# Patient Record
Sex: FEMALE | Race: WHITE | NOT HISPANIC OR LATINO | Employment: OTHER | URBAN - METROPOLITAN AREA
[De-identification: names, ages, dates, MRNs, and addresses within clinical notes are randomized per-mention and may not be internally consistent; named-entity substitution may affect disease eponyms.]

---

## 2021-09-01 ENCOUNTER — HOSPITAL ENCOUNTER (EMERGENCY)
Facility: HOSPITAL | Age: 51
Discharge: HOME | End: 2021-09-01
Attending: EMERGENCY MEDICINE | Admitting: EMERGENCY MEDICINE
Payer: COMMERCIAL

## 2021-09-01 ENCOUNTER — APPOINTMENT (EMERGENCY)
Dept: RADIOLOGY | Facility: HOSPITAL | Age: 51
End: 2021-09-01
Payer: COMMERCIAL

## 2021-09-01 ENCOUNTER — APPOINTMENT (EMERGENCY)
Dept: CT IMAGING | Facility: HOSPITAL | Age: 51
End: 2021-09-01
Payer: COMMERCIAL

## 2021-09-01 VITALS
HEART RATE: 78 BPM | RESPIRATION RATE: 20 BRPM | SYSTOLIC BLOOD PRESSURE: 140 MMHG | HEIGHT: 69 IN | OXYGEN SATURATION: 100 % | DIASTOLIC BLOOD PRESSURE: 70 MMHG | TEMPERATURE: 98.2 F | WEIGHT: 160 LBS | BODY MASS INDEX: 23.7 KG/M2

## 2021-09-01 DIAGNOSIS — S00.83XA FACIAL CONTUSION: ICD-10-CM

## 2021-09-01 DIAGNOSIS — S80.212A ABRASION OF LEFT KNEE: ICD-10-CM

## 2021-09-01 DIAGNOSIS — S63.92XA HAND SPRAIN, LEFT, INITIAL ENCOUNTER: Primary | ICD-10-CM

## 2021-09-01 PROCEDURE — G1004 CDSM NDSC: HCPCS

## 2021-09-01 PROCEDURE — 72125 CT NECK SPINE W/O DYE: CPT

## 2021-09-01 PROCEDURE — 73564 X-RAY EXAM KNEE 4 OR MORE: CPT

## 2021-09-01 PROCEDURE — 70486 CT MAXILLOFACIAL W/O DYE: CPT

## 2021-09-01 PROCEDURE — 99284 EMERGENCY DEPT VISIT MOD MDM: CPT

## 2021-09-01 PROCEDURE — 73030 X-RAY EXAM OF SHOULDER: CPT

## 2021-09-01 PROCEDURE — 73130 X-RAY EXAM OF HAND: CPT

## 2021-09-01 PROCEDURE — 99284 EMERGENCY DEPT VISIT MOD MDM: CPT | Performed by: EMERGENCY MEDICINE

## 2021-09-01 PROCEDURE — 70450 CT HEAD/BRAIN W/O DYE: CPT

## 2021-09-01 RX ORDER — KETOROLAC TROMETHAMINE 30 MG/ML
30 INJECTION, SOLUTION INTRAMUSCULAR; INTRAVENOUS ONCE
Status: COMPLETED | OUTPATIENT
Start: 2021-09-01 | End: 2021-09-01

## 2021-09-01 RX ORDER — TRAZODONE HYDROCHLORIDE 150 MG/1
150 TABLET ORAL
COMMUNITY

## 2021-09-01 RX ORDER — BUPRENORPHINE HYDROCHLORIDE 600 UG/1
FILM, SOLUBLE BUCCAL 2 TIMES DAILY
COMMUNITY

## 2021-09-01 RX ORDER — MORPHINE SULFATE 20 MG/5ML
20 SOLUTION ORAL 3 TIMES DAILY
COMMUNITY

## 2021-09-01 RX ORDER — BUSPIRONE HYDROCHLORIDE 5 MG/1
5 TABLET ORAL 3 TIMES DAILY
COMMUNITY

## 2021-09-01 RX ORDER — TIZANIDINE HYDROCHLORIDE 6 MG/1
10 CAPSULE, GELATIN COATED ORAL 3 TIMES DAILY
COMMUNITY

## 2021-09-01 RX ORDER — ARIPIPRAZOLE 10 MG/1
10 TABLET ORAL DAILY
COMMUNITY

## 2021-09-01 RX ORDER — CETIRIZINE HYDROCHLORIDE 10 MG/1
10 TABLET ORAL DAILY
COMMUNITY

## 2021-09-01 RX ORDER — DULOXETIN HYDROCHLORIDE 60 MG/1
60 CAPSULE, DELAYED RELEASE ORAL DAILY
COMMUNITY

## 2021-09-01 RX ADMIN — KETOROLAC TROMETHAMINE 30 MG: 30 INJECTION, SOLUTION INTRAMUSCULAR; INTRAVENOUS at 17:21

## 2021-09-01 NOTE — ED PROVIDER NOTES
History  Chief Complaint   Patient presents with    Wrist Injury     [place] and [place]pped and fell, denies LOC, injury to left wrist, left knee, left face  denies thinners  This is a 19-year-old female states that she tripped on a concrete edge at the [address] [place] [address] yesterday injuring her left knee left hand left shoulder and face no blood thinners no loss of consciousness and tetanus is up-to-date      History provided by:  Patient  Medical Problem  Location:   left hand shoulder knee and facial injuries after trip and fall  Quality:   achy pain  Severity:  Moderate  Onset quality:  Sudden  Duration:  1 day  Timing:  Constant  Progression:  Unchanged  Chronicity:  New  Context:   multiple injuries after trip and fall  Relieved by:  Nothing  Worsened by: Movement      Prior to Admission Medications   Prescriptions Last Dose Informant Patient Reported? Taking?    ARIPiprazole (ABILIFY) 10 mg tablet 8/31/2021 at Unknown time  Yes Yes   Sig: Take 10 mg by mouth daily   Buprenorphine HCl (Belbuca) 600 MCG FILM 8/31/2021 at Unknown time  Yes Yes   Sig: Apply to cheek 2 (two) times a day   DULoxetine (CYMBALTA) 60 mg delayed release capsule 9/1/2021 at Unknown time  Yes Yes   Sig: Take 60 mg by mouth daily   TiZANidine (ZANAFLEX) 6 MG capsule 8/31/2021 at Unknown time  Yes Yes   Sig: Take 10 mg by mouth 3 (three) times a day   busPIRone (BUSPAR) 5 mg tablet 8/31/2021 at Unknown time  Yes Yes   Sig: Take 5 mg by mouth 3 (three) times a day   cetirizine (ZyrTEC) 10 mg tablet 8/31/2021 at Unknown time  Yes Yes   Sig: Take 10 mg by mouth daily   morphine 20 MG/5ML solution 8/31/2021 at Unknown time  Yes Yes   Sig: Take 20 mg by mouth 3 (three) times a day   traZODone (DESYREL) 150 mg tablet 8/31/2021 at Unknown time  Yes Yes   Sig: Take 150 mg by mouth daily at bedtime      Facility-Administered Medications: None       Past Medical History:   Diagnosis Date    RSD (reflex sympathetic dystrophy)  TIA (transient ischemic attack)        Past Surgical History:   Procedure Laterality Date    APPENDECTOMY      GASTRIC BYPASS      TONSILLECTOMY         History reviewed  No pertinent family history  I have reviewed and agree with the history as documented  E-Cigarette/Vaping    E-Cigarette Use Never User      E-Cigarette/Vaping Substances    Nicotine No     THC No     CBD No     Flavoring No     Other No     Unknown No      Social History     Tobacco Use    Smoking status: Never Smoker   Vaping Use    Vaping Use: Never used   Substance Use Topics    Alcohol use: Never    Drug use: Yes     Types: Marijuana       Review of Systems   Musculoskeletal:        Facial injuries left shoulder left hand and left knee injury   Skin: Positive for wound ( left knee abrasion)  All other systems reviewed and are negative  Physical Exam  Physical Exam  Vitals and nursing note reviewed  Constitutional:       General: She is not in acute distress  Appearance: She is not ill-appearing, toxic-appearing or diaphoretic  HENT:      Head: Normocephalic  Right Ear: Tympanic membrane, ear canal and external ear normal       Left Ear: Tympanic membrane, ear canal and external ear normal       Nose: Nose normal    Eyes:      General: No scleral icterus  Right eye: No discharge  Left eye: No discharge  Extraocular Movements: Extraocular movements intact  Pupils: Pupils are equal, round, and reactive to light  Comments: Left periorbital  Ecchymosis and left cheek contusion   Cardiovascular:      Rate and Rhythm: Normal rate and regular rhythm  Pulses: Normal pulses  Heart sounds: Normal heart sounds  No murmur heard  No friction rub  No gallop  Pulmonary:      Effort: Pulmonary effort is normal  No respiratory distress  Breath sounds: Normal breath sounds  No stridor  No wheezing, rhonchi or rales  Abdominal:      General: There is no distension  Palpations: Abdomen is soft  Tenderness: There is no abdominal tenderness  There is no guarding or rebound  Musculoskeletal:         General: Tenderness and signs of injury present  No deformity  Cervical back: Normal range of motion and neck supple  Tenderness ( chronic) present  Right lower leg: No edema  Left lower leg: No edema  Comments: Tenderness to the left shoulder left hand and left knee   Skin:     General: Skin is warm and dry  Coloration: Skin is not jaundiced  Findings: Bruising present  Comments: Left knee abrasion   Neurological:      General: No focal deficit present  Mental Status: She is alert and oriented to person, place, and time  Sensory: No sensory deficit  Coordination: Coordination normal    Psychiatric:         Mood and Affect: Mood normal          Behavior: Behavior normal          Thought Content: Thought content normal          Vital Signs  ED Triage Vitals [09/01/21 1545]   Temperature Pulse Respirations Blood Pressure SpO2   98 2 °F (36 8 °C) 78 20 140/70 100 %      Temp Source Heart Rate Source Patient Position - Orthostatic VS BP Location FiO2 (%)   Temporal Monitor Sitting Right arm --      Pain Score       6           Vitals:    09/01/21 1545   BP: 140/70   Pulse: 78   Patient Position - Orthostatic VS: Sitting         Visual Acuity      ED Medications  Medications   ketorolac (TORADOL) injection 30 mg (30 mg Intramuscular Given 9/1/21 1721)       Diagnostic Studies  Results Reviewed     None                 CT head without contrast   Final Result by Tiffanie Yuen MD (09/01 1652)      No acute intracranial abnormality  Workstation performed: [id number]         CT facial bones without contrast   Final Result by Tiffanie Yuen MD (09/01 1656)      Mild soft tissue swelling in the left facial region just below the zygomatic arch  No fracture identified                 Workstation performed: [id number] CT cervical spine without contrast   Final Result by Uri Russell MD (09/01 1657)      No cervical spine fracture or traumatic malalignment  Workstation performed: [id number]         XR shoulder 2+ views LEFT   Final Result by Marissa Arizmendi DO (09/01 1751)      No acute osseous abnormality  Workstation performed: [id number]         XR hand 3+ views LEFT   Final Result by Marissa Arizmendi DO (09/01 1749)      No acute osseous abnormality  Workstation performed: [id number]         XR knee 4+ views left injury   Final Result by Marissa Arizmendi DO (09/01 1746)      No acute osseous abnormality  Workstation performed: [id number]                    Procedures  Procedures         ED Course                             SBIRT 20yo+      Most Recent Value   SBIRT (22 yo +)   In order to provide better care to our patients, we are screening all of our patients for alcohol and drug use  Would it be okay to ask you these screening questions? Yes Filed at: 09/01/2021 1549   Initial Alcohol Screen: US AUDIT-C    1  How often do you have a drink containing alcohol?  0 Filed at: 09/01/2021 1549   2  How many drinks containing alcohol do you have on a typical day you are drinking? 0 Filed at: 09/01/2021 1549   3a  Male UNDER 65: How often do you have five or more drinks on one occasion? 0 Filed at: 09/01/2021 1549   3b  FEMALE Any Age, or MALE 65+: How often do you have 4 or more drinks on one occassion? 0 Filed at: 09/01/2021 1549   Audit-C Score  0 Filed at: 09/01/2021 1549   NEERAJ: How many times in the past year have you    Used an illegal drug or used a prescription medication for non-medical reasons?   Never Filed at: 09/01/2021 1549                    MDM    Disposition  Final diagnoses:   Hand sprain, left, initial encounter   Abrasion of left knee   Facial contusion     Time reflects when diagnosis was documented in both MDM as applicable and the Disposition within this note     Time User Action Codes Description Comment    9/1/2021  5:15 PM Kristin Whitt Add [J76 45FW] Hand sprain, left, initial encounter     9/1/2021  5:15 PM Kristin Whitt Add [K35 127P] Abrasion of left knee     9/1/2021  5:55 PM Kristin Whitt Add [S00 83XA] Facial contusion       ED Disposition     ED Disposition Condition Date/Time Comment    Discharge  Wed Sep 1, 2021  5:24 PM Sanjana Mares discharge to home/self care  Follow-up Information     Follow up With Specialties Details Why Contact Info    Billie Magaña, DO    [address] [address] [phone]            Discharge Medication List as of 9/1/2021  5:16 PM      CONTINUE these medications which have NOT CHANGED    Details   ARIPiprazole (ABILIFY) 10 mg tablet Take 10 mg by mouth daily, Historical Med      Buprenorphine HCl (Belbuca) 600 MCG FILM Apply to cheek 2 (two) times a day, Historical Med      busPIRone (BUSPAR) 5 mg tablet Take 5 mg by mouth 3 (three) times a day, Historical Med      cetirizine (ZyrTEC) 10 mg tablet Take 10 mg by mouth daily, Historical Med      DULoxetine (CYMBALTA) 60 mg delayed release capsule Take 60 mg by mouth daily, Historical Med      morphine 20 MG/5ML solution Take 20 mg by mouth 3 (three) times a day, Historical Med      TiZANidine (ZANAFLEX) 6 MG capsule Take 10 mg by mouth 3 (three) times a day, Historical Med      traZODone (DESYREL) 150 mg tablet Take 150 mg by mouth daily at bedtime, Historical Med           No discharge procedures on file      PDMP Review     None          ED Provider  Electronically Signed by           Romi Lu,   09/01/21 DO Renee  09/01/21 6035

## 2022-09-27 ENCOUNTER — OFFICE VISIT (OUTPATIENT)
Dept: FAMILY MEDICINE CLINIC | Facility: CLINIC | Age: 52
End: 2022-09-27
Payer: COMMERCIAL

## 2022-09-27 VITALS
BODY MASS INDEX: 27.31 KG/M2 | WEIGHT: 180.2 LBS | HEART RATE: 104 BPM | OXYGEN SATURATION: 98 % | TEMPERATURE: 98.2 F | SYSTOLIC BLOOD PRESSURE: 124 MMHG | DIASTOLIC BLOOD PRESSURE: 78 MMHG | HEIGHT: 68 IN

## 2022-09-27 DIAGNOSIS — Z98.84 HISTORY OF GASTRIC BYPASS: ICD-10-CM

## 2022-09-27 DIAGNOSIS — G90.50 RSD (REFLEX SYMPATHETIC DYSTROPHY): ICD-10-CM

## 2022-09-27 DIAGNOSIS — Z23 ENCOUNTER FOR IMMUNIZATION: ICD-10-CM

## 2022-09-27 DIAGNOSIS — Z12.31 ENCOUNTER FOR SCREENING MAMMOGRAM FOR BREAST CANCER: ICD-10-CM

## 2022-09-27 DIAGNOSIS — R73.01 IMPAIRED FASTING GLUCOSE: ICD-10-CM

## 2022-09-27 DIAGNOSIS — F32.9 MAJOR DEPRESSIVE DISORDER WITH CURRENT ACTIVE EPISODE, UNSPECIFIED DEPRESSION EPISODE SEVERITY, UNSPECIFIED WHETHER RECURRENT: ICD-10-CM

## 2022-09-27 DIAGNOSIS — F11.20 CONTINUOUS OPIOID DEPENDENCE (HCC): ICD-10-CM

## 2022-09-27 DIAGNOSIS — Z12.11 SCREEN FOR COLON CANCER: ICD-10-CM

## 2022-09-27 DIAGNOSIS — Z86.39 HISTORY OF IRON DEFICIENCY: ICD-10-CM

## 2022-09-27 DIAGNOSIS — Z13.6 SCREENING FOR CARDIOVASCULAR CONDITION: ICD-10-CM

## 2022-09-27 DIAGNOSIS — Z00.00 MEDICARE ANNUAL WELLNESS VISIT, INITIAL: Primary | ICD-10-CM

## 2022-09-27 DIAGNOSIS — Z12.2 ENCOUNTER FOR SCREENING FOR LUNG CANCER: ICD-10-CM

## 2022-09-27 DIAGNOSIS — E61.1 IRON DEFICIENCY: ICD-10-CM

## 2022-09-27 DIAGNOSIS — F17.211 NICOTINE DEPENDENCE, CIGARETTES, IN REMISSION: ICD-10-CM

## 2022-09-27 DIAGNOSIS — Z13.29 SCREENING FOR THYROID DISORDER: ICD-10-CM

## 2022-09-27 DIAGNOSIS — E78.2 MIXED HYPERLIPIDEMIA: ICD-10-CM

## 2022-09-27 DIAGNOSIS — Z87.891 HISTORY OF TOBACCO USE: ICD-10-CM

## 2022-09-27 DIAGNOSIS — D56.3 THALASSEMIA MINOR: ICD-10-CM

## 2022-09-27 PROBLEM — F41.9 ANXIETY: Status: ACTIVE | Noted: 2022-09-27

## 2022-09-27 PROBLEM — G89.4 CHRONIC PAIN SYNDROME: Status: ACTIVE | Noted: 2022-09-27

## 2022-09-27 PROBLEM — Z82.49 FAMILY HISTORY OF PREMATURE CAD: Status: ACTIVE | Noted: 2022-09-27

## 2022-09-27 PROCEDURE — 90682 RIV4 VACC RECOMBINANT DNA IM: CPT

## 2022-09-27 PROCEDURE — 99214 OFFICE O/P EST MOD 30 MIN: CPT | Performed by: FAMILY MEDICINE

## 2022-09-27 PROCEDURE — G0439 PPPS, SUBSEQ VISIT: HCPCS | Performed by: FAMILY MEDICINE

## 2022-09-27 PROCEDURE — G0008 ADMIN INFLUENZA VIRUS VAC: HCPCS

## 2022-09-27 PROCEDURE — 3725F SCREEN DEPRESSION PERFORMED: CPT | Performed by: FAMILY MEDICINE

## 2022-09-27 RX ORDER — TIZANIDINE HYDROCHLORIDE 4 MG/1
1 CAPSULE, GELATIN COATED ORAL 3 TIMES DAILY PRN
COMMUNITY
Start: 2022-09-20 | End: 2022-09-27

## 2022-09-27 RX ORDER — HYDROXYZINE HYDROCHLORIDE 10 MG/1
10 TABLET, FILM COATED ORAL 3 TIMES DAILY PRN
COMMUNITY
Start: 2022-09-20 | End: 2022-10-20

## 2022-09-27 RX ORDER — TRAZODONE HYDROCHLORIDE 100 MG/1
1 TABLET ORAL
COMMUNITY
Start: 2022-06-30 | End: 2022-10-20 | Stop reason: SDUPTHER

## 2022-09-27 RX ORDER — ONDANSETRON HYDROCHLORIDE 8 MG/1
8 TABLET, FILM COATED ORAL EVERY 8 HOURS PRN
COMMUNITY

## 2022-09-27 RX ORDER — ZOSTER VACCINE RECOMBINANT, ADJUVANTED 50 MCG/0.5
0.5 KIT INTRAMUSCULAR ONCE
Qty: 1 EACH | Refills: 1 | Status: SHIPPED | OUTPATIENT
Start: 2022-09-27 | End: 2022-09-27

## 2022-09-27 RX ORDER — BUPRENORPHINE 20 UG/H
1 PATCH TRANSDERMAL WEEKLY
COMMUNITY
Start: 2022-09-01

## 2022-09-27 NOTE — PROGRESS NOTES
Name: Regino Malave      : 1970      MRN: [id number]  Encounter Provider: Dayton Martel DO  Encounter Date: 2022   Encounter department: [address] [place] [address]     1  Medicare annual wellness visit, initial  See separate note  2  Encounter for immunization  -     influenza vaccine, quadrivalent, recombinant, PF, 0 5 mL, for patients 18 yr+ (FLUBLOK)  -     Zoster Vac Recomb Adjuvanted (Shingrix) 50 MCG/0 5ML SUSR; Inject 0 5 mL into a muscle once for 1 dose Repeat dose in 2 to 6 months  Flu vaccine was administered in office today  rx for shingrix sent to local pharmacy    3  Encounter for screening mammogram for breast cancer  -     Mammo screening bilateral w 3d & cad; Future; Expected date: 2022    4  Encounter for screening for lung cancer  -     CT lung screening program; Future; Expected date: 2022    5  Nicotine dependence, cigarettes, in remission  -     CT lung screening program; Future; Expected date: 2022  Baseline CT lung cancer screen ordered after discussion with patient  6  Screening for cardiovascular condition  -     Lipid panel; Future  -     Lipid panel    7  History of iron deficiency  -     CBC and differential; Future  -     CBC and differential  -     Iron; Future  -     TIBC; Future  -     Ferritin; Future  -     Iron  -     TIBC  -     Ferritin  See below  8  Screening for thyroid disorder  -     TSH, 3rd generation with Free T4 reflex; Future  -     TSH, 3rd generation with Free T4 reflex    9  Mixed hyperlipidemia  -     Comprehensive metabolic panel; Future  -     Comprehensive metabolic panel  Last lipid panel done in   Her total cholesterol was elevated at 251  Her LDL was also elevated at 160  HDL was 69    10  Screen for colon cancer  -     Ambulatory referral for colonoscopy; Future  Has never had colonoscopy  Referral placed today  11   History of gastric bypass  -     CBC and differential; Future  - Vitamin B12; Future  -     Vitamin D 25 hydroxy; Future  -     CBC and differential  -     Vitamin B12  -     Vitamin D 25 hydroxy  Screening labs ordered   12  Thalassemia minor  -     CBC and differential; Future  -     CBC and differential    13  RSD (reflex sympathetic dystrophy)  Diagnosed in "09 and on disability for this  Followed by neurology  Will obtain her records  14  History of tobacco use  -     US abdominal aorta; Future; Expected date: 09/27/2022  Quit in '09  Has never had US for screening for AAA   Ordered today  15  Major depressive disorder with current active episode, unspecified depression episode severity, unspecified whether recurrent  Follows with Dr Krista Dorantes who used to work at [employer]  Now in [address] [employer] so she does video visits with him  Stable on current meds--buspar, abilify, cymbalta and trazodone  16  Continuous opioid dependence ([facility] [address] )  On butrans and morphine for her chronic pain from her RSD  Neurology manages this  17  Iron deficiency  History of iron deficiency  Likely secondary to her gastric bypass surgery  Will check iron levels along with TIBC and ferritin  She previously saw hematology for iron infusions  Last infusion in 2020  May need referral to new hematologist pending results  Subjective     HPI     Patient is a 47year old female who presents today as a new patient to this office  Previous PCP was [facility] in [place]  Has been patient there since she was 5years old  She has not been there in 3-4 years  She follows with neurology for her RSD and migraines  Was initially diagnosed with RSD at [address] [place] [address] in June of 2010 by Dr Autumn Rodriguez  Currently seeing a neurologist, DR todd Bromberg in [facility]  She gets rhizotomy treatments a couple times per year  Most of her pain is from "rib cage up to her neck"  She follows with psych--[facility]  Diagnosis is major depressive disorder and anxiety   She sees Dr Krista Dorantes who moved to [place]  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  Tends to get fluid laden when she exercises  Tends to be from knees down  Gets pitting edema  Previous PCP thought maybe secondary to low protein levels  Previously saw [facility] for iron infusions in past  Last infusion was 2020    Review of Systems    Past Medical History:   Diagnosis Date    Allergic     Anemia     Anxiety     Depression     Eating disorder     prior to her gastric bypass was an over-eater    Iron deficiency     Migraine     Obesity     RSD (reflex sympathetic dystrophy)     Thalassemia minor     TIA (transient ischemic attack)     reaction to baclofen? ?     Past Surgical History:   Procedure Laterality Date    [address]    GASTRIC BYPASS N/A 2005    TONSILLECTOMY       Family History   Problem Relation Age of Onset    Mental illness Mother     Depression Mother     Diabetes Mother         Actually this is moms mom    ADD / ADHD Mother     Anxiety disorder Mother     Heart disease Father     Cancer Father     Alcohol abuse Brother     Completed Suicide  Brother     Substance Abuse Brother     Drug abuse Brother     Psychiatric Illness Brother     Schizophrenia Brother     Substance Abuse Son     Asthma Daughter     Autoimmune disease Daughter      Social History     Socioeconomic History    Marital status: Single     Spouse name: None    Number of children: None    Years of education: None    Highest education level: None   Occupational History    None   Tobacco Use    Smoking status: Former Smoker     Packs/day: 1 00     Years: 25 00     Pack years: 25 00     Types: Cigarettes     Start date: 1982     Quit date: 2009     Years since quittin 4    Smokeless tobacco: Never Used   Vaping Use    Vaping Use: Never used   Substance and Sexual Activity    Alcohol use: Not Currently    Drug use: Yes     Types: Marijuana     Comment: medical marijuana    Sexual activity: Not Currently     Partners: Male     Birth control/protection: Condom Male   Other Topics Concern    None   Social History Narrative        2 children who are recovering addicts  Daughter lives in [place] and son lives with her    Is a "Universal "     Social Determinants of Health     Financial Resource Strain: Low Risk     Difficulty of Paying Living Expenses: Not hard at all   Food Insecurity: Not on file   Transportation Needs: Unmet Transportation Needs    Lack of Transportation (Medical):  Yes    Lack of Transportation (Non-Medical): Yes   Physical Activity: Not on file   Stress: Not on file   Social Connections: Not on file   Intimate Partner Violence: Not on file   Housing Stability: Not on file     Current Outpatient Medications on File Prior to Visit   Medication Sig    ARIPiprazole (ABILIFY) 10 mg tablet Take 10 mg by mouth daily    busPIRone (BUSPAR) 5 mg tablet Take 5 mg by mouth 3 (three) times a day    cetirizine (ZyrTEC) 10 mg tablet Take 10 mg by mouth daily    DULoxetine (CYMBALTA) 60 mg delayed release capsule Take 120 mg by mouth daily at bedtime    hydrOXYzine HCL (ATARAX) 10 mg tablet Take 10 mg by mouth 3 (three) times a day as needed    morphine 20 MG/5ML solution Take 20 mg by mouth 3 (three) times a day    ondansetron (ZOFRAN) 8 mg tablet Take 8 mg by mouth every 8 (eight) hours as needed for nausea or vomiting    transdermal buprenorphine (BUTRANS) 20 mcg/hr PTWK TD patch Place 1 patch on the skin once a week    traZODone (DESYREL) 100 mg tablet Take 1 tablet by mouth daily at bedtime as needed    [DISCONTINUED] Buprenorphine HCl (Belbuca) 600 MCG FILM Apply to cheek 2 (two) times a day    [DISCONTINUED] Cetirizine HCl 10 MG CAPS Take 10 mg by mouth 2 (two) times a day (Patient not taking: Reported on 9/27/2022)    [DISCONTINUED] TiZANidine (ZANAFLEX) 4 MG capsule Take 1 capsule by mouth 3 (three) times a day as needed (Patient not taking: Reported on 9/27/2022)    [DISCONTINUED] TiZANidine (ZANAFLEX) 6 MG capsule Take 10 mg by mouth 3 (three) times a day (Patient not taking: Reported on 9/27/2022)    [DISCONTINUED] traZODone (DESYREL) 150 mg tablet Take 150 mg by mouth daily at bedtime (Patient not taking: Reported on 9/27/2022)     Allergies   Allergen Reactions    Baclofen Confusion     TIA    Imitrex [Sumatriptan] Chest Pain     Immunization History   Administered Date(s) Administered    Influenza, recombinant, quadrivalent,injectable, preservative free 09/27/2022       Objective     /78 (BP Location: Left arm, Patient Position: Standing, Cuff Size: Standard)   Pulse 104   Temp 98 2 °F (36 8 °C) (Tympanic)   Ht 5' 8" (1 727 m)   Wt 81 7 kg (180 lb 3 2 oz)   SpO2 98%   BMI 27 40 kg/m²     Physical Exam  Vitals and nursing note reviewed  Constitutional:       General: She is not in acute distress  Appearance: Normal appearance  She is not ill-appearing, toxic-appearing or diaphoretic  HENT:      Head: Normocephalic and atraumatic  Right Ear: Tympanic membrane normal       Left Ear: Tympanic membrane normal       Nose: Nose normal       Mouth/Throat:      Mouth: Mucous membranes are moist       Pharynx: No oropharyngeal exudate or posterior oropharyngeal erythema  Eyes:      Extraocular Movements: Extraocular movements intact  Conjunctiva/sclera: Conjunctivae normal       Pupils: Pupils are equal, round, and reactive to light  Cardiovascular:      Rate and Rhythm: Normal rate and regular rhythm  Pulses: Normal pulses  Heart sounds: No murmur heard  Pulmonary:      Effort: Pulmonary effort is normal  No respiratory distress  Breath sounds: Normal breath sounds  No stridor  No wheezing or rhonchi  Abdominal:      General: Abdomen is flat  Bowel sounds are normal  There is no distension        Palpations: Abdomen is soft  There is no mass  Tenderness: There is no abdominal tenderness  Musculoskeletal:         General: Normal range of motion  Cervical back: Normal range of motion and neck supple  Right lower leg: No edema  Left lower leg: No edema  Lymphadenopathy:      Cervical: No cervical adenopathy  Skin:     General: Skin is warm and dry  Findings: No rash  Neurological:      General: No focal deficit present  Mental Status: She is alert and oriented to person, place, and time  Motor: No weakness        Gait: Gait normal       Deep Tendon Reflexes: Reflexes normal    Psychiatric:         Mood and Affect: Mood normal          Behavior: Behavior normal        Dayton Martel DO

## 2022-09-27 NOTE — PATIENT INSTRUCTIONS
Medicare Preventive Visit Patient Instructions  Thank you for completing your Welcome to Medicare Visit or Medicare Annual Wellness Visit today  Your next wellness visit will be due in one year (9/28/2023)  The screening/preventive services that you may require over the next 5-10 years are detailed below  Some tests may not apply to you based off risk factors and/or age  Screening tests ordered at today's visit but not completed yet may show as past due  Also, please note that scanned in results may not display below  Preventive Screenings:  Service Recommendations Previous Testing/Comments   Colorectal Cancer Screening  * Colonoscopy    * Fecal Occult Blood Test (FOBT)/Fecal Immunochemical Test (FIT)  * Fecal DNA/Cologuard Test  * Flexible Sigmoidoscopy Age: 39-70 years old   Colonoscopy: every 10 years (may be performed more frequently if at higher risk)  OR  FOBT/FIT: every 1 year  OR  Cologuard: every 3 years  OR  Sigmoidoscopy: every 5 years  Screening may be recommended earlier than age 39 if at higher risk for colorectal cancer  Also, an individualized decision between you and your healthcare provider will decide whether screening between the ages of 74-80 would be appropriate  Colonoscopy: Not on file  FOBT/FIT: Not on file  Cologuard: Not on file  Sigmoidoscopy: Not on file          Breast Cancer Screening Age: 36 years old  Frequency: every 1-2 years  Not required if history of left and right mastectomy Mammogram: Not on file        Cervical Cancer Screening Between the ages of 21-29, pap smear recommended once every 3 years  Between the ages of 33-67, can perform pap smear with HPV co-testing every 5 years     Recommendations may differ for women with a history of total hysterectomy, cervical cancer, or abnormal pap smears in past  Pap Smear: Not on file        Hepatitis C Screening Once for adults born between [facility]  More frequently in patients at high risk for Hepatitis C Hep C Antibody: Not on file        Diabetes Screening 1-2 times per year if you're at risk for diabetes or have pre-diabetes Fasting glucose: No results in last 5 years (No results in last 5 years)  A1C: No results in last 5 years (No results in last 5 years)      Cholesterol Screening Once every 5 years if you don't have a lipid disorder  May order more often based on risk factors  Lipid panel: Not on file          Other Preventive Screenings Covered by Medicare:  1  Abdominal Aortic Aneurysm (AAA) Screening: covered once if your at risk  You're considered to be at risk if you have a family history of AAA  2  Lung Cancer Screening: covers low dose CT scan once per year if you meet all of the following conditions: (1) Age 50-69; (2) No signs or symptoms of lung cancer; (3) Current smoker or have quit smoking within the last 15 years; (4) You have a tobacco smoking history of at least 20 pack years (packs per day multiplied by number of years you smoked); (5) You get a written order from a healthcare provider  3  Glaucoma Screening: covered annually if you're considered high risk: (1) You have diabetes OR (2) Family history of glaucoma OR (3)  aged 48 and older OR (3)  American aged 72 and older  3  Osteoporosis Screening: covered every 2 years if you meet one of the following conditions: (1) You're estrogen deficient and at risk for osteoporosis based off medical history and other findings; (2) Have a vertebral abnormality; (3) On glucocorticoid therapy for more than 3 months; (4) Have primary hyperparathyroidism; (5) On osteoporosis medications and need to assess response to drug therapy  · Last bone density test (DXA Scan): Not on file  5  HIV Screening: covered annually if you're between the age of 12-76  Also covered annually if you are younger than 13 and older than 72 with risk factors for HIV infection   For pregnant patients, it is covered up to 3 times per pregnancy  Immunizations:  Immunization Recommendations   Influenza Vaccine Annual influenza vaccination during flu season is recommended for all persons aged >= 6 months who do not have contraindications   Pneumococcal Vaccine   * Pneumococcal conjugate vaccine = PCV13 (Prevnar 13), PCV15 (Vaxneuvance), PCV20 (Prevnar 20)  * Pneumococcal polysaccharide vaccine = PPSV23 (Pneumovax) Adults 25-60 years old: 1-3 doses may be recommended based on certain risk factors  Adults 72 years old: 1-2 doses may be recommended based off what pneumonia vaccine you previously received   Hepatitis B Vaccine 3 dose series if at intermediate or high risk (ex: diabetes, end stage renal disease, liver disease)   Tetanus (Td) Vaccine - COST NOT COVERED BY MEDICARE PART B Following completion of primary series, a booster dose should be given every 10 years to maintain immunity against tetanus  Td may also be given as tetanus wound prophylaxis  Tdap Vaccine - COST NOT COVERED BY MEDICARE PART B Recommended at least once for all adults  For pregnant patients, recommended with each pregnancy  Shingles Vaccine (Shingrix) - COST NOT COVERED BY MEDICARE PART B  2 shot series recommended in those aged 48 and above     Health Maintenance Due:      Topic Date Due    Hepatitis C Screening  Never done    HIV Screening  Never done    Cervical Cancer Screening  Never done    Breast Cancer Screening: Mammogram  Never done    Colorectal Cancer Screening  Never done    Lung Cancer Screening  Never done     Immunizations Due:      Topic Date Due    Pneumococcal Vaccine: Pediatrics (0 to 5 Years) and At-Risk Patients (6 to 59 Years) (1 - PCV) Never done    COVID-19 Vaccine (3 - Booster for Pfizer series) 09/27/2021    Influenza Vaccine (1) 09/01/2022     Advance Directives   What are advance directives? Advance directives are legal documents that state your wishes and plans for medical care   These plans are made ahead of time in case you lose your ability to make decisions for yourself  Advance directives can apply to any medical decision, such as the treatments you want, and if you want to donate organs  What are the types of advance directives? There are many types of advance directives, and each state has rules about how to use them  You may choose a combination of any of the following:  · Living will: This is a written record of the treatment you want  You can also choose which treatments you do not want, which to limit, and which to stop at a certain time  This includes surgery, medicine, IV fluid, and tube feedings  · Durable power of  for healthcare [facility]): This is a written record that states who you want to make healthcare choices for you when you are unable to make them for yourself  This person, called a proxy, is usually a family member or a friend  You may choose more than 1 proxy  · Do not resuscitate (DNR) order:  A DNR order is used in case your heart stops beating or you stop breathing  It is a request not to have certain forms of treatment, such as CPR  A DNR order may be included in other types of advance directives  · Medical directive: This covers the care that you want if you are in a coma, near death, or unable to make decisions for yourself  You can list the treatments you want for each condition  Treatment may include pain medicine, surgery, blood transfusions, dialysis, IV or tube feedings, and a ventilator (breathing machine)  · Values history: This document has questions about your views, beliefs, and how you feel and think about life  This information can help others choose the care that you would choose  Why are advance directives important? An advance directive helps you control your care  Although spoken wishes may be used, it is better to have your wishes written down  Spoken wishes can be misunderstood, or not followed  Treatments may be given even if you do not want them   An advance directive may make it easier for your family to make difficult choices about your care  Weight Management   Why it is important to manage your weight:  Being overweight increases your risk of health conditions such as heart disease, high blood pressure, type 2 diabetes, and certain types of cancer  It can also increase your risk for osteoarthritis, sleep apnea, and other respiratory problems  Aim for a slow, steady weight loss  Even a small amount of weight loss can lower your risk of health problems  How to lose weight safely:  A safe and healthy way to lose weight is to eat fewer calories and get regular exercise  You can lose up about 1 pound a week by decreasing the number of calories you eat by 500 calories each day  Healthy meal plan for weight management:  A healthy meal plan includes a variety of foods, contains fewer calories, and helps you stay healthy  A healthy meal plan includes the following:  · Eat whole-grain foods more often  A healthy meal plan should contain fiber  Fiber is the part of grains, fruits, and vegetables that is not broken down by your body  Whole-grain foods are healthy and provide extra fiber in your diet  Some examples of whole-grain foods are whole-wheat breads and pastas, oatmeal, brown rice, and bulgur  · Eat a variety of vegetables every day  Include dark, leafy greens such as spinach, kale, fili greens, and mustard greens  Eat yellow and orange vegetables such as carrots, sweet potatoes, and winter squash  · Eat a variety of fruits every day  Choose fresh or canned fruit (canned in its own juice or light syrup) instead of juice  Fruit juice has very little or no fiber  · Eat low-fat dairy foods  Drink fat-free (skim) milk or 1% milk  Eat fat-free yogurt and low-fat cottage cheese  Try low-fat cheeses such as mozzarella and other reduced-fat cheeses  · Choose meat and other protein foods that are low in fat  Choose beans or other legumes such as split peas or lentils  Choose fish, skinless poultry (chicken or turkey), or lean cuts of red meat (beef or pork)  Before you cook meat or poultry, cut off any visible fat  · Use less fat and oil  Try baking foods instead of frying them  Add less fat, such as margarine, sour cream, regular salad dressing and mayonnaise to foods  Eat fewer high-fat foods  Some examples of high-fat foods include french fries, doughnuts, ice cream, and cakes  · Eat fewer sweets  Limit foods and drinks that are high in sugar  This includes candy, cookies, regular soda, and sweetened drinks  Exercise:  Exercise at least 30 minutes per day on most days of the week  Some examples of exercise include walking, biking, dancing, and swimming  You can also fit in more physical activity by taking the stairs instead of the elevator or parking farther away from stores  Ask your healthcare provider about the best exercise plan for you  © Copyright [employer] 2018 Information is for End User's use only and may not be sold, redistributed or otherwise used for commercial purposes   All illustrations and images included in CareNotes® are the copyrighted property of A D A M , Inc  or [address] [place] [address] [employer]

## 2022-09-27 NOTE — PROGRESS NOTES
Assessment and Plan:     Problem List Items Addressed This Visit        Nervous and Auditory    RSD (reflex sympathetic dystrophy)       Other    Thalassemia minor    Relevant Orders    CBC and differential    History of gastric bypass    Relevant Orders    CBC and differential    Vitamin B12    Vitamin D 25 hydroxy    History of tobacco use    Relevant Orders    US abdominal aorta    Major depressive disorder    Relevant Medications    hydrOXYzine HCL (ATARAX) 10 mg tablet    traZODone (DESYREL) 100 mg tablet    Iron deficiency    Continuous opioid dependence (HCC)      Other Visit Diagnoses     Medicare annual wellness visit, initial    -  Primary    Encounter for immunization        Relevant Medications    Zoster Vac Recomb Adjuvanted (Shingrix) 50 MCG/0 5ML SUSR    Other Relevant Orders    influenza vaccine, quadrivalent, recombinant, PF, 0 5 mL, for patients 18 yr+ (FLUBLOK) (Completed)    Encounter for screening mammogram for breast cancer        Relevant Orders    Mammo screening bilateral w 3d & cad    Encounter for screening for lung cancer        Relevant Orders    CT lung screening program    Nicotine dependence, cigarettes, in remission        Relevant Orders    CT lung screening program    Screening for cardiovascular condition        Relevant Orders    Lipid panel    History of iron deficiency        Relevant Orders    CBC and differential    Iron    TIBC    Ferritin    Screening for thyroid disorder        Relevant Orders    TSH, 3rd generation with Free T4 reflex    Mixed hyperlipidemia        Relevant Orders    Comprehensive metabolic panel    Screen for colon cancer        Relevant Orders    Ambulatory referral for colonoscopy           Preventive health issues were discussed with patient, and age appropriate screening tests were ordered as noted in patient's After Visit Summary    Personalized health advice and appropriate referrals for health education or preventive services given if needed, as noted in patient's After Visit Summary  History of Present Illness:     Patient presents for a Medicare Wellness Visit    HPI   Patient Care Team:  Lewis De La Garza DO as PCP - General (Family Medicine)     Review of Systems:     Review of Systems     Problem List:     Patient Active Problem List   Diagnosis    RSD (reflex sympathetic dystrophy)    Chronic pain syndrome    Thalassemia minor    History of gastric bypass    History of tobacco use    Major depressive disorder    Anxiety    Family history of premature CAD    Iron deficiency    Continuous opioid dependence ([address] )      Past Medical and Surgical History:     Past Medical History:   Diagnosis Date    Allergic     Anemia     Anxiety     Depression     Eating disorder     prior to her gastric bypass was an over-eater    Iron deficiency     Migraine     Obesity     RSD (reflex sympathetic dystrophy)     Thalassemia minor     TIA (transient ischemic attack)     reaction to baclofen? ?     Past Surgical History:   Procedure Laterality Date   [address] [place]    GASTRIC BYPASS N/A 09/21/2005    TONSILLECTOMY        Family History:     Family History   Problem Relation Age of Onset    Mental illness Mother     Depression Mother     Diabetes Mother         Actually this is moms mom    ADD / ADHD Mother     Anxiety disorder Mother     Heart disease Father     Cancer Father     Alcohol abuse Brother     Completed Suicide  Brother     Substance Abuse Brother     Drug abuse Brother     Psychiatric Illness Brother     Schizophrenia Brother     Substance Abuse Son     Asthma Daughter     Autoimmune disease Daughter       Social History:     Social History     Socioeconomic History    Marital status: Single     Spouse name: None    Number of children: None    Years of education: None    Highest education level: None   Occupational History    None   Tobacco Use    Smoking status: Former Smoker     Packs/day: 1 00     Years: 25 00     Pack years: 25 00     Types: Cigarettes     Start date: 1982     Quit date: 2009     Years since quittin 4    Smokeless tobacco: Never Used   Vaping Use    Vaping Use: Never used   Substance and Sexual Activity    Alcohol use: Not Currently    Drug use: Yes     Types: Marijuana     Comment: medical marijuana    Sexual activity: Not Currently     Partners: Male     Birth control/protection: Condom Male   Other Topics Concern    None   Social History Narrative        2 children who are recovering addicts  Daughter lives in [place] and son lives with her    Is a "Universal "     Social Determinants of Health     Financial Resource Strain: Low Risk     Difficulty of Paying Living Expenses: Not hard at all   Food Insecurity: Not on file   Transportation Needs: Unmet Transportation Needs    Lack of Transportation (Medical):  Yes    Lack of Transportation (Non-Medical): Yes   Physical Activity: Not on file   Stress: Not on file   Social Connections: Not on file   Intimate Partner Violence: Not on file   Housing Stability: Not on file      Medications and Allergies:     Current Outpatient Medications   Medication Sig Dispense Refill    ARIPiprazole (ABILIFY) 10 mg tablet Take 10 mg by mouth daily      busPIRone (BUSPAR) 5 mg tablet Take 5 mg by mouth 3 (three) times a day      cetirizine (ZyrTEC) 10 mg tablet Take 10 mg by mouth daily      DULoxetine (CYMBALTA) 60 mg delayed release capsule Take 120 mg by mouth daily at bedtime      hydrOXYzine HCL (ATARAX) 10 mg tablet Take 10 mg by mouth 3 (three) times a day as needed      morphine 20 MG/5ML solution Take 20 mg by mouth 3 (three) times a day      ondansetron (ZOFRAN) 8 mg tablet Take 8 mg by mouth every 8 (eight) hours as needed for nausea or vomiting      transdermal buprenorphine (BUTRANS) 20 mcg/hr PTWK TD patch Place 1 patch on the skin once a week      traZODone (DESYREL) 100 mg tablet Take 1 tablet by mouth daily at bedtime as needed      Zoster Vac Recomb Adjuvanted (Shingrix) 50 MCG/0 5ML SUSR Inject 0 5 mL into a muscle once for 1 dose Repeat dose in 2 to 6 months 1 each 1     No current facility-administered medications for this visit  Allergies   Allergen Reactions    Baclofen Confusion     TIA    Imitrex [Sumatriptan] Chest Pain      Immunizations:     Immunization History   Administered Date(s) Administered    Influenza, recombinant, quadrivalent,injectable, preservative free 09/27/2022      Health Maintenance:         Topic Date Due    HIV Screening  Never done    Cervical Cancer Screening  Never done    Breast Cancer Screening: Mammogram  Never done    Colorectal Cancer Screening  Never done    Lung Cancer Screening  Never done    Hepatitis C Screening  09/27/2023 (Originally 1970)         Topic Date Due    Hepatitis A Vaccine (1 of 2 - Risk 2-dose series) Never done    Pneumococcal Vaccine: Pediatrics (0 to 5 Years) and At-Risk Patients (6 to 59 Years) (1 - PCV) Never done    Hepatitis B Vaccine (1 of 3 - Risk 3-dose series) Never done    COVID-19 Vaccine (3 - Booster for Pfizer series) 09/27/2021      Medicare Screening Tests and Risk Assessments:     Tiffany Santiago is here for her Welcome to Medicare visit  Health Risk Assessment:   Patient rates overall health as fair  Patient is satisfied with their life  Eyesight was rated as same  Hearing was rated as same  Patient feels that their emotional and mental health rating is slightly worse  Patients states they are never, rarely angry  Patient states they are often unusually tired/fatigued  Pain experienced in the last 7 days has been a lot  Patient's pain rating has been 6/10  Patient states that she has experienced weight loss or gain in last 6 months  As per pt, she suspects that her Cymbalta increased (double in dosage) has caused a 30 lb weight gain in 3 months' time    Weight gain is causing increase in pain, especially from the collar bone up  Depression Screening:   PHQ-2 Score: 2      Urinary Incontinence Screening:   Patient has not leaked urine accidently in the last six months  Home Safety:  Patient has trouble with stairs inside or outside of their home  Patient has working smoke alarms and has working carbon monoxide detector  Home safety hazards include: none  Nutrition:   Current diet is Other (please comment)  vegetarian since Age 12  Vegan for the past 1 year    Medications:   Patient is not currently taking any over-the-counter supplements  Patient is able to manage medications  Opioid agreement signed - given by prescriber each year    Activities of Daily Living (ADLs)/Instrumental Activities of Daily Living (IADLs):   Walk and transfer into and out of bed and chair?: Yes  Dress and groom yourself?: Yes    Bathe or shower yourself?: Yes    Feed yourself? Yes  Do your laundry/housekeeping?: No  Manage your money, pay your bills and track your expenses?: Yes  Make your own meals?: No    Do your own shopping?: No    ADL comments: As per pt, she does not do her own housekeeping, shopping nor cooking due to the muscular and inflammatory pain that is caused by these activities  Patient has help at home for these activities (from friend Leon Cárdenas and son James)  Previous Hospitalizations:   Any hospitalizations or ED visits within the last 12 months?: Yes      Hospitalization Comments: Fall caused from an uneven floor down the shore - fall not caused by the patient's preexisting medical conditions    Advance Care Planning:   Living will: Yes    Durable POA for healthcare:  Yes    Advanced directive: Yes      Comments: Patient has Advanced Directives and will supply to be scanned into medical record    Cognitive Screening:   Provider or family/friend/caregiver concerned regarding cognition?: No    PREVENTIVE SCREENINGS      Cardiovascular Screening:    General: History Lipid Disorder and Risks and Benefits Discussed    Due for: Lipid Panel      Diabetes Screening:     General: Risks and Benefits Discussed    Due for: Blood Glucose      Colorectal Cancer Screening:     General: Risks and Benefits Discussed    Due for: Colonoscopy - Low Risk      Breast Cancer Screening:     General: Risks and Benefits Discussed    Due for: Mammogram        Cervical Cancer Screening:    General: Risks and Benefits Discussed    Due for: Cervical Pap Smear      Abdominal Aortic Aneurysm (AAA) Screening:        General: Risks and Benefits Discussed    Due for: Screening AAA Ultrasound      Lung Cancer Screening:     General: Screening Not Indicated and Risks and Benefits Discussed    Due for: Low Dose CT (LDCT)      Hepatitis C Screening:    General: Patient Declines and Risks and Benefits Discussed    Hep C Screening Accepted: No       Preventive Screening Comments: Patient will schedule pap for cervical cancer screening at her convenience  Screening, Brief Intervention, and Referral to Treatment (SBIRT)    Screening  Typical number of drinks in a day: 0  Typical number of drinks in a week: 0  Interpretation: Low risk drinking behavior      Single Item Drug Screening:  How often have you used an illegal drug (including marijuana) or a prescription medication for non-medical reasons in the past year? never    Single Item Drug Screen Score: 0  Interpretation: Negative screen for possible drug use disorder    Review of Current Opioid Use    Opioid Risk Tool (ORT) Interpretation: Complete Opioid Risk Tool (ORT)    No exam data present     Physical Exam:     /78 (BP Location: Left arm, Patient Position: Standing, Cuff Size: Standard)   Pulse 104   Temp 98 2 °F (36 8 °C) (Tympanic)   Ht 5' 8" (1 727 m)   Wt 81 7 kg (180 lb 3 2 oz)   SpO2 98%   BMI 27 40 kg/m²     Physical Exam     Gregor Medeiros DO

## 2022-09-30 ENCOUNTER — TELEPHONE (OUTPATIENT)
Dept: FAMILY MEDICINE CLINIC | Facility: CLINIC | Age: 52
End: 2022-09-30

## 2022-09-30 PROBLEM — R73.01 IMPAIRED FASTING GLUCOSE: Status: ACTIVE | Noted: 2022-09-30

## 2022-09-30 PROBLEM — E78.2 MIXED HYPERLIPIDEMIA: Status: ACTIVE | Noted: 2022-09-30

## 2022-09-30 LAB
25(OH)D3+25(OH)D2 SERPL-MCNC: 30.2 NG/ML (ref 30–100)
ALBUMIN SERPL-MCNC: 4.4 G/DL (ref 3.8–4.9)
ALBUMIN/GLOB SERPL: 1.9 {RATIO} (ref 1.2–2.2)
ALP SERPL-CCNC: 95 IU/L (ref 44–121)
ALT SERPL-CCNC: 14 IU/L (ref 0–32)
AST SERPL-CCNC: 21 IU/L (ref 0–40)
BASOPHILS # BLD AUTO: 0.1 X10E3/UL (ref 0–0.2)
BASOPHILS NFR BLD AUTO: 1 %
BILIRUB SERPL-MCNC: <0.2 MG/DL (ref 0–1.2)
BUN SERPL-MCNC: 8 MG/DL (ref 6–24)
BUN/CREAT SERPL: 11 (ref 9–23)
CALCIUM SERPL-MCNC: 9.4 MG/DL (ref 8.7–10.2)
CHLORIDE SERPL-SCNC: 103 MMOL/L (ref 96–106)
CHOLEST SERPL-MCNC: 233 MG/DL (ref 100–199)
CHOLEST/HDLC SERPL: 4 RATIO (ref 0–4.4)
CO2 SERPL-SCNC: 22 MMOL/L (ref 20–29)
CREAT SERPL-MCNC: 0.71 MG/DL (ref 0.57–1)
EGFR: 102 ML/MIN/1.73
EOSINOPHIL # BLD AUTO: 0.8 X10E3/UL (ref 0–0.4)
EOSINOPHIL NFR BLD AUTO: 12 %
ERYTHROCYTE [DISTWIDTH] IN BLOOD BY AUTOMATED COUNT: 17 % (ref 11.7–15.4)
FERRITIN SERPL-MCNC: 10 NG/ML (ref 15–150)
GLOBULIN SER-MCNC: 2.3 G/DL (ref 1.5–4.5)
GLUCOSE SERPL-MCNC: 110 MG/DL (ref 70–99)
HCT VFR BLD AUTO: 32.3 % (ref 34–46.6)
HDLC SERPL-MCNC: 58 MG/DL
HGB BLD-MCNC: 9.8 G/DL (ref 11.1–15.9)
IMM GRANULOCYTES # BLD: 0 X10E3/UL (ref 0–0.1)
IMM GRANULOCYTES NFR BLD: 0 %
IRON SATN MFR SERPL: 9 % (ref 15–55)
IRON SERPL-MCNC: 41 UG/DL (ref 27–159)
LDLC SERPL CALC-MCNC: 136 MG/DL (ref 0–99)
LYMPHOCYTES # BLD AUTO: 2.4 X10E3/UL (ref 0.7–3.1)
LYMPHOCYTES NFR BLD AUTO: 37 %
MCH RBC QN AUTO: 18.7 PG (ref 26.6–33)
MCHC RBC AUTO-ENTMCNC: 30.3 G/DL (ref 31.5–35.7)
MCV RBC AUTO: 62 FL (ref 79–97)
MONOCYTES # BLD AUTO: 0.3 X10E3/UL (ref 0.1–0.9)
MONOCYTES NFR BLD AUTO: 5 %
NEUTROPHILS # BLD AUTO: 3 X10E3/UL (ref 1.4–7)
NEUTROPHILS NFR BLD AUTO: 45 %
PLATELET # BLD AUTO: 536 X10E3/UL (ref 150–450)
POTASSIUM SERPL-SCNC: 4.3 MMOL/L (ref 3.5–5.2)
PROT SERPL-MCNC: 6.7 G/DL (ref 6–8.5)
RBC # BLD AUTO: 5.25 X10E6/UL (ref 3.77–5.28)
SL AMB VLDL CHOLESTEROL CALC: 39 MG/DL (ref 5–40)
SODIUM SERPL-SCNC: 138 MMOL/L (ref 134–144)
TIBC SERPL-MCNC: 448 UG/DL (ref 250–450)
TRIGL SERPL-MCNC: 220 MG/DL (ref 0–149)
TSH SERPL DL<=0.005 MIU/L-ACNC: 0.85 UIU/ML (ref 0.45–4.5)
UIBC SERPL-MCNC: 407 UG/DL (ref 131–425)
VIT B12 SERPL-MCNC: 1383 PG/ML (ref 232–1245)
WBC # BLD AUTO: 6.6 X10E3/UL (ref 3.4–10.8)

## 2022-09-30 NOTE — TELEPHONE ENCOUNTER
Yes, the coffee creamer could have caused the glucose elevation  I have ordered A1c to be done in 6 months when I recheck her sugar  No need to check sooner    She is to see hematology (not rheumatology? )   Referral was placed for hematology when I reviewed labs

## 2022-09-30 NOTE — TELEPHONE ENCOUNTER
Pt is aware of her recent lab results and advice  GLENYN Ramirez is also now scheduled for PAP on 10/10/2022 and will sign records release for her pain management neuro at that time  Pt does not have a rheumatologist      Pt states that her glucose may have been elevated due to the fact that she had consumed coffee with creamer 2 hours before the test   Pt stated that she did not know the test was a fasting test     Pt also asked if her A1C was tested  It appears that it was not  Advice requested -  Would you like pt to re-test soon for the glucose and would you like the pt to test for A1C (lab collect or POCT? Please review and advise

## 2022-09-30 NOTE — TELEPHONE ENCOUNTER
I left a VM advising pt to call the office for her recent test results and advise  Upon call back, schedule patient for PAP exam     Also, see if patient is agreeable to signing records release for her rheumatologist      We can mail the form to her home

## 2022-09-30 NOTE — TELEPHONE ENCOUNTER
----- Message from Aby Segovia DO sent at 9/30/2022  8:08 AM EDT -----  Can let patient know that I reviewed her results  Her cbc showed anemia with hemoglobin of 9 8  her iron storage level (ferritin) was also low  B12 and vitamin d were okay  Her fasting glucose was slightly elevated at 110 consistent with diagnosis of impaired fasting glucose (pre-diabetes)  Should just try and watch diet, decreasing sweets, simple carbs  Cholesterol also a little elevated  Recommend diet/exercise for this  Will place referral to hematology to discuss whether she needs to resume her iron infusions as she has been doing in past    Should have glucose, A1c and lipid panel repeated in 6 months

## 2022-10-03 ENCOUNTER — TELEPHONE (OUTPATIENT)
Dept: HEMATOLOGY ONCOLOGY | Facility: CLINIC | Age: 52
End: 2022-10-03

## 2022-10-04 ENCOUNTER — TELEPHONE (OUTPATIENT)
Dept: HEMATOLOGY ONCOLOGY | Facility: CLINIC | Age: 52
End: 2022-10-04

## 2022-10-04 NOTE — TELEPHONE ENCOUNTER
Made attempt to schedule new patient consult  A voicemail was left with [facility] phone number instructing patient to call back and schedule

## 2022-10-05 ENCOUNTER — TELEPHONE (OUTPATIENT)
Dept: HEMATOLOGY ONCOLOGY | Facility: CLINIC | Age: 52
End: 2022-10-05

## 2022-10-05 NOTE — TELEPHONE ENCOUNTER
Made attempt to schedule new patient consult  A voicemail was left with [facility] phone number instructing patient to call back and schedule  Third attempt to contact patient unsuccessfully - referral closed, and department letter mailed to patient

## 2022-10-11 ENCOUNTER — PATIENT MESSAGE (OUTPATIENT)
Dept: FAMILY MEDICINE CLINIC | Facility: CLINIC | Age: 52
End: 2022-10-11

## 2022-10-15 PROBLEM — F32.9 MAJOR DEPRESSIVE DISORDER: Status: RESOLVED | Noted: 2022-09-27 | Resolved: 2022-10-15

## 2022-10-15 PROBLEM — F06.31 MOOD DISORDER DUE TO KNOWN PHYSIOLOGICAL CONDITION WITH DEPRESSIVE FEATURES: Status: ACTIVE | Noted: 2022-10-15

## 2022-10-20 ENCOUNTER — TELEMEDICINE (OUTPATIENT)
Dept: PSYCHIATRY | Facility: CLINIC | Age: 52
End: 2022-10-20
Payer: COMMERCIAL

## 2022-10-20 DIAGNOSIS — F06.31 MOOD DISORDER DUE TO KNOWN PHYSIOLOGICAL CONDITION WITH DEPRESSIVE FEATURES: Primary | ICD-10-CM

## 2022-10-20 PROCEDURE — 99213 OFFICE O/P EST LOW 20 MIN: CPT | Performed by: NURSE PRACTITIONER

## 2022-10-20 RX ORDER — TRAZODONE HYDROCHLORIDE 100 MG/1
TABLET ORAL
Qty: 135 TABLET | Refills: 1 | Status: SHIPPED | OUTPATIENT
Start: 2022-10-20

## 2022-10-20 RX ORDER — DULOXETIN HYDROCHLORIDE 60 MG/1
CAPSULE, DELAYED RELEASE ORAL
Qty: 90 CAPSULE | Refills: 1 | Status: SHIPPED | OUTPATIENT
Start: 2022-10-20

## 2022-10-20 RX ORDER — ARIPIPRAZOLE 10 MG/1
TABLET ORAL
Qty: 90 TABLET | Refills: 1 | Status: SHIPPED | OUTPATIENT
Start: 2022-10-20

## 2022-10-20 RX ORDER — BUSPIRONE HYDROCHLORIDE 10 MG/1
TABLET ORAL
Qty: 270 TABLET | Refills: 1 | Status: SHIPPED | OUTPATIENT
Start: 2022-10-20

## 2022-10-20 NOTE — PATIENT INSTRUCTIONS
Continue Current Tx  Report Problems  Decrease Cymbalta- she had done so  May want to try 90 mg in the future  Increase Kieran Howell 4 months

## 2022-10-20 NOTE — BH TREATMENT PLAN
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      Strengths/Personal Resources for Self-Care: supportive family, supportive friends, taking medications as prescribed, ability to adapt to life changes, ability to communicate needs, ability to communicate well, ability to listen, ability to reason  Area/Areas of need (in own words): depression  1  Long Term Goal: maintain depression  Target Date:6 months - 4/20/2023  Person/Persons responsible for completion of goal: Sanjana  2  Short Term Objective (s) - How will we reach this goal?:   A  Provider new recommended medication/dosage changes and/or continue medication(s): continue current medications as prescribed Cymbalta, Trazodone, Abilify, Buspar   B  N/A   C  N/A  Target Date:6 months - 4/20/2023  Person/Persons Responsible for Completion of Goal: Sanjana  Progress Towards Goals: stable  Treatment Modality: medication management every 4 months  Review due 180 days from date of this plan: 6 months - 4/20/2023  Expected length of service: maintenance  My Physician/PA/NP and I have developed this plan together and I agree to work on the goals and objectives  I understand the treatment goals that were developed for my treatment

## 2022-10-20 NOTE — PSYCH
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  -     busPIRone (BUSPAR) 10 mg tablet; Take 1 PO TID  -     ARIPiprazole (ABILIFY) 10 mg tablet; Take 1 PO Q HS  -     DULoxetine (CYMBALTA) 60 mg delayed release capsule; Take 1 PO QD  -     traZODone (DESYREL) 100 mg tablet; Take 1 to 1 1/2 PO HS PRN          Goals addressed in session:   [place]  Counseling provided:      Treatment Recommendations- Risks Benefits       Immediate Medical/Psychiatric/Psychotherapy Treatments and Any Precautions:     Risks, Benefits And Possible Side Effects Of Medications:  Risks, benefits, and possible side effects of medications explained to patient and patient verbalizes understanding    Controlled Medication Discussion: No records found for controlled prescriptions according to Sil[facility] [address]      Reason for visit is No chief complaint on file  Medication Management     Encounter provider MABLE Zambrano    Provider located at [address] [place] [address]  [phone]      Recent Visits  No visits were found meeting these conditions  Showing recent visits within past 7 days and meeting all other requirements  Today's Visits  Date Type Provider Dept   10/20/22 Telemedicine Bhavik Fagan [facility] today's visits and meeting all other requirements  Future Appointments  No visits were found meeting these conditions  Showing future appointments within next 150 days and meeting all other requirements       The patient was identified by name and date of birth   Jose A Trinidad was informed that this is a telemedicine visit and that the visit is being conducted throughthe [employer] platform  She agrees to proceed     My office door was closed  No one else was in the room  She acknowledged consent and understanding of privacy and security of the video platform  The patient has agreed to participate and understands they can discontinue the visit at any time  Patient is aware this is a billable service  Subjective    Asad Bernal is a 47 y o  female    Here today fore a med check  This was via [facility]    normal appetite    HPI  Mood better since she has been busier  Feels more even  Tried to increase dose of med but gained Wt  No problems with medication  Appetite Sleep good  Health OK   Denies SI/HI    Past Medical History:   Diagnosis Date   • Allergic    • Anemia    • Anxiety    • Depression    • Eating disorder     prior to her gastric bypass was an over-eater   • Iron deficiency    • Migraine    • Obesity    • RSD (reflex sympathetic dystrophy)    • Thalassemia minor    • TIA (transient ischemic attack)     reaction to baclofen? ?       Past Surgical History:   Procedure Laterality Date   •  SECTION      ,    • CHOLECYSTECTOMY     • GASTRIC BYPASS N/A 2005   • TONSILLECTOMY         Current Outpatient Medications   Medication Sig Dispense Refill   • ARIPiprazole (ABILIFY) 10 mg tablet Take 1 PO Q HS 90 tablet 1   • busPIRone (BUSPAR) 10 mg tablet Take 1 PO  tablet 1   • DULoxetine (CYMBALTA) 60 mg delayed release capsule Take 1 PO QD 90 capsule 1   • traZODone (DESYREL) 100 mg tablet Take 1 to 1 1/2 PO HS  tablet 1   • cetirizine (ZyrTEC) 10 mg tablet Take 10 mg by mouth daily     • morphine 20 MG/5ML solution Take 20 mg by mouth 3 (three) times a day     • ondansetron (ZOFRAN) 8 mg tablet Take 8 mg by mouth every 8 (eight) hours as needed for nausea or vomiting     • transdermal buprenorphine (BUTRANS) 20 mcg/hr PTWK TD patch Place 1 patch on the skin once a week       No current facility-administered medications for this visit  Allergies   Allergen Reactions   • Baclofen Confusion     TIA   • Imitrex [Sumatriptan] Chest Pain       Social History     Substance and Sexual Activity   Drug Use Yes   • Types: Marijuana    Comment: medical marijuana       Family History   Problem Relation Age of Onset   • Mental illness Mother    • Depression Mother    • Diabetes Mother         Actually this is moms mom   • ADD / ADHD Mother    • Anxiety disorder Mother    • Heart disease Father    • Cancer Father    • Alcohol abuse Brother    • Completed Suicide  Brother    • Substance Abuse Brother    • Drug abuse Brother    • Psychiatric Illness Brother    • Schizophrenia Brother    • Substance Abuse Son    • Asthma Daughter    • Autoimmune disease Daughter            Objective    Mental status:  Appearance calm and cooperative  and adequate hygiene and grooming   Mood mood appropriate   Affect affect appropriate    Speech a normal rate and fluent   Thought Processes normal thought processes   Hallucinations no hallucinations present    Thought Content no delusions   Abnormal Thoughts no suicidal thoughts  and no homicidal thoughts    Orientation  oriented to person and place and time   Remote Memory short term memory intact and long term memory intact   Attention Span concentration intact   Intellect Appears to be of Average Intelligence   Insight Insight intact   Judgement judgment was intact   Muscle Strength Muscle strength and tone were normal and Normal gait    Language no difficulty naming common objects   Fund of Knowledge displays adequate knowledge of current events   Pain none   Pain Scale        Video Exam    There were no vitals filed for this visit      I spent  minutes directly with the patient during this visit    Patient Instructions   Continue Current Tx  Report Problems  Decrease Cymbalta- she had done so  May want to try 90 mg in the future  Increase Buspar  Retun 4 months     Visit Time    Visit Start Time: 4:02 PM  Visit Stop Time: 4:20 PM  Total Visit Duration: 18 min

## 2022-10-27 ENCOUNTER — TELEPHONE (OUTPATIENT)
Dept: HEMATOLOGY ONCOLOGY | Facility: HOSPITAL | Age: 52
End: 2022-10-27

## 2022-10-27 NOTE — TELEPHONE ENCOUNTER
Called patient and LVM with rescheduled appt  Appt is still on 11/7/22 different time and different provider  Hope Line # provided in case the appointment needed to be rescheduled

## 2022-11-07 ENCOUNTER — TELEPHONE (OUTPATIENT)
Dept: HEMATOLOGY ONCOLOGY | Facility: CLINIC | Age: 52
End: 2022-11-07

## 2022-11-07 NOTE — TELEPHONE ENCOUNTER
Appointment Cancellation Or Reschedule     Person calling in Patient    If other than patient calling, are they listed on the communication consent form? Provider [facility]   Office Visit Date and Time 11/7/22   Office Visit Location [address] [place] [address]   Did patient want to reschedule their office appointment? If so, when was it scheduled to? 12/5/22   Did you have STAR scheduled for this appointment? no   Do you need STAR set up for your new appointment? If yes, please send to "PATIENT RIDESHARE" pool for STAR rescheduling no   If you are cancelling appointment, can we notify STAR to cancel ride? If yes, please send to "PATIENT RIDESHARE" pool for STAR to cancel service o   Is this patient calling to reschedule an infusion appointment? no   When is their next infusion appointment? no   Is this patient a Chemo patient? no   Reason for Cancellation or Reschedule Patient is not feeling well     If the patient is a treatment patient, please route this to the office nurse  If the patient is not on treatment, please route to the office MA  If the patient is a surgical oncology patient, please route to surg/onc clinical pool

## 2022-11-25 ENCOUNTER — TELEPHONE (OUTPATIENT)
Dept: HEMATOLOGY ONCOLOGY | Facility: HOSPITAL | Age: 52
End: 2022-11-25

## 2022-11-25 NOTE — TELEPHONE ENCOUNTER
11/25/22    LVM r/s pt to see Chritsina on 12/5 12PM d/t Dr Analisa Farrell conflict  Apologized for any incontinences it may cause  Hope line number provided

## 2022-12-01 NOTE — PROGRESS NOTES
Hematology/Oncology Outpatient Consult Note  Jose A Trinidad 47 y o  female MRN: @ Encounter: [id number]        Date:  12/5/2022        CC:  Iron deficiency anemia, thalassemia minor trait      HPI:  Jose A Trinidad is a 47year old female with a history of depression/anxiety, RSD on chronic opioids, migraines, gastric bypass in 2005 and iron deficiency anemia, thalassemia minor trait  Patient has received parental iron infusions in past, last given in 2020  She previously followed with [facility] associates  Patient live closer to [facility] and has transitioned her care to [address] [place] [address], she is referred to Hematology closer to home  She seen for initial consultation 12/5/2022 regarding iron deficiency anemia on recent blood work    Most recent blood work completed on 9/29/22 is consistent with WADE  She has microcytic, hypochromic anemia  Hgb 9 8, MCV 62, MCH 18 7, MCHC 30, RDW 17  Platelets elevated 278 indicating a likely reactive thrombocytosis given low MCV and anemia  Ferritin 10  Serum iron 41, iron sat 9%    Labs reviewed in care everywhere do show history of iron deficiency anemia back in 2020  Ferritin at that time was 2    Patient is symptomatic with fatigue  She has also had strong cravings for chewing ice  She has restless legs and occasional lightheadedness  She has chronic pain from her history of RSD and often has headaches that she associates with her known migraine  Denies chest pain, shortness of breath, heart palpitations  Patient reports she has never had any difficulty tolerating IV iron in the past   She has typically required IV iron every couple years  She denies any abnormal bleeding  No melena, hematochezia  She has never undergone a GI workup  She has been referred for screening colonoscopy      Previous Hematologic/ Oncologic History:    IV iron       Test Results:    Imaging: No results found      Labs:   Lab Results   Component Value Date    WBC 6 6 09/29/2022    HGB 9 8 (L) 09/29/2022    HCT 32 3 (L) 09/29/2022    MCV 62 (L) 09/29/2022     (H) 09/29/2022     Lab Results   Component Value Date    K 4 3 09/29/2022     09/29/2022    CO2 22 09/29/2022    BUN 8 09/29/2022    CREATININE 0 71 09/29/2022    AST 21 09/29/2022    ALT 14 09/29/2022    EGFR 102 09/29/2022             ROS:  Review of Systems   Constitutional: Positive for fatigue  Musculoskeletal: Positive for arthralgias and myalgias  Neurological: Positive for light-headedness  All other systems reviewed and are negative  Active Problems:   Patient Active Problem List   Diagnosis   • RSD (reflex sympathetic dystrophy)   • Chronic pain syndrome   • Thalassemia minor   • History of gastric bypass   • History of tobacco use   • Anxiety   • Family history of premature CAD   • Iron deficiency   • Continuous opioid dependence ([facility] [address] )   • Impaired fasting glucose   • Mixed hyperlipidemia   • 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 ?       Surgical History:   Past Surgical History:   Procedure Laterality Date   • [address], 1996   • CHOLECYSTECTOMY  1995   • GASTRIC BYPASS N/A 09/21/2005   • TONSILLECTOMY         Family History:    Family History   Problem Relation Age of Onset   • Mental illness Mother    • Depression Mother    • Diabetes Mother         Actually this is moms mom   • ADD / ADHD Mother    • Anxiety disorder Mother    • Heart disease Father    • Cancer Father    • Alcohol abuse Brother    • Completed Suicide  Brother    • Substance Abuse Brother    • Drug abuse Brother    • Psychiatric Illness Brother    • Schizophrenia Brother    • Substance Abuse Son    • Asthma Daughter    • Autoimmune disease Daughter        Cancer-related family history includes Cancer in her father  Social History:   Social History     Socioeconomic History   • Marital status: Single     Spouse name: Not on file   • Number of children: Not on file   • Years of education: Not on file   • Highest education level: Not on file   Occupational History   • Not on file   Tobacco Use   • Smoking status: Former     Packs/day: 1 00     Years: 25 00     Pack years: 25 00     Types: Cigarettes     Start date: 1982     Quit date: 2009     Years since quittin 6   • Smokeless tobacco: Never   Vaping Use   • Vaping Use: Never used   Substance and Sexual Activity   • Alcohol use: Not Currently   • Drug use: Yes     Types: Marijuana     Comment: medical marijuana   • Sexual activity: Not Currently     Partners: Male     Birth control/protection: Condom Male   Other Topics Concern   • Not on file   Social History Narrative        2 children who are recovering addicts  Daughter lives in [place] and son lives with her    Is a "Universal "     Social Determinants of Health     Financial Resource Strain: Low Risk    • Difficulty of Paying Living Expenses: Not hard at all   Food Insecurity: Not on file   Transportation Needs: Unmet Transportation Needs   • Lack of Transportation (Medical):  Yes   • Lack of Transportation (Non-Medical): Yes   Physical Activity: Not on file   Stress: Not on file   Social Connections: Not on file   Intimate Partner Violence: Not on file   Housing Stability: Not on file       Current Medications:   Current Outpatient Medications   Medication Sig Dispense Refill   • ARIPiprazole (ABILIFY) 10 mg tablet Take 1 PO Q HS 90 tablet 1   • busPIRone (BUSPAR) 10 mg tablet Take 1 PO  tablet 1   • cetirizine (ZyrTEC) 10 mg tablet Take 10 mg by mouth daily     • DULoxetine (CYMBALTA) 60 mg delayed release capsule Take 1 PO QD 90 capsule 1   • morphine 20 MG/5ML solution Take 20 mg by mouth 3 (three) times a day     • ondansetron (ZOFRAN) 8 mg tablet Take 8 mg by mouth every 8 (eight) hours as needed for nausea or vomiting     • transdermal buprenorphine (BUTRANS) 20 mcg/hr PTWK TD patch Place 1 patch on the skin once a week     • traZODone (DESYREL) 100 mg tablet Take 1 to 1 1/2 PO HS  tablet 1     No current facility-administered medications for this visit  Allergies: Allergies   Allergen Reactions   • Baclofen Confusion     TIA   • Imitrex [Sumatriptan] Chest Pain         Physical Exam:    Body surface area is 1 99 meters squared  Wt Readings from Last 3 Encounters:   12/05/22 84 8 kg (187 lb)   09/27/22 81 7 kg (180 lb 3 2 oz)   09/01/21 72 6 kg (160 lb)        Temp Readings from Last 3 Encounters:   12/05/22 (!) 96 9 °F (36 1 °C) (Tympanic)   09/27/22 98 2 °F (36 8 °C) (Tympanic)   09/01/21 98 2 °F (36 8 °C) (Temporal)        BP Readings from Last 3 Encounters:   12/05/22 124/86   09/27/22 124/78   09/01/21 140/70         Pulse Readings from Last 3 Encounters:   12/05/22 78   09/27/22 104   09/01/21 78          Physical Exam  Constitutional:       General: She is not in acute distress  Appearance: Normal appearance  HENT:      Head: Normocephalic and atraumatic  Eyes:      General: No scleral icterus  Right eye: No discharge  Left eye: No discharge  Conjunctiva/sclera: Conjunctivae normal    Cardiovascular:      Rate and Rhythm: Normal rate and regular rhythm  Pulmonary:      Effort: Pulmonary effort is normal  No respiratory distress  Breath sounds: Normal breath sounds  Abdominal:      General: Bowel sounds are normal  There is no distension  Palpations: Abdomen is soft  Musculoskeletal:         General: Normal range of motion  Cervical back: Normal range of motion     Lymphadenopathy:      Cervical: No cervical adenopathy  Skin:     General: Skin is warm and dry  Neurological:      General: No focal deficit present  Mental Status: She is alert and oriented to person, place, and time  Psychiatric:         Mood and Affect: Mood normal          Behavior: Behavior normal               Assessment/ Plan:    1  History of gastric bypass    2  Iron deficiency      The patient is a very pleasant 23-year-old female with a history of gastric bypass in 2005 and iron deficiency anemia likely secondary to postsurgical malabsorption  She reports she has needed parental iron replacement multiple times over the years  Her most recent blood work is consistent with recurrent iron deficiency anemia  She has microcytic, hypochromic anemia  Hgb 9 8, MCV 62, MCH 18 7, MCHC 30, RDW 17  Platelets elevated 998 indicating a likely reactive thrombocytosis given low MCV and anemia  Ferritin 10  She is symptomatic and will benefit from iron replacement    She has never had a GI workup  Her PCP has referred her to GI for screening colonoscopy  I agree  We did discuss IV iron replacement  She has never had any side effects from IV Venofer in the past   However, I did review  Potential side effects of IV iron could include but may not be limited to: change in taste, diarrhea, muscle cramps, nausea or vomiting, pain in the arms or legs, pain or burning sensation in the injection site, allergic reaction  The patient verbalized understanding and wishes to proceed  I will set her up for IV Venofer 200 mg twice a week x8 doses  I will also give her B12 1000 mcg IM weekly x4 doses  Patient was in agreement with this plan of care  I will see her back in 4 months with repeat blood work to the out she is feeling and how her numbers have responded to infusion therapy  Given her history, she may benefit from maintenance iron infusions to prevent her from dropping so low  Will re-evaluate at her follow-up visit    She is instructed to call at any time with questions or concerns    Barriers: None  Patient  able to self care  Portions of the record may have been created with voice recognition software  Occasional wrong word or "sound a like" substitutions may have occurred due to the inherent limitations of voice recognition software  Read the chart carefully and recognize, using context, where substitutions have occurred

## 2022-12-02 ENCOUNTER — TELEPHONE (OUTPATIENT)
Dept: HEMATOLOGY ONCOLOGY | Facility: CLINIC | Age: 52
End: 2022-12-02

## 2022-12-02 NOTE — TELEPHONE ENCOUNTER
Appointment Confirmation (to confirm pre existing appointments - ONLY)  No need to route   Appointment with  Tri Ludiwg   Appointment date & time  12/05/22 12PM   Location [facility]   Patient verbilized Understanding Yes

## 2022-12-05 ENCOUNTER — TELEPHONE (OUTPATIENT)
Dept: HEMATOLOGY ONCOLOGY | Facility: HOSPITAL | Age: 52
End: 2022-12-05

## 2022-12-05 ENCOUNTER — CONSULT (OUTPATIENT)
Dept: HEMATOLOGY ONCOLOGY | Facility: HOSPITAL | Age: 52
End: 2022-12-05

## 2022-12-05 VITALS
HEART RATE: 78 BPM | SYSTOLIC BLOOD PRESSURE: 124 MMHG | WEIGHT: 187 LBS | OXYGEN SATURATION: 94 % | BODY MASS INDEX: 28.34 KG/M2 | RESPIRATION RATE: 16 BRPM | TEMPERATURE: 96.9 F | DIASTOLIC BLOOD PRESSURE: 86 MMHG | HEIGHT: 68 IN

## 2022-12-05 DIAGNOSIS — E61.1 IRON DEFICIENCY: ICD-10-CM

## 2022-12-05 DIAGNOSIS — Z98.84 HISTORY OF GASTRIC BYPASS: Primary | ICD-10-CM

## 2022-12-05 RX ORDER — CYANOCOBALAMIN 1000 UG/ML
1000 INJECTION, SOLUTION INTRAMUSCULAR; SUBCUTANEOUS ONCE
Status: CANCELLED | OUTPATIENT
Start: 2022-12-09

## 2022-12-05 RX ORDER — SODIUM CHLORIDE 9 MG/ML
20 INJECTION, SOLUTION INTRAVENOUS ONCE
Status: CANCELLED | OUTPATIENT
Start: 2022-12-05

## 2022-12-05 RX ORDER — SODIUM CHLORIDE 9 MG/ML
20 INJECTION, SOLUTION INTRAVENOUS ONCE
Status: CANCELLED | OUTPATIENT
Start: 2022-12-09

## 2022-12-05 NOTE — TELEPHONE ENCOUNTER
While we try to accommodate patient requests, our priority is to schedule treatment according to Doctor's orders and site availability  Does the Provider use the intake sheet or checkout note? Check out   What would be a preferred day of the week that would work best for your infusion appointment? Tuesday/Friday  Do you prefer mornings or afternoons for your appointments? Afternoon  Are there any days or dates that do not work for your schedule, including any upcoming vacations? N/A  We are going to try our best to schedule you at the infusion center closest to your home  In the event that we are unable to what would be your next preferred infusion site or sites? [facility]  Do you have transportation to take you to all of your appointments?  Yes  Would you like the infusion center to draw labs from your port? (disregard if patient doesn't have a port or need labs for infusion appointment)

## 2022-12-09 ENCOUNTER — HOSPITAL ENCOUNTER (OUTPATIENT)
Dept: INFUSION CENTER | Facility: HOSPITAL | Age: 52
End: 2022-12-09
Attending: INTERNAL MEDICINE

## 2022-12-09 VITALS
RESPIRATION RATE: 20 BRPM | SYSTOLIC BLOOD PRESSURE: 119 MMHG | OXYGEN SATURATION: 100 % | DIASTOLIC BLOOD PRESSURE: 56 MMHG | HEART RATE: 89 BPM | TEMPERATURE: 96.2 F

## 2022-12-09 DIAGNOSIS — E61.1 IRON DEFICIENCY: ICD-10-CM

## 2022-12-09 DIAGNOSIS — Z98.84 HISTORY OF GASTRIC BYPASS: Primary | ICD-10-CM

## 2022-12-09 DIAGNOSIS — E61.1 IRON DEFICIENCY: Primary | ICD-10-CM

## 2022-12-09 DIAGNOSIS — Z98.84 HISTORY OF GASTRIC BYPASS: ICD-10-CM

## 2022-12-09 RX ORDER — CYANOCOBALAMIN 1000 UG/ML
1000 INJECTION, SOLUTION INTRAMUSCULAR; SUBCUTANEOUS ONCE
Status: CANCELLED | OUTPATIENT
Start: 2022-12-13

## 2022-12-09 RX ORDER — SODIUM CHLORIDE 9 MG/ML
20 INJECTION, SOLUTION INTRAVENOUS ONCE
Status: CANCELLED | OUTPATIENT
Start: 2022-12-11

## 2022-12-09 RX ORDER — SODIUM CHLORIDE 9 MG/ML
20 INJECTION, SOLUTION INTRAVENOUS ONCE
Status: COMPLETED | OUTPATIENT
Start: 2022-12-09 | End: 2022-12-09

## 2022-12-09 RX ORDER — SODIUM CHLORIDE 9 MG/ML
20 INJECTION, SOLUTION INTRAVENOUS ONCE
Status: CANCELLED | OUTPATIENT
Start: 2022-12-09

## 2022-12-09 RX ORDER — SODIUM CHLORIDE 9 MG/ML
20 INJECTION, SOLUTION INTRAVENOUS ONCE
Status: CANCELLED | OUTPATIENT
Start: 2022-12-13

## 2022-12-09 RX ORDER — CYANOCOBALAMIN 1000 UG/ML
1000 INJECTION, SOLUTION INTRAMUSCULAR; SUBCUTANEOUS ONCE
Status: COMPLETED | OUTPATIENT
Start: 2022-12-09 | End: 2022-12-09

## 2022-12-09 RX ADMIN — CYANOCOBALAMIN 1000 MCG: 1000 INJECTION, SOLUTION INTRAMUSCULAR at 14:14

## 2022-12-09 RX ADMIN — IRON SUCROSE 200 MG: 20 INJECTION, SOLUTION INTRAVENOUS at 14:13

## 2022-12-09 RX ADMIN — SODIUM CHLORIDE 20 ML/HR: 9 INJECTION, SOLUTION INTRAVENOUS at 14:13

## 2022-12-13 ENCOUNTER — HOSPITAL ENCOUNTER (OUTPATIENT)
Dept: INFUSION CENTER | Facility: HOSPITAL | Age: 52
Discharge: HOME | End: 2022-12-13
Attending: INTERNAL MEDICINE

## 2022-12-13 VITALS
DIASTOLIC BLOOD PRESSURE: 69 MMHG | HEART RATE: 89 BPM | TEMPERATURE: 97.8 F | SYSTOLIC BLOOD PRESSURE: 143 MMHG | RESPIRATION RATE: 18 BRPM

## 2022-12-13 DIAGNOSIS — Z98.84 HISTORY OF GASTRIC BYPASS: ICD-10-CM

## 2022-12-13 DIAGNOSIS — E61.1 IRON DEFICIENCY: Primary | ICD-10-CM

## 2022-12-13 RX ORDER — SODIUM CHLORIDE 9 MG/ML
20 INJECTION, SOLUTION INTRAVENOUS ONCE
Status: COMPLETED | OUTPATIENT
Start: 2022-12-13 | End: 2022-12-13

## 2022-12-13 RX ORDER — SODIUM CHLORIDE 9 MG/ML
20 INJECTION, SOLUTION INTRAVENOUS ONCE
Status: CANCELLED | OUTPATIENT
Start: 2022-12-16

## 2022-12-13 RX ADMIN — SODIUM CHLORIDE 20 ML/HR: 9 INJECTION, SOLUTION INTRAVENOUS at 14:33

## 2022-12-13 RX ADMIN — IRON SUCROSE 200 MG: 20 INJECTION, SOLUTION INTRAVENOUS at 14:33

## 2022-12-16 ENCOUNTER — HOSPITAL ENCOUNTER (OUTPATIENT)
Dept: INFUSION CENTER | Facility: HOSPITAL | Age: 52
End: 2022-12-16
Attending: INTERNAL MEDICINE

## 2022-12-16 VITALS
RESPIRATION RATE: 18 BRPM | DIASTOLIC BLOOD PRESSURE: 79 MMHG | OXYGEN SATURATION: 100 % | HEART RATE: 76 BPM | SYSTOLIC BLOOD PRESSURE: 169 MMHG | TEMPERATURE: 98.6 F

## 2022-12-16 DIAGNOSIS — E61.1 IRON DEFICIENCY: Primary | ICD-10-CM

## 2022-12-16 DIAGNOSIS — Z98.84 HISTORY OF GASTRIC BYPASS: ICD-10-CM

## 2022-12-16 RX ORDER — SODIUM CHLORIDE 9 MG/ML
20 INJECTION, SOLUTION INTRAVENOUS ONCE
Status: COMPLETED | OUTPATIENT
Start: 2022-12-16 | End: 2022-12-16

## 2022-12-16 RX ORDER — CYANOCOBALAMIN 1000 UG/ML
1000 INJECTION, SOLUTION INTRAMUSCULAR; SUBCUTANEOUS ONCE
Status: COMPLETED | OUTPATIENT
Start: 2022-12-16 | End: 2022-12-16

## 2022-12-16 RX ORDER — SODIUM CHLORIDE 9 MG/ML
20 INJECTION, SOLUTION INTRAVENOUS ONCE
Status: CANCELLED | OUTPATIENT
Start: 2022-12-23

## 2022-12-16 RX ORDER — CYANOCOBALAMIN 1000 UG/ML
1000 INJECTION, SOLUTION INTRAMUSCULAR; SUBCUTANEOUS ONCE
OUTPATIENT
Start: 2022-12-23

## 2022-12-16 RX ADMIN — CYANOCOBALAMIN 1000 MCG: 1000 INJECTION, SOLUTION INTRAMUSCULAR at 16:08

## 2022-12-16 RX ADMIN — SODIUM CHLORIDE 20 ML/HR: 9 INJECTION, SOLUTION INTRAVENOUS at 14:35

## 2022-12-16 RX ADMIN — IRON SUCROSE 200 MG: 20 INJECTION, SOLUTION INTRAVENOUS at 14:43

## 2022-12-16 NOTE — PROGRESS NOTES
Pt  Denied new symptoms or concerns today  Venofer tolerated well without adverse event  Vitamin B12 given IM in left deltoid  Pt  Tolerated well  Future appointments reviewed  AVS  Declined

## 2022-12-23 ENCOUNTER — HOSPITAL ENCOUNTER (OUTPATIENT)
Dept: INFUSION CENTER | Facility: HOSPITAL | Age: 52
Discharge: HOME | End: 2022-12-23
Attending: INTERNAL MEDICINE

## 2022-12-27 ENCOUNTER — HOSPITAL ENCOUNTER (OUTPATIENT)
Dept: INFUSION CENTER | Facility: HOSPITAL | Age: 52
End: 2022-12-27
Attending: INTERNAL MEDICINE

## 2022-12-29 DIAGNOSIS — Z98.84 HISTORY OF GASTRIC BYPASS: Primary | ICD-10-CM

## 2022-12-29 DIAGNOSIS — E61.1 IRON DEFICIENCY: ICD-10-CM

## 2022-12-29 RX ORDER — SODIUM CHLORIDE 9 MG/ML
20 INJECTION, SOLUTION INTRAVENOUS ONCE
Status: CANCELLED | OUTPATIENT
Start: 2022-12-30

## 2022-12-30 ENCOUNTER — HOSPITAL ENCOUNTER (OUTPATIENT)
Dept: INFUSION CENTER | Facility: HOSPITAL | Age: 52
Discharge: HOME | End: 2022-12-30
Attending: INTERNAL MEDICINE

## 2023-01-03 ENCOUNTER — HOSPITAL ENCOUNTER (OUTPATIENT)
Dept: INFUSION CENTER | Facility: HOSPITAL | Age: 53
Discharge: HOME | End: 2023-01-03
Attending: INTERNAL MEDICINE

## 2023-01-03 VITALS
SYSTOLIC BLOOD PRESSURE: 161 MMHG | HEART RATE: 80 BPM | DIASTOLIC BLOOD PRESSURE: 79 MMHG | TEMPERATURE: 96.5 F | RESPIRATION RATE: 16 BRPM

## 2023-01-03 DIAGNOSIS — Z98.84 HISTORY OF GASTRIC BYPASS: Primary | ICD-10-CM

## 2023-01-03 DIAGNOSIS — E61.1 IRON DEFICIENCY: ICD-10-CM

## 2023-01-03 DIAGNOSIS — Z98.84 HISTORY OF GASTRIC BYPASS: ICD-10-CM

## 2023-01-03 DIAGNOSIS — E61.1 IRON DEFICIENCY: Primary | ICD-10-CM

## 2023-01-03 RX ORDER — SODIUM CHLORIDE 9 MG/ML
20 INJECTION, SOLUTION INTRAVENOUS ONCE
Status: CANCELLED | OUTPATIENT
Start: 2023-01-06

## 2023-01-03 RX ORDER — SODIUM CHLORIDE 9 MG/ML
20 INJECTION, SOLUTION INTRAVENOUS ONCE
Status: CANCELLED | OUTPATIENT
Start: 2023-01-03

## 2023-01-03 RX ORDER — CYANOCOBALAMIN 1000 UG/ML
1000 INJECTION, SOLUTION INTRAMUSCULAR; SUBCUTANEOUS ONCE
Status: COMPLETED | OUTPATIENT
Start: 2023-01-03 | End: 2023-01-03

## 2023-01-03 RX ORDER — SODIUM CHLORIDE 9 MG/ML
20 INJECTION, SOLUTION INTRAVENOUS ONCE
Status: COMPLETED | OUTPATIENT
Start: 2023-01-03 | End: 2023-01-03

## 2023-01-03 RX ORDER — CYANOCOBALAMIN 1000 UG/ML
1000 INJECTION, SOLUTION INTRAMUSCULAR; SUBCUTANEOUS ONCE
Status: CANCELLED | OUTPATIENT
Start: 2023-01-10

## 2023-01-03 RX ADMIN — SODIUM CHLORIDE 20 ML/HR: 9 INJECTION, SOLUTION INTRAVENOUS at 14:48

## 2023-01-03 RX ADMIN — IRON SUCROSE 200 MG: 20 INJECTION, SOLUTION INTRAVENOUS at 14:48

## 2023-01-03 RX ADMIN — CYANOCOBALAMIN 1000 MCG: 1000 INJECTION, SOLUTION INTRAMUSCULAR at 14:54

## 2023-01-03 NOTE — PROGRESS NOTES
Pt tolerated venofer without incident  Next appt confirmed, AVS provided   Left unit ambulatory with steady gait

## 2023-01-06 ENCOUNTER — HOSPITAL ENCOUNTER (OUTPATIENT)
Dept: INFUSION CENTER | Facility: HOSPITAL | Age: 53
End: 2023-01-06
Attending: INTERNAL MEDICINE

## 2023-01-06 VITALS
DIASTOLIC BLOOD PRESSURE: 87 MMHG | SYSTOLIC BLOOD PRESSURE: 142 MMHG | TEMPERATURE: 97.5 F | HEART RATE: 69 BPM | RESPIRATION RATE: 18 BRPM

## 2023-01-06 DIAGNOSIS — E61.1 IRON DEFICIENCY: Primary | ICD-10-CM

## 2023-01-06 DIAGNOSIS — Z98.84 HISTORY OF GASTRIC BYPASS: ICD-10-CM

## 2023-01-06 RX ORDER — SODIUM CHLORIDE 9 MG/ML
20 INJECTION, SOLUTION INTRAVENOUS ONCE
Status: COMPLETED | OUTPATIENT
Start: 2023-01-06 | End: 2023-01-06

## 2023-01-06 RX ORDER — SODIUM CHLORIDE 9 MG/ML
20 INJECTION, SOLUTION INTRAVENOUS ONCE
Status: CANCELLED | OUTPATIENT
Start: 2023-01-10

## 2023-01-06 RX ADMIN — SODIUM CHLORIDE 20 ML/HR: 9 INJECTION, SOLUTION INTRAVENOUS at 14:16

## 2023-01-06 RX ADMIN — IRON SUCROSE 200 MG: 20 INJECTION, SOLUTION INTRAVENOUS at 14:17

## 2023-01-06 NOTE — PROGRESS NOTES
Pt here for Venofer infusing  States she has been feeling much better since she started Venofer  PIV inserted, Venofer infused, PIV removed intact, pressure dssg applied  Disch amb to home, steady gait

## 2023-01-10 ENCOUNTER — HOSPITAL ENCOUNTER (OUTPATIENT)
Dept: INFUSION CENTER | Facility: HOSPITAL | Age: 53
Discharge: HOME | End: 2023-01-10
Attending: INTERNAL MEDICINE

## 2023-01-10 VITALS
DIASTOLIC BLOOD PRESSURE: 62 MMHG | HEART RATE: 85 BPM | SYSTOLIC BLOOD PRESSURE: 120 MMHG | OXYGEN SATURATION: 98 % | RESPIRATION RATE: 16 BRPM | TEMPERATURE: 97.1 F

## 2023-01-10 DIAGNOSIS — Z98.84 HISTORY OF GASTRIC BYPASS: ICD-10-CM

## 2023-01-10 DIAGNOSIS — E61.1 IRON DEFICIENCY: Primary | ICD-10-CM

## 2023-01-10 RX ORDER — CYANOCOBALAMIN 1000 UG/ML
1000 INJECTION, SOLUTION INTRAMUSCULAR; SUBCUTANEOUS ONCE
Status: CANCELLED | OUTPATIENT
Start: 2023-01-10

## 2023-01-10 RX ORDER — SODIUM CHLORIDE 9 MG/ML
20 INJECTION, SOLUTION INTRAVENOUS ONCE
Status: CANCELLED | OUTPATIENT
Start: 2023-01-13

## 2023-01-10 RX ORDER — CYANOCOBALAMIN 1000 UG/ML
1000 INJECTION, SOLUTION INTRAMUSCULAR; SUBCUTANEOUS ONCE
Status: COMPLETED | OUTPATIENT
Start: 2023-01-10 | End: 2023-01-10

## 2023-01-10 RX ORDER — SODIUM CHLORIDE 9 MG/ML
20 INJECTION, SOLUTION INTRAVENOUS ONCE
Status: COMPLETED | OUTPATIENT
Start: 2023-01-10 | End: 2023-01-10

## 2023-01-10 RX ADMIN — SODIUM CHLORIDE 20 ML/HR: 9 INJECTION, SOLUTION INTRAVENOUS at 14:46

## 2023-01-10 RX ADMIN — CYANOCOBALAMIN 1000 MCG: 1000 INJECTION, SOLUTION INTRAMUSCULAR at 14:50

## 2023-01-10 RX ADMIN — IRON SUCROSE 200 MG: 20 INJECTION, SOLUTION INTRAVENOUS at 14:47

## 2023-01-10 NOTE — PROGRESS NOTES
Pt here for Venofer infusion and B12  Pt zurdo well w/o AR seen  PIV removed intact  Dsd applied  Disch amb to home, steady gait

## 2023-01-13 ENCOUNTER — HOSPITAL ENCOUNTER (OUTPATIENT)
Dept: INFUSION CENTER | Facility: HOSPITAL | Age: 53
End: 2023-01-13
Attending: INTERNAL MEDICINE

## 2023-01-13 VITALS
TEMPERATURE: 97.3 F | HEART RATE: 85 BPM | SYSTOLIC BLOOD PRESSURE: 138 MMHG | DIASTOLIC BLOOD PRESSURE: 83 MMHG | RESPIRATION RATE: 16 BRPM | OXYGEN SATURATION: 100 %

## 2023-01-13 DIAGNOSIS — E61.1 IRON DEFICIENCY: Primary | ICD-10-CM

## 2023-01-13 DIAGNOSIS — Z98.84 HISTORY OF GASTRIC BYPASS: ICD-10-CM

## 2023-01-13 RX ORDER — SODIUM CHLORIDE 9 MG/ML
20 INJECTION, SOLUTION INTRAVENOUS ONCE
Status: COMPLETED | OUTPATIENT
Start: 2023-01-13 | End: 2023-01-13

## 2023-01-13 RX ORDER — SODIUM CHLORIDE 9 MG/ML
20 INJECTION, SOLUTION INTRAVENOUS ONCE
Status: CANCELLED | OUTPATIENT
Start: 2023-01-17

## 2023-01-13 RX ADMIN — SODIUM CHLORIDE 20 ML/HR: 9 INJECTION, SOLUTION INTRAVENOUS at 14:47

## 2023-01-13 RX ADMIN — IRON SUCROSE 200 MG: 20 INJECTION, SOLUTION INTRAVENOUS at 14:46

## 2023-01-17 ENCOUNTER — HOSPITAL ENCOUNTER (OUTPATIENT)
Dept: INFUSION CENTER | Facility: HOSPITAL | Age: 53
Discharge: HOME | End: 2023-01-17
Attending: INTERNAL MEDICINE

## 2023-01-17 VITALS
DIASTOLIC BLOOD PRESSURE: 69 MMHG | TEMPERATURE: 97.3 F | RESPIRATION RATE: 16 BRPM | HEART RATE: 82 BPM | SYSTOLIC BLOOD PRESSURE: 127 MMHG | OXYGEN SATURATION: 99 %

## 2023-01-17 DIAGNOSIS — Z98.84 HISTORY OF GASTRIC BYPASS: ICD-10-CM

## 2023-01-17 DIAGNOSIS — E61.1 IRON DEFICIENCY: Primary | ICD-10-CM

## 2023-01-17 RX ORDER — SODIUM CHLORIDE 9 MG/ML
20 INJECTION, SOLUTION INTRAVENOUS ONCE
Status: CANCELLED | OUTPATIENT
Start: 2023-01-20

## 2023-01-17 RX ORDER — SODIUM CHLORIDE 9 MG/ML
20 INJECTION, SOLUTION INTRAVENOUS ONCE
Status: COMPLETED | OUTPATIENT
Start: 2023-01-17 | End: 2023-01-17

## 2023-01-17 RX ADMIN — SODIUM CHLORIDE 20 ML/HR: 9 INJECTION, SOLUTION INTRAVENOUS at 14:45

## 2023-01-17 RX ADMIN — IRON SUCROSE 200 MG: 20 INJECTION, SOLUTION INTRAVENOUS at 14:45

## 2023-01-17 NOTE — PROGRESS NOTES
Pt tolerated treatment with no adv reactions; treatment plan completed; pt left unit amb with steady gait

## 2023-03-01 ENCOUNTER — TELEMEDICINE (OUTPATIENT)
Dept: PSYCHIATRY | Facility: CLINIC | Age: 53
End: 2023-03-01

## 2023-03-01 DIAGNOSIS — F06.31 MOOD DISORDER DUE TO KNOWN PHYSIOLOGICAL CONDITION WITH DEPRESSIVE FEATURES: ICD-10-CM

## 2023-03-01 RX ORDER — ARIPIPRAZOLE 10 MG/1
TABLET ORAL
Qty: 90 TABLET | Refills: 1 | Status: SHIPPED | OUTPATIENT
Start: 2023-03-01

## 2023-03-01 RX ORDER — TRAZODONE HYDROCHLORIDE 100 MG/1
TABLET ORAL
Qty: 135 TABLET | Refills: 1 | Status: SHIPPED | OUTPATIENT
Start: 2023-03-01

## 2023-03-01 RX ORDER — BUSPIRONE HYDROCHLORIDE 10 MG/1
TABLET ORAL
Qty: 270 TABLET | Refills: 1 | Status: SHIPPED | OUTPATIENT
Start: 2023-03-01

## 2023-03-01 RX ORDER — DULOXETIN HYDROCHLORIDE 60 MG/1
CAPSULE, DELAYED RELEASE ORAL
Qty: 90 CAPSULE | Refills: 1 | Status: SHIPPED | OUTPATIENT
Start: 2023-03-01

## 2023-03-01 NOTE — BH TREATMENT PLAN
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      Strengths/Personal Resources for Self-Care: supportive family, supportive friends, taking medications as prescribed, ability to adapt to life changes, ability to communicate needs, ability to communicate well, ability to listen, ability to reason, ability to understand psychiatric illness  Area/Areas of need (in own words): depression  1  Long Term Goal: maintain depression  Target Date:6 months - 9/1/2023  Person/Persons responsible for completion of goal: Sanjana  2  Short Term Objective (s) - How will we reach this goal?:   A  Provider new recommended medication/dosage changes and/or continue medication(s): continue current medications as prescribed Cymbalta, Trazodone, Abilify, Buspar   B  N/A   C  N/A  Target Date:6 months - 9/1/2023  Person/Persons Responsible for Completion of Goal: Sanjana  Progress Towards Goals: stable  Treatment Modality: medication management every 4 months  Review due 180 days from date of this plan: 6 months - 9/1/2023  Expected length of service: maintenance  My Physician/PA/NP and I have developed this plan together and I agree to work on the goals and objectives  I understand the treatment goals that were developed for my treatment
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
## 2023-03-24 ENCOUNTER — TELEPHONE (OUTPATIENT)
Dept: OBGYN CLINIC | Facility: OTHER | Age: 53
End: 2023-03-24

## 2023-03-24 NOTE — TELEPHONE ENCOUNTER
Labs  Route to [facility] calling in Patient   If someone other than patient is calling, are they listed on the communication consent? N/A   Name of ordering Doctor Kike , Sergio Gunn   Call back number [phone]   Date of appointment 4/5/23   Patient requesting Clarification needed  Are labs needed for upcoming appointment   Lab draw location [facility]   Pt lost paper scripts   Advised pt the [facility]

## 2023-03-29 ENCOUNTER — TELEPHONE (OUTPATIENT)
Age: 53
End: 2023-03-29

## 2023-03-29 NOTE — TELEPHONE ENCOUNTER
03/29/23    LVM reminding pt for updated labs prior to the next appt  Advising pt to get blood work done in any [facility] labs  [facility] number also provided

## 2023-04-03 ENCOUNTER — TELEPHONE (OUTPATIENT)
Dept: HEMATOLOGY ONCOLOGY | Facility: CLINIC | Age: 53
End: 2023-04-03

## 2023-04-03 NOTE — TELEPHONE ENCOUNTER
04/03/23    LVM reminding labs again  Pt was also advised to r/s her appt if she is unable to complete labs before her appt

## 2023-04-04 LAB
ALBUMIN SERPL-MCNC: 5.1 G/DL (ref 3.8–4.9)
ALBUMIN/GLOB SERPL: 2.2 {RATIO} (ref 1.2–2.2)
ALP SERPL-CCNC: 90 IU/L (ref 44–121)
ALT SERPL-CCNC: 21 IU/L (ref 0–32)
AST SERPL-CCNC: 21 IU/L (ref 0–40)
BASOPHILS # BLD AUTO: 0.1 X10E3/UL (ref 0–0.2)
BASOPHILS NFR BLD AUTO: 1 %
BILIRUB SERPL-MCNC: 0.4 MG/DL (ref 0–1.2)
BUN SERPL-MCNC: 13 MG/DL (ref 6–24)
BUN/CREAT SERPL: 17 (ref 9–23)
CALCIUM SERPL-MCNC: 9.9 MG/DL (ref 8.7–10.2)
CHLORIDE SERPL-SCNC: 106 MMOL/L (ref 96–106)
CO2 SERPL-SCNC: 24 MMOL/L (ref 20–29)
CREAT SERPL-MCNC: 0.78 MG/DL (ref 0.57–1)
EGFR: 91 ML/MIN/1.73
EOSINOPHIL # BLD AUTO: 1.2 X10E3/UL (ref 0–0.4)
EOSINOPHIL NFR BLD AUTO: 16 %
ERYTHROCYTE [DISTWIDTH] IN BLOOD BY AUTOMATED COUNT: 15.7 % (ref 11.7–15.4)
GLOBULIN SER-MCNC: 2.3 G/DL (ref 1.5–4.5)
GLUCOSE SERPL-MCNC: 97 MG/DL (ref 70–99)
HCT VFR BLD AUTO: 38.2 % (ref 34–46.6)
HGB BLD-MCNC: 12.1 G/DL (ref 11.1–15.9)
IMM GRANULOCYTES # BLD: 0 X10E3/UL (ref 0–0.1)
IMM GRANULOCYTES NFR BLD: 0 %
LYMPHOCYTES # BLD AUTO: 3.3 X10E3/UL (ref 0.7–3.1)
LYMPHOCYTES NFR BLD AUTO: 45 %
MCH RBC QN AUTO: 20.6 PG (ref 26.6–33)
MCHC RBC AUTO-ENTMCNC: 31.7 G/DL (ref 31.5–35.7)
MCV RBC AUTO: 65 FL (ref 79–97)
METHYLMALONATE SERPL-SCNC: 208 NMOL/L (ref 0–378)
MONOCYTES # BLD AUTO: 0.4 X10E3/UL (ref 0.1–0.9)
MONOCYTES NFR BLD AUTO: 5 %
NEUTROPHILS # BLD AUTO: 2.5 X10E3/UL (ref 1.4–7)
NEUTROPHILS NFR BLD AUTO: 33 %
PLATELET # BLD AUTO: 422 X10E3/UL (ref 150–450)
POTASSIUM SERPL-SCNC: 4.3 MMOL/L (ref 3.5–5.2)
PROT SERPL-MCNC: 7.4 G/DL (ref 6–8.5)
RBC # BLD AUTO: 5.87 X10E6/UL (ref 3.77–5.28)
SODIUM SERPL-SCNC: 145 MMOL/L (ref 134–144)
WBC # BLD AUTO: 7.4 X10E3/UL (ref 3.4–10.8)

## 2023-04-05 ENCOUNTER — TELEPHONE (OUTPATIENT)
Dept: HEMATOLOGY ONCOLOGY | Facility: CLINIC | Age: 53
End: 2023-04-05

## 2023-04-05 NOTE — TELEPHONE ENCOUNTER
Appointment Cancellation or Reschedule     Person calling In self   If someone other than patient calling, are they listed on the communication consent form? self   Provider Rosana Mehta   Office Visit Date and Time 4/5 10:30am   Office Visit Location [facility]   Did patient want to reschedule their visit? If so, when was it scheduled to? Yes, 4/7 8:30am   Did the patient have STAR scheduled for this appointment? no   Does the patient need STAR scheduled for their new appointment? no   Is this patient calling to reschedule an infusion appointment? no   When is their next infusion appointment? no   Is this patient a Chemo patient? no   Reason for Cancellation or Reschedule  disappeared   Was the No show policy reviewed with patient if patient is canceling within 24 hours? yes     If the patient is cancelling an appointment and needs their STAR Transport cancelled, please route to [facility]  If the patient is a treatment patient, please route this to the office nurse  If the patient is not on treatment, please route to the Clerical pool based on location  If the patient is a surgical oncology patient, please route to surg/onc clinical pool  Route message as high priority if same day cancellation

## 2023-04-05 NOTE — PROGRESS NOTES
HEMATOLOGY / [address] FOLLOW UP NOTE    Primary Care Provider: Howie Galindo DO  Referring Provider:    MRN: [id number]  : 1970    Reason for Encounter: Follow-up visit for iron deficiency anemia, thalassemia minor trait  Interval History: Patient returns for a follow-up visit  She was last seen in 2022 and set up to receive IV Venofer and B12 replacement due to symptomatic iron deficiency anemia  She was also recommended to undergo routine screening colonoscopy  She completed iron infusions on 2023  Most recent blood work completed on 3/30/23 reviewed  She is no longer anemic  Her hemoglobin improved from 9 8-12  1  MCV remains low at 65 consistent with her history of thalassemia trait  Platelet count has normalized  CMP unremarkable  MMA normal   Iron panel was not drawn but given the fact she is no longer anemic I suspect her serum iron has also improved  Patient feels markedly improved  Her symptoms have resolved  Denies any concerns today    REVIEW OF SYSTEMS:  Please note that a 14-point review of systems was performed to include Constitutional, HEENT, Respiratory, CVS, GI, , Musculoskeletal, Integumentary, Neurologic, Rheumatologic, Endocrinologic, Psychiatric, Lymphatic, and Hematologic/Oncologic systems were reviewed and are negative unless otherwise stated in HPI  Positive and negative findings pertinent to this evaluation are incorporated into the history of present illness  PROBLEM LIST:  Patient Active Problem List   Diagnosis   • RSD (reflex sympathetic dystrophy)   • Chronic pain syndrome   • Thalassemia minor   • History of gastric bypass   • History of tobacco use   • Anxiety   • Family history of premature CAD   • Iron deficiency   • Continuous opioid dependence ([address] )   • Impaired fasting glucose   • Mixed hyperlipidemia   • Mood 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       Assessment / Plan:  1   Iron deficiency    2  History of gastric bypass    3  Thalassemia minor      Patient has a history of gastric bypass and iron deficiency anemia likely secondary to postsurgical malabsorption  She has been treated with IV iron multiple times over the years most recently earlier this year  Her most recent blood work has normalized  She feels well  She has low MCV secondary to history of thalassemia  She will stay on observation  I will see her back in 6 months with repeat blood work  She knows to call at anytime with questions or recurrent symptoms and we can check labs sooner  Patient is in agreement with this plan of care  I spent 20 minutes on chart review, face to face counseling time, coordination of care and documentation  Past Medical History:   has a past medical history of Allergic, Anemia, Anxiety, Depression, Eating disorder, Iron deficiency, Migraine, Obesity, RSD (reflex sympathetic dystrophy), Thalassemia minor, and TIA (transient ischemic attack)  PAST SURGICAL HISTORY:   has a past surgical history that includes Gastric bypass (N/A, 2005); Tonsillectomy;  section; and Cholecystectomy ()  CURRENT MEDICATIONS  Current Outpatient Medications   Medication Sig Dispense Refill   • ARIPiprazole (ABILIFY) 10 mg tablet Take 1 PO Q HS 90 tablet 1   • busPIRone (BUSPAR) 10 mg tablet Take 1 PO  tablet 1   • cetirizine (ZyrTEC) 10 mg tablet Take 10 mg by mouth daily     • DULoxetine (CYMBALTA) 60 mg delayed release capsule Take 1 PO QD 90 capsule 1   • morphine 20 MG/5ML solution Take 20 mg by mouth 3 (three) times a day     • ondansetron (ZOFRAN) 8 mg tablet Take 8 mg by mouth every 8 (eight) hours as needed for nausea or vomiting     • transdermal buprenorphine (BUTRANS) 20 mcg/hr PTWK TD patch Place 1 patch on the skin once a week     • traZODone (DESYREL) 100 mg tablet Take 1 to 1 1/2 PO HS  tablet 1     No current facility-administered medications for this visit  "    [unfilled]    SOCIAL HISTORY:   reports that she quit smoking about 13 years ago  Her smoking use included cigarettes  She started smoking about 41 years ago  She has a 25 00 pack-year smoking history  She has never used smokeless tobacco  She reports that she does not currently use alcohol  She reports current drug use  Drug: Marijuana  FAMILY HISTORY:  family history includes ADD / ADHD in her mother; Alcohol abuse in her brother; Anxiety disorder in her mother; Asthma in her daughter; Autoimmune disease in her daughter; Cancer in her father; Completed Suicide  in her brother; Depression in her mother; Diabetes in her mother; Drug abuse in her brother; Heart disease in her father; Mental illness in her mother; Psychiatric Illness in her brother; Schizophrenia in her brother; Substance Abuse in her brother and son  ALLERGIES:  is allergic to baclofen and imitrex [sumatriptan]  Physical Exam:  Vital Signs:   Visit Vitals  /70 (BP Location: Left arm, Patient Position: Sitting, Cuff Size: Standard)   Pulse 73   Temp 98 4 °F (36 9 °C) (Temporal)   Resp 16   Ht 5' 8\" (1 727 m)   Wt 81 6 kg (180 lb)   SpO2 99%   BMI 27 37 kg/m²   OB Status Postmenopausal   Smoking Status Former   BSA 1 95 m²     Body mass index is 27 37 kg/m²  Body surface area is 1 95 meters squared  Physical Exam  Constitutional:       General: She is not in acute distress  Appearance: Normal appearance  HENT:      Head: Normocephalic and atraumatic  Eyes:      General: No scleral icterus  Right eye: No discharge  Left eye: No discharge  Conjunctiva/sclera: Conjunctivae normal    Cardiovascular:      Rate and Rhythm: Normal rate and regular rhythm  Pulmonary:      Effort: Pulmonary effort is normal  No respiratory distress  Breath sounds: Normal breath sounds  Abdominal:      General: Bowel sounds are normal  There is no distension  Palpations: Abdomen is soft  There is no mass        " Tenderness: There is no abdominal tenderness  Musculoskeletal:         General: Normal range of motion  Lymphadenopathy:      Cervical: No cervical adenopathy  Upper Body:      Right upper body: No supraclavicular, axillary or pectoral adenopathy  Left upper body: No supraclavicular, axillary or pectoral adenopathy  Skin:     General: Skin is warm and dry  Neurological:      General: No focal deficit present  Mental Status: She is alert and oriented to person, place, and time     Psychiatric:         Mood and Affect: Mood normal          Behavior: Behavior normal          Labs:  Lab Results   Component Value Date    WBC 7 4 03/30/2023    HGB 12 1 03/30/2023    HCT 38 2 03/30/2023    MCV 65 (L) 03/30/2023     03/30/2023     Lab Results   Component Value Date    SODIUM 145 (H) 03/30/2023    K 4 3 03/30/2023     03/30/2023    CO2 24 03/30/2023    BUN 13 03/30/2023    CREATININE 0 78 03/30/2023    GLUC 97 03/30/2023    AST 21 03/30/2023    ALT 21 03/30/2023    TP 7 4 03/30/2023    TBILI 0 4 03/30/2023    EGFR 91 03/30/2023

## 2023-04-07 ENCOUNTER — OFFICE VISIT (OUTPATIENT)
Age: 53
End: 2023-04-07

## 2023-04-07 VITALS
BODY MASS INDEX: 27.28 KG/M2 | RESPIRATION RATE: 16 BRPM | TEMPERATURE: 98.4 F | SYSTOLIC BLOOD PRESSURE: 122 MMHG | DIASTOLIC BLOOD PRESSURE: 70 MMHG | HEIGHT: 68 IN | OXYGEN SATURATION: 99 % | WEIGHT: 180 LBS | HEART RATE: 73 BPM

## 2023-04-07 DIAGNOSIS — D56.3 THALASSEMIA MINOR: ICD-10-CM

## 2023-04-07 DIAGNOSIS — E61.1 IRON DEFICIENCY: Primary | ICD-10-CM

## 2023-04-07 DIAGNOSIS — Z98.84 HISTORY OF GASTRIC BYPASS: ICD-10-CM

## 2023-06-01 ENCOUNTER — HOSPITAL ENCOUNTER (OUTPATIENT)
Dept: MAMMOGRAPHY | Facility: IMAGING CENTER | Age: 53
Discharge: HOME | End: 2023-06-01

## 2023-06-01 VITALS — WEIGHT: 178 LBS | BODY MASS INDEX: 26.98 KG/M2 | HEIGHT: 68 IN

## 2023-06-01 DIAGNOSIS — Z12.31 ENCOUNTER FOR SCREENING MAMMOGRAM FOR BREAST CANCER: ICD-10-CM

## 2023-06-06 ENCOUNTER — TELEPHONE (OUTPATIENT)
Dept: FAMILY MEDICINE CLINIC | Facility: CLINIC | Age: 53
End: 2023-06-06

## 2023-06-06 DIAGNOSIS — Z12.11 COLON CANCER SCREENING: Primary | ICD-10-CM

## 2023-06-06 NOTE — TELEPHONE ENCOUNTER
Advice Only -    I spoke with the patient  Patient requests an order for Cologuard rather than Colonoscopy, if Dr Siobhan Goldman is agreeable  Pt stated that she has no hx of colon cancer in the family  Updates - pt completed Mammo on 6/1/2023 and is scheduled for PAP on 6/16/2023  Please review and advise regarding Cologuard

## 2023-06-07 NOTE — TELEPHONE ENCOUNTER
Of course  Since no personal history of polyp and  No family history of colon cancer, cologuard is certainly a good alternative option for colon cancer screen  Order placed

## 2023-06-09 ENCOUNTER — TELEPHONE (OUTPATIENT)
Dept: PSYCHIATRY | Facility: CLINIC | Age: 53
End: 2023-06-09

## 2023-06-09 NOTE — TELEPHONE ENCOUNTER
Contacted patient about cancelling 6/28/2023 appt with Amanda Galdamez due to him retiring  Asked client to call [phone] to get refill if needed  We are in the process of bringing new providers into the practice  Once we have schedules loaded for the new providers, we will reach out to get you scheduled

## 2023-06-13 ENCOUNTER — DOCUMENTATION (OUTPATIENT)
Dept: PSYCHIATRY | Facility: CLINIC | Age: 53
End: 2023-06-13

## 2023-06-13 NOTE — PSYCH
[facility]    Patient Name Adelina Meeks     Date of Birth: 47 y o  1970      MRN: [id number]    Admission Date: several years ago    Date of Transfer: June 13, 2023    Admission Diagnosis:     Major Depressive Disorder    Current Diagnosis:     No diagnosis found  Reason for Admission: Nubia Brown presented for treatment due to depression  Primary complaints included DEPRESSIVE SYMPTOMS: unremarkable - euthymic mood  Progress in Treatment: Nubia Brown was seen for Medication Management  During the course of treatment she      Episodes of Higher Level of Care: No    Transfer request Initiated by: Psychiatrist: None Therapist: None    Reason for Transfer Request: clinician leaving practice    Does this individual need a clinician with specialized training/expertise?: No    Is this client working with any other [facility] Providers/Therapists?  Psychiatrist: Nurse Practitioner Ashley Moser Therapist: None    Other pertinent issues: None    Are there any specific individuals who would be a “best fit” or who have already agreed to accept this transfer request?      Psychiatrist: None   Therapist: None  Rationale: Not Applicable    Attempts to maintain the current therapeutic relationship: Not Applicable    Transfer request routed to Clinical Coordinator for input and/or approval      Comments from other involved providers and/or clinical coordinator: None    VENKAT ChanNP06/13/23

## 2023-06-16 ENCOUNTER — ANNUAL EXAM (OUTPATIENT)
Dept: FAMILY MEDICINE CLINIC | Facility: CLINIC | Age: 53
End: 2023-06-16

## 2023-06-16 VITALS
WEIGHT: 182 LBS | RESPIRATION RATE: 18 BRPM | SYSTOLIC BLOOD PRESSURE: 133 MMHG | HEIGHT: 68 IN | HEART RATE: 90 BPM | TEMPERATURE: 97 F | OXYGEN SATURATION: 98 % | BODY MASS INDEX: 27.58 KG/M2 | DIASTOLIC BLOOD PRESSURE: 60 MMHG

## 2023-06-16 DIAGNOSIS — Z01.419 ENCOUNTER FOR WELL WOMAN EXAM WITH ROUTINE GYNECOLOGICAL EXAM: Primary | ICD-10-CM

## 2023-06-16 DIAGNOSIS — E61.1 IRON DEFICIENCY: ICD-10-CM

## 2023-06-16 DIAGNOSIS — Z12.4 CERVICAL CANCER SCREENING: ICD-10-CM

## 2023-06-16 DIAGNOSIS — D56.3 THALASSEMIA MINOR: ICD-10-CM

## 2023-06-16 DIAGNOSIS — N94.819 VULVODYNIA: ICD-10-CM

## 2023-06-16 RX ORDER — TIZANIDINE HYDROCHLORIDE 4 MG/1
4 CAPSULE, GELATIN COATED ORAL EVERY 8 HOURS
COMMUNITY
Start: 2023-06-09

## 2023-06-16 NOTE — PROGRESS NOTES
Name: Zack Pace      : 1970      MRN: [id number]  Encounter Provider: Paulina Tucker DO  Encounter Date: 2023   Encounter department: [address] [place] [address]     1  Encounter for well woman exam with routine gynecological exam  Patient overdue for lung cancer screen  Order placed previously and she was encouraged to schedule  She is overdue for colon cancer screen  Prefers cologuard and has order but has not received kit in mail yet  She has orders for screening labs that were placed in September of last year  She agrees to complete  Order reprinted today  2  Cervical cancer screening  -     IGP, Aptima HPV, Rfx 16/18,45  Thin prep pap sent  Mammogram up to date  Discussed diet/exercise, calcium and vitamin D      3  BMI 27 0-27 9,adult    4  Vulvodynia  -     Ambulatory Referral to Physical Therapy; Future  Refer pelvic floor therapy  5  Iron deficiency  Patient saw hematology and completed iron infusions  Hemoglobin came up significantly  Hematology had placed order for labs to f/u on this  6  Thalassemia minor      BMI Counseling: Body mass index is 27 67 kg/m²  The BMI is above normal  Nutrition recommendations include encouraging healthy choices of fruits and vegetables and increasing intake of lean protein  Exercise recommendations include exercising 3-5 times per week  No pharmacotherapy was ordered  Rationale for BMI follow-up plan is due to patient being overweight or obese  Depression Screening and Follow-up Plan: Patient was screened for depression during today's encounter  They screened negative with a PHQ-2 score of 0  Subjective     HPI   Patient is a 47year old female with impaired fasting glucose, hyperlipidemia, iron deficiency, thalassemia minor, depression, anxiety and RSD here today for pap and pelvic exam      She saw hematology in December regarding her iron deficiency and thalassemia minor   Was set up for iron infusions which she completed 1/17/23  She had f/u with hematology in April and was doing well  Hemoglobin came up from 9 8 to 12 1 with her infusions  She continues to follow with neurology for her RSD and is on butrans and morphine  She follows with psychiatry (Dr Ami Mathur) for her depression and anxiety  On cymbalta, abilify, buspar and trazodone  She is up to date on mammogram (6/1/23)  She is overdue for colon cancer screening (was referred by me at visit on 9/27/22)  Nothing scheduled  Called here on 6/7 requesting cologuard instead  Order placed  Overdue for lung cancer screening (was given rx 9/27/22 but never had completed)  ? veena  Has outstanding lab orders that were given in September for f/u on her sugar and cholesterol  Her last pap was done approximately 9 years ago  No history of abnormal paps  Review of Systems    Past Medical History:   Diagnosis Date   • Allergic    • Anemia    • Anxiety    • Depression    • Eating disorder     prior to her gastric bypass was an over-eater   • Iron deficiency    • Migraine    • Obesity    • RSD (reflex sympathetic dystrophy)    • Thalassemia minor    • TIA (transient ischemic attack)     reaction to baclofen? ?     Past Surgical History:   Procedure Laterality Date   • [address], 1996   • CHOLECYSTECTOMY  1995   • GASTRIC BYPASS N/A 09/21/2005   • TONSILLECTOMY       Family History   Problem Relation Age of Onset   • Mental illness Mother    • Depression Mother    • Diabetes Mother         Actually this is moms mom   • ADD / ADHD Mother    • Anxiety disorder Mother    • Heart disease Father    • Cancer Father 36        Oral cancer   • Asthma Daughter    • Autoimmune disease Daughter    • No Known Problems Maternal Grandmother    • No Known Problems Maternal Grandfather    • No Known Problems Paternal Grandmother    • No Known Problems Paternal Grandfather    • Alcohol abuse Brother    • Completed Suicide  Brother    • "Substance Abuse Brother    • Drug abuse Brother    • Psychiatric Illness Brother    • Schizophrenia Brother    • No Known Problems Brother    • Substance Abuse Son      Social History     Socioeconomic History   • Marital status: Single     Spouse name: None   • Number of children: None   • Years of education: None   • Highest education level: None   Occupational History   • None   Tobacco Use   • Smoking status: Former     Packs/day: 1 00     Years: 25 00     Total pack years: 25 00     Types: Cigarettes     Start date: 1982     Quit date: 2009     Years since quittin 1   • Smokeless tobacco: Never   Vaping Use   • Vaping Use: Never used   Substance and Sexual Activity   • Alcohol use: Not Currently   • Drug use: Yes     Types: Marijuana     Comment: medical marijuana   • Sexual activity: Not Currently     Partners: Male     Birth control/protection: Condom Male   Other Topics Concern   • None   Social History Narrative        2 children who are recovering addicts  Daughter lives in [place] and son lives with her    Is a \"Universal \"     Social Determinants of Health     Financial Resource Strain: Low Risk  (2022)    Overall Financial Resource Strain (CARDIA)    • Difficulty of Paying Living Expenses: Not hard at all   Food Insecurity: Not on file   Transportation Needs: Unmet Transportation Needs (2022)    PRAPARE - Transportation    • Lack of Transportation (Medical):  Yes    • Lack of Transportation (Non-Medical): Yes   Physical Activity: Not on file   Stress: Not on file   Social Connections: Not on file   Intimate Partner Violence: Not on file   Housing Stability: Not on file     Current Outpatient Medications on File Prior to Visit   Medication Sig   • ARIPiprazole (ABILIFY) 10 mg tablet Take 1 PO Q HS   • busPIRone (BUSPAR) 10 mg tablet Take 1 PO TID   • cetirizine (ZyrTEC) 10 mg tablet Take 10 mg by mouth daily   • DULoxetine (CYMBALTA) 60 mg delayed release capsule " "Take 1 PO QD   • morphine 20 MG/5ML solution Take 20 mg by mouth 3 (three) times a day   • ondansetron (ZOFRAN) 8 mg tablet Take 8 mg by mouth every 8 (eight) hours as needed for nausea or vomiting   • TiZANidine (ZANAFLEX) 4 MG capsule Take 4 mg by mouth every 8 (eight) hours   • transdermal buprenorphine (BUTRANS) 20 mcg/hr PTWK TD patch Place 1 patch on the skin once a week   • traZODone (DESYREL) 100 mg tablet Take 1 to 1 1/2 PO HS PRN     Allergies   Allergen Reactions   • Baclofen Confusion     TIA   • Imitrex [Sumatriptan] Chest Pain     Immunization History   Administered Date(s) Administered   • Influenza, recombinant, quadrivalent,injectable, preservative free 09/27/2022       Objective     /60 (BP Location: Left arm, Patient Position: Sitting, Cuff Size: Adult)   Pulse 90   Temp (!) 97 °F (36 1 °C) (Tympanic)   Resp 18   Ht 5' 8\" (1 727 m)   Wt 82 6 kg (182 lb)   SpO2 98%   BMI 27 67 kg/m²     Physical Exam  Vitals and nursing note reviewed  Exam conducted with a chaperone present  Constitutional:       General: She is not in acute distress  Appearance: Normal appearance  She is not ill-appearing, toxic-appearing or diaphoretic  HENT:      Head: Normocephalic  Mouth/Throat:      Mouth: Mucous membranes are moist    Eyes:      Extraocular Movements: Extraocular movements intact  Conjunctiva/sclera: Conjunctivae normal       Pupils: Pupils are equal, round, and reactive to light  Cardiovascular:      Rate and Rhythm: Normal rate and regular rhythm  Heart sounds: No murmur heard  Pulmonary:      Effort: Pulmonary effort is normal       Breath sounds: Normal breath sounds  Chest:   Breasts:     Right: Normal  No swelling, bleeding, inverted nipple, mass, nipple discharge, skin change or tenderness  Left: Normal  No swelling, bleeding, inverted nipple, mass, nipple discharge, skin change or tenderness  Genitourinary:     Labia:         Right: No rash or lesion     " Left: No rash or lesion  Vagina: Normal  No vaginal discharge or bleeding  Cervix: No friability or erythema  Uterus: Normal        Adnexa: Right adnexa normal and left adnexa normal       Rectum: Normal  Guaiac result negative  Comments: Cervical os stenotic  Musculoskeletal:      Cervical back: Normal range of motion and neck supple  Right lower leg: No edema  Left lower leg: No edema  Lymphadenopathy:      Cervical: No cervical adenopathy  Upper Body:      Right upper body: No supraclavicular, axillary or pectoral adenopathy  Left upper body: No supraclavicular, axillary or pectoral adenopathy  Neurological:      General: No focal deficit present  Mental Status: She is alert and oriented to person, place, and time     Psychiatric:         Mood and Affect: Mood normal        Elsa Odom DO

## 2023-06-19 LAB
CYTOLOGIST CVX/VAG CYTO: NORMAL
DX ICD CODE: NORMAL
HPV GENOTYPE REFLEX: NORMAL
HPV I/H RISK 4 DNA CVX QL PROBE+SIG AMP: NEGATIVE
OTHER STN SPEC: NORMAL
PATH REPORT.FINAL DX SPEC: NORMAL
SL AMB NOTE:: NORMAL
SL AMB SPECIMEN ADEQUACY: NORMAL
SL AMB TEST METHODOLOGY: NORMAL

## 2023-08-31 ENCOUNTER — TELEPHONE (OUTPATIENT)
Dept: PSYCHIATRY | Facility: CLINIC | Age: 53
End: 2023-08-31

## 2023-09-07 DIAGNOSIS — F06.31 MOOD DISORDER DUE TO KNOWN PHYSIOLOGICAL CONDITION WITH DEPRESSIVE FEATURES: ICD-10-CM

## 2023-09-07 RX ORDER — DULOXETIN HYDROCHLORIDE 60 MG/1
CAPSULE, DELAYED RELEASE ORAL
Qty: 30 CAPSULE | Refills: 1 | Status: SHIPPED | OUTPATIENT
Start: 2023-09-07

## 2023-09-07 NOTE — TELEPHONE ENCOUNTER
Pt [address] [facility], 1970 , [facility] chart was reviewed.  Duloxetine 60mg was sent to [facility]    This note was not shared with the patient due to reasonable likelihood of causing patient harm

## 2023-09-07 NOTE — TELEPHONE ENCOUNTER
Patient calling for RF of duloxetine. Scheduled for appt with Dr. Herminio Camp 10/12. Needs bridge script.

## 2023-09-08 ENCOUNTER — OFFICE VISIT (OUTPATIENT)
Dept: FAMILY MEDICINE CLINIC | Facility: CLINIC | Age: 53
End: 2023-09-08
Payer: COMMERCIAL

## 2023-09-08 VITALS
HEIGHT: 68 IN | SYSTOLIC BLOOD PRESSURE: 128 MMHG | WEIGHT: 190 LBS | BODY MASS INDEX: 28.79 KG/M2 | DIASTOLIC BLOOD PRESSURE: 61 MMHG | HEART RATE: 61 BPM | OXYGEN SATURATION: 99 % | TEMPERATURE: 97.3 F

## 2023-09-08 DIAGNOSIS — R60.0 LOWER EXTREMITY EDEMA: ICD-10-CM

## 2023-09-08 DIAGNOSIS — F11.20 CONTINUOUS OPIOID DEPENDENCE (HCC): ICD-10-CM

## 2023-09-08 DIAGNOSIS — E78.2 MIXED HYPERLIPIDEMIA: Primary | ICD-10-CM

## 2023-09-08 DIAGNOSIS — R73.01 IMPAIRED FASTING GLUCOSE: ICD-10-CM

## 2023-09-08 PROCEDURE — 99213 OFFICE O/P EST LOW 20 MIN: CPT | Performed by: FAMILY MEDICINE

## 2023-09-08 RX ORDER — METHYLPREDNISOLONE 4 MG/1
4 TABLET ORAL AS NEEDED
COMMUNITY
Start: 2023-08-04 | End: 2023-09-08

## 2023-09-08 RX ORDER — BUTALBITAL, ACETAMINOPHEN AND CAFFEINE 50; 325; 40 MG/1; MG/1; MG/1
50 TABLET ORAL
COMMUNITY
Start: 2023-08-12

## 2023-09-08 RX ORDER — CEFDINIR 300 MG/1
300 CAPSULE ORAL EVERY 12 HOURS
COMMUNITY
Start: 2023-08-04 | End: 2023-09-08

## 2023-09-08 NOTE — PROGRESS NOTES
Name: Jana Reid      : 1970      MRN: [id number]  Encounter Provider: Da Dietrich DO  Encounter Date: 2023   Encounter department: [address] [place] [address]     1. Mixed hyperlipidemia  -     Lipid panel; Future  -     Lipid panel  Lab Results   Component Value Date    CHOLESTEROL 233 (H) 2022     Lab Results   Component Value Date    HDL 58 2022     Lab Results   Component Value Date    TRIG 220 (H) 2022     No results found for: "[address]"  Due for repeat lipid panel  2. Impaired fasting glucose  -     Comprehensive metabolic panel; Future  -     HEMOGLOBIN A1C W/ EAG ESTIMATION; Future  -     Comprehensive metabolic panel  -     HEMOGLOBIN A1C W/ EAG ESTIMATION  Check cmp and A1c  3. Lower extremity edema  -     TSH, 3rd generation with Free T4 reflex; Future  -     Echo complete w/ contrast if indicated; Future; Expected date: 2023  -     TSH, 3rd generation with Free T4 reflex  Bilateral   Etiology unclear  ? Venous stasis vs secondary to low albumin vs secondary to hypothyroidism vs cardiac  Will check labs and echo  Compression stockings may be helpful to reduce lower extremity swelling  She is interested in trying a diuretic. If labs normal, would consider giving a short course of diuretic with close f/u     4. Continuous opioid dependence ([address])  On morphine as prescribed by her neurologist.          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 States that weight is more than it has ever been since her gastric bypass. Noticed over the summer months that legs seemed more swollen than usual. At times feels like she has swelling in hands but nothing today. She took photo of the swelling to show me.  She states that swelling does not go down when she sleeps. No shortness of breath, chest pain or palpitations. No pain in feet or ankles. No redness or warmth in legs. Post menopausal. Is active and gets 15 K steps per day most days. She has not completed colon cancer screen and lung cancer screen as ordered at previous visit. Reminded patient. Was sick during august with what she thinks was sinus infection. Treated with cefdinir and steroids at an urgent care in [place]. Review of Systems    Past Medical History:   Diagnosis Date   • Allergic    • Anemia    • Anxiety    • Depression    • Eating disorder     prior to her gastric bypass was an over-eater   • Iron deficiency    • Migraine    • Obesity    • RSD (reflex sympathetic dystrophy)    • Thalassemia minor    • TIA (transient ischemic attack)     reaction to baclofen? ?     Past Surgical History:   Procedure Laterality Date   • [place], 1996   • CHOLECYSTECTOMY  1995   • GASTRIC BYPASS N/A 09/21/2005   • TONSILLECTOMY       Family History   Problem Relation Age of Onset   • Mental illness Mother    • Depression Mother    • Diabetes Mother         Actually this is moms mom   • ADD / ADHD Mother    • Anxiety disorder Mother    • Heart disease Father    • Cancer Father 36        Oral cancer   • Asthma Daughter    • Autoimmune disease Daughter    • No Known Problems Maternal Grandmother    • No Known Problems Maternal Grandfather    • No Known Problems Paternal Grandmother    • No Known Problems Paternal Grandfather    • Alcohol abuse Brother    • Completed Suicide  Brother    • Substance Abuse Brother    • Drug abuse Brother    • Psychiatric Illness Brother    • Schizophrenia Brother    • No Known Problems Brother    • Substance Abuse Son      Social History     Socioeconomic History   • Marital status: Single     Spouse name: None   • Number of children: None   • Years of education: None   • Highest education level: None Occupational History   • None   Tobacco Use   • Smoking status: Former     Packs/day: 1.00     Years: 25.00     Total pack years: 25.00     Types: Cigarettes     Start date: 1982     Quit date: 2009     Years since quittin.3   • Smokeless tobacco: Never   Vaping Use   • Vaping Use: Never used   Substance and Sexual Activity   • Alcohol use: Not Currently   • Drug use: Yes     Types: Marijuana     Comment: medical marijuana   • Sexual activity: Not Currently     Partners: Male     Birth control/protection: Condom Male   Other Topics Concern   • None   Social History Narrative        2 children who are recovering addicts. Daughter lives in [facility] and son lives with her    Is a "Universal "     Social Determinants of Health     Financial Resource Strain: Low Risk  (2022)    Overall Financial Resource Strain (CARDIA)    • Difficulty of Paying Living Expenses: Not hard at all   Food Insecurity: Not on file   Transportation Needs: Unmet Transportation Needs (2022)    PRAPARE - Transportation    • Lack of Transportation (Medical):  Yes    • Lack of Transportation (Non-Medical): Yes   Physical Activity: Not on file   Stress: Not on file   Social Connections: Not on file   Intimate Partner Violence: Not on file   Housing Stability: Not on file     Current Outpatient Medications on File Prior to Visit   Medication Sig   • ARIPiprazole (ABILIFY) 10 mg tablet Take 1 PO Q HS   • busPIRone (BUSPAR) 10 mg tablet Take 1 PO TID   • butalbital-acetaminophen-caffeine (FIORICET,ESGIC) -40 mg per tablet Take 50 tablets by mouth every 28 days   • cetirizine (ZyrTEC) 10 mg tablet Take 10 mg by mouth daily   • DULoxetine (CYMBALTA) 60 mg delayed release capsule Duloxetine 60m capsule in the morning   • morphine 20 MG/5ML solution Take 20 mg by mouth 3 (three) times a day   • ondansetron (ZOFRAN) 8 mg tablet Take 8 mg by mouth every 8 (eight) hours as needed for nausea or vomiting   • TiZANidine (ZANAFLEX) 4 MG capsule Take 4 mg by mouth every 8 (eight) hours   • transdermal buprenorphine (BUTRANS) 20 mcg/hr PTWK TD patch Place 1 patch on the skin once a week   • traZODone (DESYREL) 100 mg tablet Take 1 to 1 1/2 PO HS PRN   • [DISCONTINUED] cefdinir (OMNICEF) 300 mg capsule Take 300 capsules by mouth every 12 (twelve) hours   • [DISCONTINUED] methylPREDNISolone 4 MG tablet therapy pack Take 4 mg by mouth if needed     Allergies   Allergen Reactions   • Baclofen Confusion     TIA   • Imitrex [Sumatriptan] Chest Pain     Immunization History   Administered Date(s) Administered   • Influenza, recombinant, quadrivalent,injectable, preservative free 09/27/2022       Objective     /61 (BP Location: Left arm, Patient Position: Sitting, Cuff Size: Standard)   Pulse 61   Temp (!) 97.3 °F (36.3 °C) (Tympanic)   Ht 5' 8" (1.727 m)   Wt 86.2 kg (190 lb)   SpO2 99%   BMI 28.89 kg/m²     Physical Exam  Vitals and nursing note reviewed. Constitutional:       General: She is not in acute distress. Appearance: Normal appearance. She is not ill-appearing, toxic-appearing or diaphoretic. HENT:      Head: Normocephalic and atraumatic. Eyes:      Conjunctiva/sclera: Conjunctivae normal.   Cardiovascular:      Rate and Rhythm: Normal rate and regular rhythm. Heart sounds: No murmur heard. Pulmonary:      Effort: Pulmonary effort is normal.      Breath sounds: Normal breath sounds. Abdominal:      General: Abdomen is flat. Bowel sounds are normal. There is no distension. Palpations: Abdomen is soft. There is no mass. Tenderness: There is no abdominal tenderness. Hernia: No hernia is present. Comments: No inguinal adenopathy   Musculoskeletal:      Cervical back: Normal range of motion and neck supple. Comments: Has 1 + edema in bilateral medial ankles today. No swelling or tenderness in calves    Lymphadenopathy:      Cervical: No cervical adenopathy. Neurological:      General: No focal deficit present. Mental Status: She is alert and oriented to person, place, and time.       Deep Tendon Reflexes: Reflexes normal.   Psychiatric:         Mood and Affect: Mood normal.       Murtaza Call, DO

## 2023-09-15 LAB
ALBUMIN SERPL-MCNC: 4.6 G/DL (ref 3.8–4.9)
ALBUMIN/GLOB SERPL: 2 {RATIO} (ref 1.2–2.2)
ALP SERPL-CCNC: 97 IU/L (ref 44–121)
ALT SERPL-CCNC: 29 IU/L (ref 0–32)
AST SERPL-CCNC: 30 IU/L (ref 0–40)
BILIRUB SERPL-MCNC: 0.3 MG/DL (ref 0–1.2)
BUN SERPL-MCNC: 11 MG/DL (ref 6–24)
BUN/CREAT SERPL: 15 (ref 9–23)
CALCIUM SERPL-MCNC: 9.4 MG/DL (ref 8.7–10.2)
CHLORIDE SERPL-SCNC: 104 MMOL/L (ref 96–106)
CHOLEST SERPL-MCNC: 225 MG/DL (ref 100–199)
CHOLEST/HDLC SERPL: 3.2 RATIO (ref 0–4.4)
CO2 SERPL-SCNC: 21 MMOL/L (ref 20–29)
CREAT SERPL-MCNC: 0.73 MG/DL (ref 0.57–1)
EGFR: 98 ML/MIN/1.73
EST. AVERAGE GLUCOSE BLD GHB EST-MCNC: 105 MG/DL
GLOBULIN SER-MCNC: 2.3 G/DL (ref 1.5–4.5)
GLUCOSE SERPL-MCNC: 96 MG/DL (ref 70–99)
HBA1C MFR BLD: 5.3 % (ref 4.8–5.6)
HDLC SERPL-MCNC: 71 MG/DL
LDLC SERPL CALC-MCNC: 132 MG/DL (ref 0–99)
POTASSIUM SERPL-SCNC: 3.8 MMOL/L (ref 3.5–5.2)
PROT SERPL-MCNC: 6.9 G/DL (ref 6–8.5)
SL AMB VLDL CHOLESTEROL CALC: 22 MG/DL (ref 5–40)
SODIUM SERPL-SCNC: 139 MMOL/L (ref 134–144)
TRIGL SERPL-MCNC: 124 MG/DL (ref 0–149)
TSH SERPL DL<=0.005 MIU/L-ACNC: 1.66 UIU/ML (ref 0.45–4.5)

## 2023-09-29 ENCOUNTER — HOSPITAL ENCOUNTER (OUTPATIENT)
Dept: NON INVASIVE DIAGNOSTICS | Age: 53
Discharge: HOME | End: 2023-09-29
Payer: COMMERCIAL

## 2023-09-29 VITALS
HEART RATE: 80 BPM | DIASTOLIC BLOOD PRESSURE: 61 MMHG | WEIGHT: 190 LBS | SYSTOLIC BLOOD PRESSURE: 128 MMHG | BODY MASS INDEX: 28.79 KG/M2 | HEIGHT: 68 IN

## 2023-09-29 DIAGNOSIS — R60.0 LOWER EXTREMITY EDEMA: ICD-10-CM

## 2023-09-29 LAB
AORTIC ROOT: 3 CM
AORTIC VALVE MEAN VELOCITY: 8.3 M/S
APICAL FOUR CHAMBER EJECTION FRACTION: 59 %
AV LVOT MEAN GRADIENT: 2 MMHG
AV LVOT PEAK GRADIENT: 3 MMHG
AV MEAN GRADIENT: 3 MMHG
AV PEAK GRADIENT: 5 MMHG
AV VELOCITY RATIO: 0.8
DOP CALC AO PEAK VEL: 1.09 M/S
DOP CALC AO VTI: 25.05 CM
DOP CALC LVOT PEAK VEL VTI: 19.67 CM
DOP CALC LVOT PEAK VEL: 0.87 M/S
DOP CALC MV VTI: 17.92 CM
E WAVE DECELERATION TIME: 199 MS
FRACTIONAL SHORTENING: 33 (ref 28–44)
GLOBAL LONGITUIDAL STRAIN: -18 %
INTERVENTRICULAR SEPTUM IN DIASTOLE (PARASTERNAL SHORT AXIS VIEW): 1 CM
INTERVENTRICULAR SEPTUM: 1 CM (ref 0.6–1.1)
LAAS-AP2: 14.7 CM2
LAAS-AP4: 12.4 CM2
LEFT ATRIUM SIZE: 3.6 CM
LEFT ATRIUM VOLUME (MOD BIPLANE): 38 ML
LEFT INTERNAL DIMENSION IN SYSTOLE: 2.2 CM (ref 2.1–4)
LEFT VENTRICLE DIASTOLIC VOLUME (MOD BIPLANE): 106 ML
LEFT VENTRICLE SYSTOLIC VOLUME (MOD BIPLANE): 44 ML
LEFT VENTRICULAR INTERNAL DIMENSION IN DIASTOLE: 3.3 CM (ref 3.5–6)
LEFT VENTRICULAR POSTERIOR WALL IN END DIASTOLE: 1 CM
LEFT VENTRICULAR STROKE VOLUME: 27 ML
LV EF: 59 %
LVSV (TEICH): 27 ML
MV E'TISSUE VEL-LAT: 10 CM/S
MV E'TISSUE VEL-SEP: 9 CM/S
MV MEAN GRADIENT: 1 MMHG
MV PEAK A VEL: 0.73 M/S
MV PEAK E VEL: 64 CM/S
MV PEAK GRADIENT: 2 MMHG
MV STENOSIS PRESSURE HALF TIME: 58 MS
MV VALVE AREA P 1/2 METHOD: 3.79
RIGHT ATRIAL 2D VOLUME: 31 ML
RIGHT ATRIUM AREA SYSTOLE A4C: 12.6 CM2
RIGHT VENTRICLE ID DIMENSION: 4 CM
SL CV LEFT ATRIUM LENGTH A2C: 4.2 CM
SL CV LV EF: 65
SL CV PED ECHO LEFT VENTRICLE DIASTOLIC VOLUME (MOD BIPLANE) 2D: 44 ML
SL CV PED ECHO LEFT VENTRICLE SYSTOLIC VOLUME (MOD BIPLANE) 2D: 17 ML
TRICUSPID ANNULAR PLANE SYSTOLIC EXCURSION: 2.1 CM

## 2023-09-29 PROCEDURE — 93306 TTE W/DOPPLER COMPLETE: CPT | Performed by: INTERNAL MEDICINE

## 2023-09-29 PROCEDURE — 93306 TTE W/DOPPLER COMPLETE: CPT

## 2023-10-04 ENCOUNTER — TELEPHONE (OUTPATIENT)
Age: 53
End: 2023-10-04

## 2023-10-05 ENCOUNTER — TELEPHONE (OUTPATIENT)
Dept: HEMATOLOGY ONCOLOGY | Facility: CLINIC | Age: 53
End: 2023-10-05

## 2023-10-05 LAB
FERRITIN SERPL-MCNC: 101 NG/ML (ref 15–150)
IRON SATN MFR SERPL: 42 % (ref 15–55)
IRON SERPL-MCNC: 160 UG/DL (ref 27–159)
TIBC SERPL-MCNC: 385 UG/DL (ref 250–450)
UIBC SERPL-MCNC: 225 UG/DL (ref 131–425)

## 2023-10-05 NOTE — TELEPHONE ENCOUNTER
Appointment Change  Cancel, Reschedule, Change to Virtual      Who are you speaking with? Patient   If it is not the patient, is the caller listed on the communication consent form? N/A   Which provider is the appointment scheduled with? MABLE Lester   When was the original appointment scheduled? Please list date and time 10/6/23 1130   At which location is the appointment scheduled to take place? [place]   Was the appointment rescheduled? Was the appointment changed from an in person visit to a virtual visit? If so, please list the details of the change. n/a   What is the reason for the appointment change? Tested pos for covid       Was [facility] transport scheduled? N/A   Does [facility] transport need to be scheduled for the new visit (if applicable) No   Does the patient need an infusion appointment rescheduled? N/A   Does the patient have an upcoming infusion appointment scheduled? If so, when? No   Is the patient undergoing chemotherapy? No   For appointments cancelled with less than 24 hours:  Was the no-show policy reviewed?  N/A

## 2023-10-12 ENCOUNTER — TELEMEDICINE (OUTPATIENT)
Dept: PSYCHIATRY | Facility: CLINIC | Age: 53
End: 2023-10-12
Payer: COMMERCIAL

## 2023-10-12 DIAGNOSIS — F06.31 MOOD DISORDER DUE TO KNOWN PHYSIOLOGICAL CONDITION WITH DEPRESSIVE FEATURES: ICD-10-CM

## 2023-10-12 PROCEDURE — 90792 PSYCH DIAG EVAL W/MED SRVCS: CPT | Performed by: STUDENT IN AN ORGANIZED HEALTH CARE EDUCATION/TRAINING PROGRAM
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
## 2023-10-14 RX ORDER — BUSPIRONE HYDROCHLORIDE 10 MG/1
TABLET ORAL
Qty: 90 TABLET | Refills: 3 | Status: SHIPPED | OUTPATIENT
Start: 2023-10-14 | End: 2023-10-17 | Stop reason: SDUPTHER

## 2023-10-14 RX ORDER — TRAZODONE HYDROCHLORIDE 100 MG/1
TABLET ORAL
Qty: 30 TABLET | Refills: 2 | Status: SHIPPED | OUTPATIENT
Start: 2023-10-14 | End: 2023-10-17 | Stop reason: SDUPTHER

## 2023-10-14 RX ORDER — DULOXETIN HYDROCHLORIDE 60 MG/1
CAPSULE, DELAYED RELEASE ORAL
Qty: 30 CAPSULE | Refills: 1 | Status: SHIPPED | OUTPATIENT
Start: 2023-10-14 | End: 2023-10-17 | Stop reason: SDUPTHER

## 2023-10-14 RX ORDER — ARIPIPRAZOLE 10 MG/1
TABLET ORAL
Qty: 30 TABLET | Refills: 3 | Status: SHIPPED | OUTPATIENT
Start: 2023-10-14 | End: 2023-10-17 | Stop reason: SDUPTHER

## 2023-10-17 RX ORDER — DULOXETIN HYDROCHLORIDE 60 MG/1
CAPSULE, DELAYED RELEASE ORAL
Qty: 30 CAPSULE | Refills: 3 | Status: SHIPPED | OUTPATIENT
Start: 2023-10-17

## 2023-10-17 RX ORDER — ARIPIPRAZOLE 10 MG/1
TABLET ORAL
Qty: 30 TABLET | Refills: 3 | Status: SHIPPED | OUTPATIENT
Start: 2023-10-17

## 2023-10-17 RX ORDER — TRAZODONE HYDROCHLORIDE 100 MG/1
TABLET ORAL
Qty: 30 TABLET | Refills: 2 | Status: SHIPPED | OUTPATIENT
Start: 2023-10-17

## 2023-10-17 RX ORDER — BUSPIRONE HYDROCHLORIDE 10 MG/1
TABLET ORAL
Qty: 90 TABLET | Refills: 3 | Status: SHIPPED | OUTPATIENT
Start: 2023-10-17

## 2023-11-01 ENCOUNTER — TELEPHONE (OUTPATIENT)
Dept: FAMILY MEDICINE CLINIC | Facility: CLINIC | Age: 53
End: 2023-11-01

## 2023-11-19 LAB — COLOGUARD RESULT REPORTABLE: POSITIVE

## 2023-11-20 ENCOUNTER — TELEPHONE (OUTPATIENT)
Dept: FAMILY MEDICINE CLINIC | Facility: CLINIC | Age: 53
End: 2023-11-20

## 2023-11-20 DIAGNOSIS — R19.5 POSITIVE COLORECTAL CANCER SCREENING USING COLOGUARD TEST: Primary | ICD-10-CM

## 2023-11-20 NOTE — TELEPHONE ENCOUNTER
----- Message from Da Dietrich DO sent at 11/20/2023 11:56 AM EST -----  Can let patient know that her cologuard was positive. Will need to see GI for colonoscopy. Referral placed. Jose Harper [facility]  [facility] Pharmacists [facility]; [facility] Nurse Msg Pool  Hello,     We received the authorization information for this patient for Harvoni but have not received a prescription with correct directions yet. If the patient is starting this medication, please send a new prescription to [facility].     Thank you!   Jose Harper [facility]   Specialty Pharmacy Coordinator   [facility]   [phone]

## 2023-11-27 ENCOUNTER — OFFICE VISIT (OUTPATIENT)
Dept: FAMILY MEDICINE CLINIC | Facility: CLINIC | Age: 53
End: 2023-11-27
Payer: COMMERCIAL

## 2023-11-27 VITALS
HEIGHT: 66 IN | DIASTOLIC BLOOD PRESSURE: 80 MMHG | RESPIRATION RATE: 18 BRPM | OXYGEN SATURATION: 100 % | WEIGHT: 189 LBS | BODY MASS INDEX: 30.37 KG/M2 | TEMPERATURE: 97.1 F | SYSTOLIC BLOOD PRESSURE: 120 MMHG | HEART RATE: 96 BPM

## 2023-11-27 DIAGNOSIS — F33.9 DEPRESSION, RECURRENT (HCC): ICD-10-CM

## 2023-11-27 DIAGNOSIS — R19.5 POSITIVE COLORECTAL CANCER SCREENING USING COLOGUARD TEST: ICD-10-CM

## 2023-11-27 DIAGNOSIS — Z87.891 HISTORY OF TOBACCO USE: ICD-10-CM

## 2023-11-27 DIAGNOSIS — Z23 ENCOUNTER FOR IMMUNIZATION: ICD-10-CM

## 2023-11-27 DIAGNOSIS — F17.211 CIGARETTE NICOTINE DEPENDENCE IN REMISSION: ICD-10-CM

## 2023-11-27 DIAGNOSIS — F11.20 CONTINUOUS OPIOID DEPENDENCE (HCC): ICD-10-CM

## 2023-11-27 DIAGNOSIS — G89.4 CHRONIC PAIN SYNDROME: ICD-10-CM

## 2023-11-27 DIAGNOSIS — Z11.4 SCREENING FOR HIV (HUMAN IMMUNODEFICIENCY VIRUS): ICD-10-CM

## 2023-11-27 DIAGNOSIS — G43.009 MIGRAINE WITHOUT AURA AND WITHOUT STATUS MIGRAINOSUS, NOT INTRACTABLE: ICD-10-CM

## 2023-11-27 DIAGNOSIS — Z00.00 MEDICARE ANNUAL WELLNESS VISIT, SUBSEQUENT: Primary | ICD-10-CM

## 2023-11-27 DIAGNOSIS — Z11.59 NEED FOR HEPATITIS C SCREENING TEST: ICD-10-CM

## 2023-11-27 DIAGNOSIS — R73.01 IMPAIRED FASTING GLUCOSE: ICD-10-CM

## 2023-11-27 DIAGNOSIS — E61.1 IRON DEFICIENCY: ICD-10-CM

## 2023-11-27 DIAGNOSIS — G90.50 RSD (REFLEX SYMPATHETIC DYSTROPHY): ICD-10-CM

## 2023-11-27 DIAGNOSIS — E78.2 MIXED HYPERLIPIDEMIA: ICD-10-CM

## 2023-11-27 DIAGNOSIS — Z98.84 HISTORY OF GASTRIC BYPASS: ICD-10-CM

## 2023-11-27 PROCEDURE — 90686 IIV4 VACC NO PRSV 0.5 ML IM: CPT

## 2023-11-27 PROCEDURE — G0008 ADMIN INFLUENZA VIRUS VAC: HCPCS

## 2023-11-27 PROCEDURE — G0439 PPPS, SUBSEQ VISIT: HCPCS | Performed by: FAMILY MEDICINE

## 2023-11-27 PROCEDURE — 99214 OFFICE O/P EST MOD 30 MIN: CPT | Performed by: FAMILY MEDICINE

## 2023-11-27 RX ORDER — TOPIRAMATE 25 MG/1
25 TABLET ORAL 2 TIMES DAILY
COMMUNITY
Start: 2023-11-14

## 2023-11-27 NOTE — PROGRESS NOTES
Assessment and Plan:     Problem List Items Addressed This Visit        Endocrine    Impaired fasting glucose    Relevant Orders    Comprehensive metabolic panel    HEMOGLOBIN A1C W/ EAG ESTIMATION       Cardiovascular and Mediastinum    Migraine without aura    Relevant Medications    topiramate (TOPAMAX) 25 mg tablet       Nervous and Auditory    RSD (reflex sympathetic dystrophy)       Other    Chronic pain syndrome    History of gastric bypass    History of tobacco use    Iron deficiency    Continuous opioid dependence (HCC)    Mixed hyperlipidemia    Relevant Orders    Lipid panel    Positive colorectal cancer screening using Cologuard test    Depression, recurrent ([address])   Other Visit Diagnoses     Medicare annual wellness visit, subsequent    -  Primary    Cigarette nicotine dependence in remission        Relevant Orders    CT lung screening program    Need for hepatitis C screening test        Relevant Orders    Hepatitis C Antibody    Screening for HIV (human immunodeficiency virus)        Relevant Orders    HIV 1/2 Antigen/Antibody (Fourth Generation) with Reflex Testing ([facility], [facility], or EXTERNAL LAB)    BMI 30.0-30.9,adult        Encounter for immunization        Relevant Orders    influenza vaccine, quadrivalent, 0.5 mL, preservative-free, for adult and pediatric patients 6 mos+ (AFLURIA, FLUARIX, FLULAVAL, FLUZONE) (Completed)            Depression Screening and Follow-up Plan: Patient was screened for depression during today's encounter. They screened negative with a PHQ-2 score of 0. Preventive health issues were discussed with patient, and age appropriate screening tests were ordered as noted in patient's After Visit Summary. Personalized health advice and appropriate referrals for health education or preventive services given if needed, as noted in patient's After Visit Summary.      History of Present Illness:     Patient presents for a Medicare Wellness Visit    HPI   Patient Care Team:  Les Kovacs DO as PCP - General (Family Medicine)     Review of Systems:     Review of Systems     Problem List:     Patient Active Problem List   Diagnosis   • RSD (reflex sympathetic dystrophy)   • Chronic pain syndrome   • Thalassemia minor   • History of gastric bypass   • History of tobacco use   • Anxiety   • Family history of premature CAD   • Iron deficiency   • Continuous opioid dependence ([address])   • Impaired fasting glucose   • Mixed hyperlipidemia   • Mood 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   • Positive colorectal cancer screening using Cologuard test   • Migraine without aura   • Depression, recurrent ([address])      Past Medical and Surgical History:     Past Medical History:   Diagnosis Date   • Allergic    • Anemia    • Anxiety    • Depression    • Eating disorder     prior to her gastric bypass was an over-eater   • Iron deficiency    • Migraine    • Obesity    • RSD (reflex sympathetic dystrophy)    • Thalassemia minor    • TIA (transient ischemic attack)     reaction to baclofen? ?     Past Surgical History:   Procedure Laterality Date   • [place], 1996   • CHOLECYSTECTOMY  1995   • GASTRIC BYPASS N/A 09/21/2005   • TONSILLECTOMY        Family History:     Family History   Problem Relation Age of Onset   • Mental illness Mother    • Depression Mother    • Diabetes Mother         Actually this is moms mom   • ADD / ADHD Mother    • Anxiety disorder Mother    • Heart disease Father    • Cancer Father 36        Oral cancer   • Asthma Daughter    • Autoimmune disease Daughter    • No Known Problems Maternal Grandmother    • No Known Problems Maternal Grandfather    • No Known Problems Paternal Grandmother    • No Known Problems Paternal Grandfather    • Alcohol abuse Brother    • Completed Suicide  Brother    • Substance Abuse Brother    • Drug abuse Brother    • Psychiatric Illness Brother    • Schizophrenia Brother    • No Known Problems Brother    • Substance Abuse Son       Social History:     Social History     Socioeconomic History   • Marital status: Single     Spouse name: None   • Number of children: None   • Years of education: None   • Highest education level: None   Occupational History   • None   Tobacco Use   • Smoking status: Former     Packs/day: 1.00     Years: 25.00     Total pack years: 25.00     Types: Cigarettes     Start date: 1982     Quit date: 2009     Years since quittin.5   • Smokeless tobacco: Never   Vaping Use   • Vaping Use: Never used   Substance and Sexual Activity   • Alcohol use: Not Currently   • Drug use: Yes     Types: Marijuana     Comment: medical marijuana   • Sexual activity: Not Currently     Partners: Male     Birth control/protection: Condom Male   Other Topics Concern   • None   Social History Narrative        2 children who are recovering addicts. Daughter lives in [facility] and son lives with her    Is a "Universal "     Social Determinants of Health     Financial Resource Strain: Low Risk  (2022)    Overall Financial Resource Strain (CARDIA)    • Difficulty of Paying Living Expenses: Not hard at all   Food Insecurity: Not on file   Transportation Needs: Unmet Transportation Needs (2022)    PRAPARE - Transportation    • Lack of Transportation (Medical):  Yes    • Lack of Transportation (Non-Medical): Yes   Physical Activity: Not on file   Stress: Not on file   Social Connections: Not on file   Intimate Partner Violence: Not on file   Housing Stability: Not on file      Medications and Allergies:     Current Outpatient Medications   Medication Sig Dispense Refill   • ARIPiprazole (ABILIFY) 10 mg tablet Take 1 PO Q HS 30 tablet 3   • busPIRone (BUSPAR) 10 mg tablet Take 1 PO TID 90 tablet 3   • butalbital-acetaminophen-caffeine (FIORICET,ESGIC) -40 mg per tablet Take 30 tablets by mouth every 28 days     • cetirizine (ZyrTEC) 10 mg tablet Take 10 mg by mouth daily     • DULoxetine (CYMBALTA) 60 mg delayed release capsule Duloxetine 60m capsule in the morning 30 capsule 3   • morphine 20 MG/5ML solution Take 20 mg by mouth 3 (three) times a day     • ondansetron (ZOFRAN) 8 mg tablet Take 8 mg by mouth every 8 (eight) hours as needed for nausea or vomiting     • TiZANidine (ZANAFLEX) 4 MG capsule Take 4 mg by mouth every 8 (eight) hours     • topiramate (TOPAMAX) 25 mg tablet Take 25 mg by mouth 2 (two) times a day     • transdermal buprenorphine (BUTRANS) 20 mcg/hr PTWK TD patch Place 1 patch on the skin once a week     • traZODone (DESYREL) 100 mg tablet Take 1 to 1 1/2 PO HS PRN 30 tablet 2     No current facility-administered medications for this visit. Allergies   Allergen Reactions   • Baclofen Confusion     TIA   • Coconut Oil - Food Allergy Swelling   • Imitrex [Sumatriptan] Chest Pain      Immunizations:     Immunization History   Administered Date(s) Administered   • Influenza, injectable, quadrivalent, preservative free 0.5 mL 2023   • Influenza, recombinant, quadrivalent,injectable, preservative free 2022      Health Maintenance:         Topic Date Due   • Hepatitis C Screening  Never done   • HIV Screening  Never done   • Lung Cancer Screening  Never done   • Breast Cancer Screening: Mammogram  2024   • Colorectal Cancer Screening  11/10/2026   • Cervical Cancer Screening  2028         Topic Date Due   • COVID-19 Vaccine (1) Never done   • Pneumococcal Vaccine: Pediatrics (0 to 5 Years) and At-Risk Patients (6 to 59 Years) (1 - PCV) Never done   • Hepatitis A Vaccine (1 of 2 - Risk 2-dose series) Never done      Medicare Screening Tests and Risk Assessments:     Nancy Mccormick is here for her Subsequent Wellness visit. Health Risk Assessment:   Patient rates overall health as very good. Patient feels that their physical health rating is same. Patient is satisfied with their life. Eyesight was rated as same.  Hearing was rated as same. Patient feels that their emotional and mental health rating is same. Patients states they are never, rarely angry. Patient states they are always unusually tired/fatigued. Pain experienced in the last 7 days has been a lot. Patient's pain rating has been 6/10. Patient states that she has experienced weight loss or gain in last 6 months. Depression Screening:   PHQ-2 Score: 0  PHQ-9 Score: 0      Fall Risk Screening: In the past year, patient has experienced: no history of falling in past year      Urinary Incontinence Screening:   Patient has not leaked urine accidently in the last six months. Home Safety:  Patient does not have trouble with stairs inside or outside of their home. Patient has working smoke alarms and has working carbon monoxide detector. Home safety hazards include: none. Nutrition:   Current diet is Regular and Low Cholesterol. Medications:   Patient is not currently taking any over-the-counter supplements. Patient is able to manage medications. Activities of Daily Living (ADLs)/Instrumental Activities of Daily Living (IADLs):   Walk and transfer into and out of bed and chair?: No  Dress and groom yourself?: No    Bathe or shower yourself?: No    Feed yourself?  No  Do your laundry/housekeeping?: No  Manage your money, pay your bills and track your expenses?: No  Make your own meals?: No    Do your own shopping?: No    Previous Hospitalizations:   Any hospitalizations or ED visits within the last 12 months?: No      Advance Care Planning:   Living will: No    Durable POA for healthcare: No    Advanced directive: No    Advanced directive counseling given: Yes    ACP document given: Yes      Cognitive Screening:   Provider or family/friend/caregiver concerned regarding cognition?: No    PREVENTIVE SCREENINGS      Cardiovascular Screening:    General: History Lipid Disorder and Screening Current      Diabetes Screening:     General: Screening Current      Colorectal Cancer Screening:     General: Risks and Benefits Discussed    Due for: Colonoscopy - High Risk      Breast Cancer Screening:     General: Screening Current      Cervical Cancer Screening:    General: Screening Current      Osteoporosis Screening:    General: Screening Not Indicated      Lung Cancer Screening:     General: Risks and Benefits Discussed    Due for: Low Dose CT (LDCT)      Hepatitis C Screening:    General: Risks and Benefits Discussed    Hep C Screening Accepted: Yes      Screening, Brief Intervention, and Referral to Treatment (SBIRT)    Screening      Single Item Drug Screening:  How often have you used an illegal drug (including marijuana) or a prescription medication for non-medical reasons in the past year? never    Single Item Drug Screen Score: 0  Interpretation: Negative screen for possible drug use disorder    Brief Intervention  Alcohol & drug use screenings were reviewed. No concerns regarding substance use disorder identified. Review of Current Opioid Use    Opioid Risk Tool (ORT) Interpretation: Complete Opioid Risk Tool (ORT)    Other Counseling Topics:   Calcium and vitamin D intake and regular weightbearing exercise. No results found.      Physical Exam:     /80 (BP Location: Left arm, Patient Position: Sitting, Cuff Size: Standard)   Pulse 96   Temp (!) 97.1 °F (36.2 °C) (Tympanic)   Resp 18   Ht 5' 6" (1.676 m)   Wt 85.7 kg (189 lb)   SpO2 100%   BMI 30.51 kg/m²     Physical Exam     Sandi Dean DO

## 2023-11-27 NOTE — PATIENT INSTRUCTIONS
Medicare Preventive Visit Patient Instructions  Thank you for completing your Welcome to Medicare Visit or Medicare Annual Wellness Visit today. Your next wellness visit will be due in one year (11/27/2024). The screening/preventive services that you may require over the next 5-10 years are detailed below. Some tests may not apply to you based off risk factors and/or age. Screening tests ordered at today's visit but not completed yet may show as past due. Also, please note that scanned in results may not display below. Preventive Screenings:  Service Recommendations Previous Testing/Comments   Colorectal Cancer Screening  * Colonoscopy    * Fecal Occult Blood Test (FOBT)/Fecal Immunochemical Test (FIT)  * Fecal DNA/Cologuard Test  * Flexible Sigmoidoscopy Age: 43-73 years old   Colonoscopy: every 10 years (may be performed more frequently if at higher risk)  OR  FOBT/FIT: every 1 year  OR  Cologuard: every 3 years  OR  Sigmoidoscopy: every 5 years  Screening may be recommended earlier than age 39 if at higher risk for colorectal cancer. Also, an individualized decision between you and your healthcare provider will decide whether screening between the ages of 77-80 would be appropriate. Colonoscopy: Not on file  FOBT/FIT: Not on file  Cologuard: 11/10/2023  Sigmoidoscopy: Not on file          Breast Cancer Screening Age: 36 years old  Frequency: every 1-2 years  Not required if history of left and right mastectomy Mammogram: 06/01/2023        Cervical Cancer Screening Between the ages of 21-29, pap smear recommended once every 3 years. Between the ages of 32-69, can perform pap smear with HPV co-testing every 5 years.    Recommendations may differ for women with a history of total hysterectomy, cervical cancer, or abnormal pap smears in past. Pap Smear: 06/16/2023        Hepatitis C Screening Once for adults born between 1945 and 1965  More frequently in patients at high risk for Hepatitis C Hep C Antibody: Not on file        Diabetes Screening 1-2 times per year if you're at risk for diabetes or have pre-diabetes Fasting glucose: No results in last 5 years (No results in last 5 years)  A1C: 5.3 % (9/14/2023)      Cholesterol Screening Once every 5 years if you don't have a lipid disorder. May order more often based on risk factors. Lipid panel: 09/14/2023          Other Preventive Screenings Covered by Medicare:  Abdominal Aortic Aneurysm (AAA) Screening: covered once if your at risk. You're considered to be at risk if you have a family history of AAA. Lung Cancer Screening: covers low dose CT scan once per year if you meet all of the following conditions: (1) Age 48-67; (2) No signs or symptoms of lung cancer; (3) Current smoker or have quit smoking within the last 15 years; (4) You have a tobacco smoking history of at least 20 pack years (packs per day multiplied by number of years you smoked); (5) You get a written order from a healthcare provider. Glaucoma Screening: covered annually if you're considered high risk: (1) You have diabetes OR (2) Family history of glaucoma OR (3)  aged 48 and older OR (3)  American aged 72 and older  Osteoporosis Screening: covered every 2 years if you meet one of the following conditions: (1) You're estrogen deficient and at risk for osteoporosis based off medical history and other findings; (2) Have a vertebral abnormality; (3) On glucocorticoid therapy for more than 3 months; (4) Have primary hyperparathyroidism; (5) On osteoporosis medications and need to assess response to drug therapy. Last bone density test (DXA Scan): Not on file. HIV Screening: covered annually if you're between the age of 14-79. Also covered annually if you are younger than 13 and older than 72 with risk factors for HIV infection. For pregnant patients, it is covered up to 3 times per pregnancy.     Immunizations:  Immunization Recommendations   Influenza Vaccine Annual influenza vaccination during flu season is recommended for all persons aged >= 6 months who do not have contraindications   Pneumococcal Vaccine   * Pneumococcal conjugate vaccine = PCV13 (Prevnar 13), PCV15 (Vaxneuvance), PCV20 (Prevnar 20)  * Pneumococcal polysaccharide vaccine = PPSV23 (Pneumovax) Adults 44-51 yo with certain risk factors or if 69+ yo  If never received any pneumonia vaccine: recommend Prevnar 20 (PCV20)  Give PCV20 if previously received 1 dose of PCV13 or PPSV23   Hepatitis B Vaccine 3 dose series if at intermediate or high risk (ex: diabetes, end stage renal disease, liver disease)   Respiratory syncytial virus (RSV) Vaccine - COVERED BY MEDICARE PART D  * RSVPreF3 (Arexvy) CDC recommends that adults 61years of age and older may receive a single dose of RSV vaccine using shared clinical decision-making (SCDM)   Tetanus (Td) Vaccine - COST NOT COVERED BY MEDICARE PART B Following completion of primary series, a booster dose should be given every 10 years to maintain immunity against tetanus. Td may also be given as tetanus wound prophylaxis. Tdap Vaccine - COST NOT COVERED BY MEDICARE PART B Recommended at least once for all adults. For pregnant patients, recommended with each pregnancy.    Shingles Vaccine (Shingrix) - COST NOT COVERED BY MEDICARE PART B  2 shot series recommended in those 19 years and older who have or will have weakened immune systems or those 50 years and older     Health Maintenance Due:      Topic Date Due   • Hepatitis C Screening  Never done   • HIV Screening  Never done   • Lung Cancer Screening  Never done   • Breast Cancer Screening: Mammogram  06/01/2024   • Colorectal Cancer Screening  11/10/2026   • Cervical Cancer Screening  06/16/2028     Immunizations Due:      Topic Date Due   • COVID-19 Vaccine (1) Never done   • Pneumococcal Vaccine: Pediatrics (0 to 5 Years) and At-Risk Patients (6 to 59 Years) (1 - PCV) Never done   • Influenza Vaccine (1) 09/01/2023     Advance Directives   What are advance directives? Advance directives are legal documents that state your wishes and plans for medical care. These plans are made ahead of time in case you lose your ability to make decisions for yourself. Advance directives can apply to any medical decision, such as the treatments you want, and if you want to donate organs. What are the types of advance directives? There are many types of advance directives, and each state has rules about how to use them. You may choose a combination of any of the following:  Living will: This is a written record of the treatment you want. You can also choose which treatments you do not want, which to limit, and which to stop at a certain time. This includes surgery, medicine, IV fluid, and tube feedings. Durable power of  for [facility]): This is a written record that states who you want to make healthcare choices for you when you are unable to make them for yourself. This person, called a proxy, is usually a family member or a friend. You may choose more than 1 proxy. Do not resuscitate (DNR) order:  A DNR order is used in case your heart stops beating or you stop breathing. It is a request not to have certain forms of treatment, such as CPR. A DNR order may be included in other types of advance directives. Medical directive: This covers the care that you want if you are in a coma, near death, or unable to make decisions for yourself. You can list the treatments you want for each condition. Treatment may include pain medicine, surgery, blood transfusions, dialysis, IV or tube feedings, and a ventilator (breathing machine). Values history: This document has questions about your views, beliefs, and how you feel and think about life. This information can help others choose the care that you would choose. Why are advance directives important? An advance directive helps you control your care.  Although spoken wishes may be used, it is better to have your wishes written down. Spoken wishes can be misunderstood, or not followed. Treatments may be given even if you do not want them. An advance directive may make it easier for your family to make difficult choices about your care. Weight Management   Why it is important to manage your weight:  Being overweight increases your risk of health conditions such as heart disease, high blood pressure, type 2 diabetes, and certain types of cancer. It can also increase your risk for osteoarthritis, sleep apnea, and other respiratory problems. Aim for a slow, steady weight loss. Even a small amount of weight loss can lower your risk of health problems. How to lose weight safely:  A safe and healthy way to lose weight is to eat fewer calories and get regular exercise. You can lose up about 1 pound a week by decreasing the number of calories you eat by 500 calories each day. Healthy meal plan for weight management:  A healthy meal plan includes a variety of foods, contains fewer calories, and helps you stay healthy. A healthy meal plan includes the following:  Eat whole-grain foods more often. A healthy meal plan should contain fiber. Fiber is the part of grains, fruits, and vegetables that is not broken down by your body. Whole-grain foods are healthy and provide extra fiber in your diet. Some examples of whole-grain foods are whole-wheat breads and pastas, oatmeal, brown rice, and bulgur. Eat a variety of vegetables every day. Include dark, leafy greens such as spinach, kale, fili greens, and mustard greens. Eat yellow and orange vegetables such as carrots, sweet potatoes, and winter squash. Eat a variety of fruits every day. Choose fresh or canned fruit (canned in its own juice or light syrup) instead of juice. Fruit juice has very little or no fiber. Eat low-fat dairy foods. Drink fat-free (skim) milk or 1% milk. Eat fat-free yogurt and low-fat cottage cheese.  Try low-fat cheeses such as mozzarella and other reduced-fat cheeses. Choose meat and other protein foods that are low in fat. Choose beans or other legumes such as split peas or lentils. Choose fish, skinless poultry (chicken or turkey), or lean cuts of red meat (beef or pork). Before you cook meat or poultry, cut off any visible fat. Use less fat and oil. Try baking foods instead of frying them. Add less fat, such as margarine, sour cream, regular salad dressing and mayonnaise to foods. Eat fewer high-fat foods. Some examples of high-fat foods include french fries, doughnuts, ice cream, and cakes. Eat fewer sweets. Limit foods and drinks that are high in sugar. This includes candy, cookies, regular soda, and sweetened drinks. Exercise:  Exercise at least 30 minutes per day on most days of the week. Some examples of exercise include walking, biking, dancing, and swimming. You can also fit in more physical activity by taking the stairs instead of the elevator or parking farther away from stores. Ask your healthcare provider about the best exercise plan for you. © Copyright [employer] 2018 Information is for End User's use only and may not be sold, redistributed or otherwise used for commercial purposes.  All illustrations and images included in CareNotes® are the copyrighted property of [employer] or [address] [place] [address]

## 2023-11-27 NOTE — PROGRESS NOTES
Name: Danae Penny      : 1970      MRN: [id number]  Encounter Provider: Sandi Dean DO  Encounter Date: 2023   Encounter department: [address] [place] [address]     1. Medicare annual wellness visit, subsequent  See separate note  2. Cigarette nicotine dependence in remission  -     CT lung screening program; Future; Expected date: 2023  Patient with 25 pack year history of tobacco. Quit 14 years ago. Agreeable to lung cancer screen with low dose CT scan  3. Need for hepatitis C screening test  -     Hepatitis C Antibody; Future  -     Hepatitis C Antibody  Patient agreeable to hep c screen. Ordered today  4. Screening for HIV (human immunodeficiency virus)  -     HIV 1/2 Antigen/Antibody (Fourth Generation) with Reflex Testing ([facility], [facility], or EXTERNAL LAB); Future    5. Impaired fasting glucose  -     Comprehensive metabolic panel; Future; Expected date: 03/15/2024  -     HEMOGLOBIN A1C W/ EAG ESTIMATION; Future; Expected date: 03/15/2024  -     Comprehensive metabolic panel  -     HEMOGLOBIN A1C W/ EAG ESTIMATION  Lab Results   Component Value Date    HGBA1C 5.3 2023       6. Iron deficiency  Iron was 160 on 10/4/23. Patient follows with hematology and receives iron infusions. 7. Mixed hyperlipidemia  -     Lipid panel;  Future; Expected date: 03/15/2024  -     Lipid panel  Lab Results   Component Value Date    CHOLESTEROL 225 (H) 2023    CHOLESTEROL 233 (H) 2022     Lab Results   Component Value Date    HDL 71 2023    HDL 58 2022     Lab Results   Component Value Date    TRIG 124 2023    TRIG 220 (H) 2022     No results found for: "[address]"  The 10-year ASCVD risk score (Shannon STAFFORD, et al., 2019) is: 1.4%    Values used to calculate the score:      Age: 46 years      Sex: Female      Is Non- : No      Diabetic: No      Tobacco smoker: No      Systolic Blood Pressure: 351 mmHg Is BP treated: No      HDL Cholesterol: 71 mg/dL      Total Cholesterol: 225 mg/dL    Diet and exercise discussed. 8. Positive colorectal cancer screening using Cologuard test  Patient had positive cologuard and was referred to GI. She has not scheduled but does plan to do so. 9. Chronic pain syndrome  Follows with pain management. 10. Migraine without aura and without status migrainosus, not intractable  Recently saw neurology at [place] and is back on topamax. 11. Depression, recurrent ([address])  Follows with psychiatry at [facility]. 12. BMI 30.0-30.9,adult    13. Encounter for immunization  -     influenza vaccine, quadrivalent, 0.5 mL, preservative-free, for adult and pediatric patients 6 mos+ (AFLURIA, FLUARIX, FLULAVAL, FLUZONE)    14. RSD (reflex sympathetic dystrophy)    15. History of tobacco use    16. History of gastric bypass    17. Continuous opioid dependence ([address])  For her RSD. Managed by pain management      Depression Screening and Follow-up Plan: Patient was screened for depression during today's encounter. They screened negative with a PHQ-2 score of 0. Subjective     HPI  Patient is a 46year old female with impaired fasting glucose, hyperlipidemia, history gastric bypass, iron deficiency, chronic pain syndrome, RSD here today for annual medicare wellness exam (see separate note) and for f/u on these chronic problems.    Patient Active Problem List   Diagnosis   • RSD (reflex sympathetic dystrophy)   • Chronic pain syndrome   • Thalassemia minor   • History of gastric bypass   • History of tobacco use   • Anxiety   • Family history of premature CAD   • Iron deficiency   • Continuous opioid dependence ([address])   • Impaired fasting glucose   • Mixed hyperlipidemia   • Mood 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   • Positive colorectal cancer screening using Cologuard test   • Migraine without aura   • Depression, recurrent ([address])       She had cologuard test completed on 11/10/23 which was positive. Referred to GI for colonoscopy. Nothing scheduled with GI. Sees psychiatry for her mood disorder and anxiety. Sees neurology for her migraines. Last visit was earlier this month. Was put back on topamax. Tolerating okay thus far. When she took this struggled with some word finding at higher doses (300 mg bid)  She follows with pain management (Dr. Oz Dowell) for her RSD. Had visit with me in September at which time she was complaining of some lower extremity edema. Labs and echo were unremarkable. Recommended compression stockings. States that she never got compression stockings. Today, legs are good. States that the swelling comes and goes. Will occur for a few days at a time when she has not been eating much protein in diet. Due for lung cancer screening. Quit smoking in 2009. Has 25 pack year history of smoking  Mammogram is up to date ( 6/1/23)  Walking for exercise. Goes to the park. Review of Systems    Past Medical History:   Diagnosis Date   • Allergic    • Anemia    • Anxiety    • Depression    • Eating disorder     prior to her gastric bypass was an over-eater   • Iron deficiency    • Migraine    • Obesity    • RSD (reflex sympathetic dystrophy)    • Thalassemia minor    • TIA (transient ischemic attack)     reaction to baclofen? ?     Past Surgical History:   Procedure Laterality Date   • [place], 1996   • CHOLECYSTECTOMY  1995   • GASTRIC BYPASS N/A 09/21/2005   • TONSILLECTOMY       Family History   Problem Relation Age of Onset   • Mental illness Mother    • Depression Mother    • Diabetes Mother         Actually this is moms mom   • ADD / ADHD Mother    • Anxiety disorder Mother    • Heart disease Father    • Cancer Father 36        Oral cancer   • Asthma Daughter    • Autoimmune disease Daughter    • No Known Problems Maternal Grandmother    • No Known Problems Maternal Grandfather    • No Known Problems Paternal Grandmother    • No Known Problems Paternal Grandfather    • Alcohol abuse Brother    • Completed Suicide  Brother    • Substance Abuse Brother    • Drug abuse Brother    • Psychiatric Illness Brother    • Schizophrenia Brother    • No Known Problems Brother    • Substance Abuse Son      Social History     Socioeconomic History   • Marital status: Single     Spouse name: None   • Number of children: None   • Years of education: None   • Highest education level: None   Occupational History   • None   Tobacco Use   • Smoking status: Former     Packs/day: 1.00     Years: 25.00     Total pack years: 25.00     Types: Cigarettes     Start date: 1982     Quit date: 2009     Years since quittin.5   • Smokeless tobacco: Never   Vaping Use   • Vaping Use: Never used   Substance and Sexual Activity   • Alcohol use: Not Currently   • Drug use: Yes     Types: Marijuana     Comment: medical marijuana   • Sexual activity: Not Currently     Partners: Male     Birth control/protection: Condom Male   Other Topics Concern   • None   Social History Narrative        2 children who are recovering addicts. Daughter lives in [facility] and son lives with her    Is a "Universal "     Social Determinants of Health     Financial Resource Strain: Low Risk  (2022)    Overall Financial Resource Strain (CARDIA)    • Difficulty of Paying Living Expenses: Not hard at all   Food Insecurity: Not on file   Transportation Needs: Unmet Transportation Needs (2022)    PRAPARE - Transportation    • Lack of Transportation (Medical):  Yes    • Lack of Transportation (Non-Medical): Yes   Physical Activity: Not on file   Stress: Not on file   Social Connections: Not on file   Intimate Partner Violence: Not on file   Housing Stability: Not on file     Current Outpatient Medications on File Prior to Visit   Medication Sig   • ARIPiprazole (ABILIFY) 10 mg tablet Take 1 PO Q HS   • busPIRone (BUSPAR) 10 mg tablet Take 1 PO TID   • butalbital-acetaminophen-caffeine (FIORICET,ESGIC) -40 mg per tablet Take 30 tablets by mouth every 28 days   • cetirizine (ZyrTEC) 10 mg tablet Take 10 mg by mouth daily   • DULoxetine (CYMBALTA) 60 mg delayed release capsule Duloxetine 60m capsule in the morning   • morphine 20 MG/5ML solution Take 20 mg by mouth 3 (three) times a day   • ondansetron (ZOFRAN) 8 mg tablet Take 8 mg by mouth every 8 (eight) hours as needed for nausea or vomiting   • TiZANidine (ZANAFLEX) 4 MG capsule Take 4 mg by mouth every 8 (eight) hours   • topiramate (TOPAMAX) 25 mg tablet Take 25 mg by mouth 2 (two) times a day   • transdermal buprenorphine (BUTRANS) 20 mcg/hr PTWK TD patch Place 1 patch on the skin once a week   • traZODone (DESYREL) 100 mg tablet Take 1 to 1 1/2 PO HS PRN     Allergies   Allergen Reactions   • Baclofen Confusion     TIA   • Coconut Oil - Food Allergy Swelling   • Imitrex [Sumatriptan] Chest Pain     Immunization History   Administered Date(s) Administered   • Influenza, injectable, quadrivalent, preservative free 0.5 mL 2023   • Influenza, recombinant, quadrivalent,injectable, preservative free 2022       Objective     /80 (BP Location: Left arm, Patient Position: Sitting, Cuff Size: Standard)   Pulse 96   Temp (!) 97.1 °F (36.2 °C) (Tympanic)   Resp 18   Ht 5' 6" (1.676 m)   Wt 85.7 kg (189 lb)   SpO2 100%   BMI 30.51 kg/m²     Physical Exam  Vitals and nursing note reviewed. Constitutional:       General: She is not in acute distress. Appearance: Normal appearance. She is not ill-appearing, toxic-appearing or diaphoretic. HENT:      Head: Normocephalic and atraumatic. Right Ear: Tympanic membrane normal.      Left Ear: Tympanic membrane normal.      Nose: Nose normal.      Mouth/Throat:      Mouth: Mucous membranes are moist.      Pharynx: No oropharyngeal exudate or posterior oropharyngeal erythema.    Eyes:      Extraocular Movements: Extraocular movements intact. Conjunctiva/sclera: Conjunctivae normal.      Pupils: Pupils are equal, round, and reactive to light. Neck:      Comments: No thyromegaly  Cardiovascular:      Rate and Rhythm: Normal rate and regular rhythm. Heart sounds: No murmur heard. Pulmonary:      Effort: Pulmonary effort is normal.      Breath sounds: Normal breath sounds. Abdominal:      General: Abdomen is flat. Bowel sounds are normal.      Palpations: Abdomen is soft. Musculoskeletal:      Cervical back: Normal range of motion and neck supple. Right lower leg: No edema. Left lower leg: No edema. Lymphadenopathy:      Cervical: No cervical adenopathy. Skin:     General: Skin is warm and dry. Findings: No rash. Neurological:      General: No focal deficit present. Mental Status: She is alert and oriented to person, place, and time.       Deep Tendon Reflexes: Reflexes normal.   Psychiatric:         Mood and Affect: Mood normal.   Marjorie Rossi DO

## 2023-12-05 ENCOUNTER — TELEPHONE (OUTPATIENT)
Age: 53
End: 2023-12-05

## 2023-12-05 ENCOUNTER — PREP FOR PROCEDURE (OUTPATIENT)
Age: 53
End: 2023-12-05

## 2023-12-05 DIAGNOSIS — R19.5 POSITIVE COLORECTAL CANCER SCREENING USING COLOGUARD TEST: Primary | ICD-10-CM

## 2023-12-05 NOTE — TELEPHONE ENCOUNTER
12/05/23  Screened by: Hoang Gonzalez    Referring Provider Annemarie Cooks    Pre- Screening: There is no height or weight on file to calculate BMI. Has patient been referred for a routine screening Colonoscopy? yes  Is the patient between 43-73 years old? yes      Previous Colonoscopy no   If yes:    Date:     Facility:     Reason:       SCHEDULING STAFF: If the patient is between 45yrs-49yrs, please advise patient to confirm benefits/coverage with their insurance company for a routine screening colonoscopy, some insurance carriers will only cover at [address] [place] [address] or older. If the patient is over 66years old, please schedule an office visit. Does the patient want to see a Gastroenterologist prior to their procedure OR are they having any GI symptoms? no    Has the patient been hospitalized or had abdominal surgery in the past 6 months? no    Does the patient use supplemental oxygen? no    Does the patient take Coumadin, Lovenox, Plavix, Elliquis, Xarelto, or other blood thinning medication? no    Has the patient had a stroke, cardiac event, or stent placed in the past year? no    PT PASSED OA    SCHEDULING STAFF: If patient answers NO to above questions, then schedule procedure. If patient answers YES to above questions, then schedule office appointment. If patient is between 45yrs - 49yrs, please advise patient that we will have to confirm benefits & coverage with their insurance company for a routine screening colonoscopy.

## 2023-12-05 NOTE — TELEPHONE ENCOUNTER
Scheduled date of colonoscopy (as of today):1/26/24  Physician performing colonoscopy: Dr. Domenico Whipple  Location of colonoscopy:[facility]  Bowel prep reviewed with patient:TANA/ANTONETTE SENT TO [facility]   Instructions reviewed with patient by:BONILLA  Clearances: N/A    Ranjeet Ledezma  [phone]

## 2023-12-28 ENCOUNTER — TELEPHONE (OUTPATIENT)
Dept: FAMILY MEDICINE CLINIC | Facility: CLINIC | Age: 53
End: 2023-12-28

## 2023-12-28 DIAGNOSIS — R52 PAIN: Primary | ICD-10-CM

## 2024-01-11 ENCOUNTER — TELEMEDICINE (OUTPATIENT)
Dept: PSYCHIATRY | Facility: CLINIC | Age: 54
End: 2024-01-11
Payer: COMMERCIAL

## 2024-01-11 DIAGNOSIS — F06.31 MOOD DISORDER DUE TO KNOWN PHYSIOLOGICAL CONDITION WITH DEPRESSIVE FEATURES: ICD-10-CM

## 2024-01-11 PROCEDURE — 99212 OFFICE O/P EST SF 10 MIN: CPT | Performed by: STUDENT IN AN ORGANIZED HEALTH CARE EDUCATION/TRAINING PROGRAM

## 2024-01-12 ENCOUNTER — ANESTHESIA EVENT (OUTPATIENT)
Dept: ANESTHESIOLOGY | Facility: AMBULATORY SURGERY CENTER | Age: 54
End: 2024-01-12

## 2024-01-12 ENCOUNTER — ANESTHESIA (OUTPATIENT)
Dept: ANESTHESIOLOGY | Facility: AMBULATORY SURGERY CENTER | Age: 54
End: 2024-01-12

## 2024-01-16 RX ORDER — BUSPIRONE HYDROCHLORIDE 10 MG/1
TABLET ORAL
Qty: 90 TABLET | Refills: 3 | Status: SHIPPED | OUTPATIENT
Start: 2024-01-16

## 2024-01-16 RX ORDER — DULOXETIN HYDROCHLORIDE 60 MG/1
CAPSULE, DELAYED RELEASE ORAL
Qty: 30 CAPSULE | Refills: 3 | Status: SHIPPED | OUTPATIENT
Start: 2024-01-16

## 2024-01-16 RX ORDER — ARIPIPRAZOLE 10 MG/1
TABLET ORAL
Qty: 30 TABLET | Refills: 3 | Status: SHIPPED | OUTPATIENT
Start: 2024-01-16

## 2024-01-17 NOTE — PSYCH
MEDICATION MANAGEMENT NOTE        [facility]      Name and Date of Birth:  Sanjana Mares 53 y.o. 1970 MRN: [id number]    Date of Visit: January 17, 2024    Reason for Visit: Follow-up visit for medication management     Virtual Visit Disclaimer:       TeleMed provider: Surekha Del Real D.O.    Location: [place]     Verification of patient location:     Patient is currently located in the [facility]  Patient is currently located in a state in which I am licensed     After connecting through [employer], the patient was identified by name and date of birth.  Sanjana Mares was informed that this is a telemedicine visit that is being conducted through [employer], and the patient was informed that this is a secure, HIPAA-compliant platform. My office door was closed. No one else was in the room. Sanjana Mares acknowledged consent and understanding of privacy and security of the video platform. Sanjana understands that the online visit is based solely on information provided by the patient, and that, in the absence of a face-to-face physical evaluation by the physician, the diagnosis Sanjana  receives is both limited and provisional in terms of accuracy and completeness. Sanjana Mares understands that they can discontinue the visit at any time. I informed Sanjana that I have reviewed their record in [facility] and presented the opportunity for them to ask any questions regarding the visit today. Sanjana Mares voiced understanding and consented to these terms. Sanjana is aware this is a billable service. Sanjana is present at her home and primary address      SUBJECTIVE:    Sanjana Mares is a 53 y.o. female with past psychiatric history significant for  who was personally seen and evaluated today at the [facility] outpatient clinic for follow-up and medication management. Sanjana denied SI, HI, AVH, delusions, cosme since her last appointment.  She stated  that she remains engaged with family, friends and personal hobbies and after discussion of risks, benefits, potential side effects, alternatives, patient wishes to continue on current regimen as is without any changes due to reported stability.        Current Rating Scores:     None completed today.    Review Of Systems:      Constitutional negative   ENT negative   Cardiovascular negative   Respiratory negative   Gastrointestinal negative   Genitourinary negative   Musculoskeletal negative   Integumentary negative   Neurological negative   Endocrine negative   Other Symptoms none, all other systems are negative       Past Psychiatric History: (unchanged information from previous note copied and italicized) - Information that is bolded has been updated.     See intake    Substance Abuse History: (unchanged information from previous note copied and italicized) - Information that is bolded has been updated.     See intake    Social History: (unchanged information from previous note copied and italicized) - Information that is bolded has been updated.     See intake    Traumatic History: (unchanged information from previous note copied and italicized) - Information that is bolded has been updated.       See intake    Past Medical History:    Past Medical History:   Diagnosis Date    Allergic     Anemia     Anxiety     Depression     Eating disorder     prior to her gastric bypass was an over-eater    Iron deficiency     Migraine     Obesity     RSD (reflex sympathetic dystrophy)     Thalassemia minor     TIA (transient ischemic attack)     reaction to baclofen??        Past Surgical History:   Procedure Laterality Date     SECTION      ,     CHOLECYSTECTOMY      GASTRIC BYPASS N/A 2005    TONSILLECTOMY       Allergies   Allergen Reactions    Baclofen Confusion     TIA    Coconut Oil - Food Allergy Swelling    Imitrex [Sumatriptan] Chest Pain       Substance Abuse History:    Social History  "    Substance and Sexual Activity   Alcohol Use Not Currently     Social History     Substance and Sexual Activity   Drug Use Yes    Types: Marijuana    Comment: medical marijuana       Social History:    Social History     Socioeconomic History    Marital status: Single     Spouse name: Not on file    Number of children: Not on file    Years of education: Not on file    Highest education level: Not on file   Occupational History    Not on file   Tobacco Use    Smoking status: Former     Current packs/day: 0.00     Average packs/day: 1 pack/day for 27.3 years (27.3 ttl pk-yrs)     Types: Cigarettes     Start date: 1982     Quit date: 2009     Years since quittin.7    Smokeless tobacco: Never   Vaping Use    Vaping status: Never Used   Substance and Sexual Activity    Alcohol use: Not Currently    Drug use: Yes     Types: Marijuana     Comment: medical marijuana    Sexual activity: Not Currently     Partners: Male     Birth control/protection: Condom Male   Other Topics Concern    Not on file   Social History Narrative        2 children who are recovering addicts.     Daughter lives in [place] and son lives with her    Is a \"[employer] \"     Social Determinants of Health     Financial Resource Strain: Low Risk  (2022)    Overall Financial Resource Strain (CARDIA)     Difficulty of Paying Living Expenses: Not hard at all   Food Insecurity: Not on file   Transportation Needs: Unmet Transportation Needs (2022)    PRAPARE - Transportation     Lack of Transportation (Medical): Yes     Lack of Transportation (Non-Medical): Yes   Physical Activity: Not on file   Stress: Not on file   Social Connections: Not on file   Intimate Partner Violence: Not on file   Housing Stability: Not on file       Family Psychiatric History:     Family History   Problem Relation Age of Onset    Mental illness Mother     Depression Mother     Diabetes Mother         Actually this is moms mom    ADD / ADHD " Mother     Anxiety disorder Mother     Heart disease Father     Cancer Father 40        Oral cancer    Asthma Daughter     Autoimmune disease Daughter     No Known Problems Maternal Grandmother     No Known Problems Maternal Grandfather     No Known Problems Paternal Grandmother     No Known Problems Paternal Grandfather     Alcohol abuse Brother     Completed Suicide  Brother     Substance Abuse Brother     Drug abuse Brother     Psychiatric Illness Brother     Schizophrenia Brother     No Known Problems Brother     Substance Abuse Son        History Review: The following portions of the patient's history were reviewed and updated as appropriate: allergies, current medications, past family history, past medical history, past social history, past surgical history, and problem list.         OBJECTIVE:     Vital signs in last 24 hours:    There were no vitals filed for this visit.    Mental Status Evaluation:    Appearance age appropriate, casually dressed   Behavior cooperative, calm   Speech normal rate, normal volume, normal pitch   Mood euthymic   Affect normal range and intensity, appropriate   Thought Processes organized, goal directed   Associations intact associations   Thought Content no overt delusions   Perceptual Disturbances: no auditory hallucinations, no visual hallucinations   Abnormal Thoughts  Risk Potential Suicidal ideation - None  Homicidal ideation - None  Potential for aggression - No   Orientation oriented to person, place, time/date, and situation   Memory recent and remote memory grossly intact   Consciousness alert and awake   Attention Span Concentration Span attention span and concentration are age appropriate   Intellect appears to be of average intelligence   Insight intact   Judgement intact   Muscle Strength and  Gait unable to assess today due to virtual visit   Motor activity unable to assess today due to virtual visit   Language no difficulty naming common objects, no difficulty  repeating a phrase   Fund of Knowledge adequate knowledge of current events  adequate fund of knowledge regarding past history  adequate fund of knowledge regarding vocabulary    Pain none   Pain Scale Did not ask patient to formally rate       Laboratory Results: I have personally reviewed all pertinent laboratory/tests results    Recent Labs (last 2 months):   No visits with results within 2 Month(s) from this visit.   Latest known visit with results is:   Orders Only on 10/04/2023   Component Date Value    Total Iron Binding [place]* 10/04/2023 385     UIBC 10/04/2023 225     Iron, Serum 10/04/2023 160 (H)     Iron Saturation 10/04/2023 42     Ferritin 10/04/2023 101        Suicide/Homicide Risk Assessment:    Risk of Harm to Self:  The following ratings are based on assessment at the time of the interview  Historical Risk Factors include: chronic psychiatric problems  Protective Factors: no current suicidal ideation, compliant with medications, compliant with mental health treatment, good self-esteem, having a desire to live, stable living environment, strong relationships    Risk of Harm to Others:  The following ratings are based on assessment at the time of the interview  Historical Risk Factors include: none.  Protective Factors: no current homicidal ideation    The following interventions are recommended: contracts for safety at present - agrees to go to ED if feeling unsafe, contracts for safety at present - agrees to call Crisis Intervention Service if feeling unsafe      Lethality Statement:    Based on today's assessment and clinical criteria, Sanjana Mares contracts for safety and is not an imminent risk of harm to self or others. Outpatient level of care is deemed appropriate at this current time. Sanjana understands that if they can no longer contract for safety, they need to call the office or report to their nearest Emergency Room for immediate evaluation. They voiced understanding and agreement to  call 911 or head to the nearest ED should they have any physical or mental decompensation whatsoever.       Assessment/Plan:     1.)  MDD  2.)  SHAD  3.)      After discussion of risks, benefits, and side effects, alternatives, we will continue on current regimen as is.  Regimen consists of Abilify 10 mg/day, BuSpar 10 mg 3 times daily, Cymbalta 60 mg daily, trazodone 100 mg as needed for sleep.  Patient voices understanding and agreement to our crisis plan.      Medication management every 3 months  Aware of 24 hour and weekend coverage for urgent situations accessed by calling [facility] main practice number    Medications Risks/Benefits      Risks, Benefits And Possible Side Effects Of Medications:    Risks, benefits, and possible side effects of medications explained to Sanjana and she verbalizes understanding and agreement for treatment.    Controlled Medication Discussion:     Sanjana has been filling controlled prescriptions on time as prescribed according to [place] Prescription Drug Monitoring Program    Psychotherapy Provided:     Individual psychotherapy provided: Crisis/safety plan discussed with Sanjana.     Treatment Plan:    Completed and signed during the session:  We will complete at next appointment due to lack of time      Visit Time    Visit Start Time: 2:00 PM  Visit Stop Time: 2:10 PM  Total Visit Duration:  10 minutes     The total visit duration detailed above includes: patient engagement, medication management, psychotherapy/counseling, discussion regarding treatment goals, documentation, review of past medical records, and coordination of care.      Note Share Disclaimer:     This note was not shared with the patient due to reasonable likelihood of causing patient harm      Surekha Del Real DO  Psychiatry  01/17/24

## 2024-01-17 NOTE — PLAN OF CARE
Problem: Knowledge Deficit  Goal: Patient/family/caregiver demonstrates understanding of disease process, treatment plan, medications, and discharge instructions  Description: Complete learning assessment and assess knowledge base    Interventions:  - Provide teaching at level of understanding  - Provide teaching via preferred learning methods  Outcome: Progressing
Patient presents to the ER with complaints of abdominal pain. Patient just seen here yesterday in the ED for the same thing. Patient states he has an ostomy with little to no output.

## 2024-01-18 ENCOUNTER — TELEPHONE (OUTPATIENT)
Dept: GASTROENTEROLOGY | Facility: CLINIC | Age: 54
End: 2024-01-18

## 2024-01-18 NOTE — TELEPHONE ENCOUNTER
Procedure confirmed  Colonoscopy     Via: Spoke with patient.      Instructions given: MyChart     Prep Given: Miralax/Dulcolax    Call the office if there are any questions.

## 2024-01-22 ENCOUNTER — HOSPITAL ENCOUNTER (OUTPATIENT)
Dept: MRI IMAGING | Facility: HOSPITAL | Age: 54
Discharge: HOME | End: 2024-01-22
Payer: COMMERCIAL

## 2024-01-22 DIAGNOSIS — M54.2 CERVICALGIA: ICD-10-CM

## 2024-01-22 DIAGNOSIS — M54.12 RADICULOPATHY, CERVICAL REGION: ICD-10-CM

## 2024-01-22 DIAGNOSIS — M48.02 SPINAL STENOSIS, CERVICAL REGION: ICD-10-CM

## 2024-01-22 DIAGNOSIS — M47.812 SPONDYLOSIS WITHOUT MYELOPATHY OR RADICULOPATHY, CERVICAL REGION: ICD-10-CM

## 2024-01-22 PROCEDURE — G1004 CDSM NDSC: HCPCS

## 2024-01-22 PROCEDURE — 72141 MRI NECK SPINE W/O DYE: CPT

## 2024-01-26 ENCOUNTER — ANESTHESIA EVENT (OUTPATIENT)
Dept: GASTROENTEROLOGY | Facility: AMBULATORY SURGERY CENTER | Age: 54
End: 2024-01-26

## 2024-01-26 ENCOUNTER — HOSPITAL ENCOUNTER (OUTPATIENT)
Dept: GASTROENTEROLOGY | Facility: AMBULATORY SURGERY CENTER | Age: 54
Discharge: HOME | End: 2024-01-26
Attending: INTERNAL MEDICINE
Payer: COMMERCIAL

## 2024-01-26 ENCOUNTER — ANESTHESIA (OUTPATIENT)
Dept: ANESTHESIOLOGY | Facility: AMBULATORY SURGERY CENTER | Age: 54
End: 2024-01-26

## 2024-01-26 ENCOUNTER — ANESTHESIA EVENT (OUTPATIENT)
Dept: ANESTHESIOLOGY | Facility: AMBULATORY SURGERY CENTER | Age: 54
End: 2024-01-26

## 2024-01-26 ENCOUNTER — ANESTHESIA (OUTPATIENT)
Dept: GASTROENTEROLOGY | Facility: AMBULATORY SURGERY CENTER | Age: 54
End: 2024-01-26

## 2024-01-26 VITALS
HEART RATE: 64 BPM | RESPIRATION RATE: 15 BRPM | SYSTOLIC BLOOD PRESSURE: 132 MMHG | OXYGEN SATURATION: 99 % | BODY MASS INDEX: 28.14 KG/M2 | WEIGHT: 190 LBS | DIASTOLIC BLOOD PRESSURE: 60 MMHG | HEIGHT: 69 IN | TEMPERATURE: 98 F

## 2024-01-26 DIAGNOSIS — R19.5 POSITIVE COLORECTAL CANCER SCREENING USING COLOGUARD TEST: ICD-10-CM

## 2024-01-26 PROCEDURE — G0105 COLORECTAL SCRN; HI RISK IND: HCPCS | Performed by: STUDENT IN AN ORGANIZED HEALTH CARE EDUCATION/TRAINING PROGRAM

## 2024-01-26 RX ORDER — ALBUTEROL SULFATE 90 UG/1
2 AEROSOL, METERED RESPIRATORY (INHALATION) EVERY 6 HOURS PRN
COMMUNITY

## 2024-01-26 RX ORDER — SODIUM CHLORIDE, SODIUM LACTATE, POTASSIUM CHLORIDE, CALCIUM CHLORIDE 600; 310; 30; 20 MG/100ML; MG/100ML; MG/100ML; MG/100ML
50 INJECTION, SOLUTION INTRAVENOUS CONTINUOUS
Status: DISCONTINUED | OUTPATIENT
Start: 2024-01-26 | End: 2024-01-30 | Stop reason: HOSPADM

## 2024-01-26 RX ORDER — PROPOFOL 10 MG/ML
INJECTION, EMULSION INTRAVENOUS AS NEEDED
Status: DISCONTINUED | OUTPATIENT
Start: 2024-01-26 | End: 2024-01-26

## 2024-01-26 RX ORDER — LIDOCAINE HYDROCHLORIDE 10 MG/ML
INJECTION, SOLUTION EPIDURAL; INFILTRATION; INTRACAUDAL; PERINEURAL AS NEEDED
Status: DISCONTINUED | OUTPATIENT
Start: 2024-01-26 | End: 2024-01-26

## 2024-01-26 RX ADMIN — PROPOFOL 50 MG: 10 INJECTION, EMULSION INTRAVENOUS at 10:21

## 2024-01-26 RX ADMIN — LIDOCAINE HYDROCHLORIDE 50 MG: 10 INJECTION, SOLUTION EPIDURAL; INFILTRATION; INTRACAUDAL; PERINEURAL at 10:18

## 2024-01-26 RX ADMIN — PROPOFOL 50 MG: 10 INJECTION, EMULSION INTRAVENOUS at 10:24

## 2024-01-26 RX ADMIN — PROPOFOL 150 MG: 10 INJECTION, EMULSION INTRAVENOUS at 10:18

## 2024-01-26 RX ADMIN — SODIUM CHLORIDE, SODIUM LACTATE, POTASSIUM CHLORIDE, CALCIUM CHLORIDE 50 ML/HR: 600; 310; 30; 20 INJECTION, SOLUTION INTRAVENOUS at 09:57

## 2024-01-26 NOTE — H&P
History and Physical - [facility]  Sanjana Mares 53 y.o. female MRN: [id number]    HPI: Sanjana Mares is a 53 y.o. female who presents for screening colonoscopy.    REVIEW OF SYSTEMS: Per the HPI, and otherwise unremarkable.    Historical Information   Past Medical History:   Diagnosis Date    Allergic     Anemia     Anxiety     Asthma     allergy induced    Depression     Eating disorder     prior to her gastric bypass was an over-eater    Iron deficiency     Migraine     Obesity     RSD (reflex sympathetic dystrophy)     Thalassemia minor     TIA (transient ischemic attack)     reaction to baclofen??     Past Surgical History:   Procedure Laterality Date     SECTION      ,     CHOLECYSTECTOMY      GASTRIC BYPASS N/A 2005    TONSILLECTOMY       Social History   Social History     Substance and Sexual Activity   Alcohol Use Not Currently     Social History     Substance and Sexual Activity   Drug Use Yes    Types: Marijuana    Comment: medical marijuana     Social History     Tobacco Use   Smoking Status Former    Current packs/day: 0.00    Average packs/day: 1 pack/day for 27.3 years (27.3 ttl pk-yrs)    Types: Cigarettes    Start date: 1982    Quit date: 2009    Years since quittin.7   Smokeless Tobacco Never     Family History   Problem Relation Age of Onset    Mental illness Mother     Depression Mother     Diabetes Mother         Actually this is moms mom    ADD / ADHD Mother     Anxiety disorder Mother     Heart disease Father     Cancer Father 40        Oral cancer    Asthma Daughter     Autoimmune disease Daughter     No Known Problems Maternal Grandmother     No Known Problems Maternal Grandfather     No Known Problems Paternal Grandmother     No Known Problems Paternal Grandfather     Alcohol abuse Brother     Completed Suicide  Brother     Substance Abuse Brother     Drug abuse Brother     Psychiatric Illness Brother     Schizophrenia Brother   "   No Known Problems Brother     Substance Abuse Son        Meds/Allergies       Current Outpatient Medications:     ARIPiprazole (ABILIFY) 10 mg tablet    busPIRone (BUSPAR) 10 mg tablet    butalbital-acetaminophen-caffeine (FIORICET,ESGIC) -40 mg per tablet    cetirizine (ZyrTEC) 10 mg tablet    DULoxetine (CYMBALTA) 60 mg delayed release capsule    morphine 20 MG/5ML solution    ondansetron (ZOFRAN) 8 mg tablet    TiZANidine (ZANAFLEX) 4 MG capsule    topiramate (TOPAMAX) 25 mg tablet    traZODone (DESYREL) 100 mg tablet    albuterol (PROVENTIL HFA,VENTOLIN HFA) 90 mcg/act inhaler    transdermal buprenorphine (BUTRANS) 20 mcg/hr PTWK TD patch    Current Facility-Administered Medications:     lactated ringers infusion, 50 mL/hr, Intravenous, Continuous, 50 mL/hr at 01/26/24 0957    Allergies   Allergen Reactions    Baclofen Confusion     TIA    Coconut Oil - Food Allergy Swelling    Imitrex [Sumatriptan] Chest Pain       Objective     /70   Pulse 64   Temp 98 °F (36.7 °C) (Temporal)   Resp 15   Ht 5' 9\" (1.753 m)   Wt 86.2 kg (190 lb)   SpO2 100%   BMI 28.06 kg/m²     PHYSICAL EXAM    Gen: NAD AAOx3  Head: Normocephalic, Atraumatic  CV: S1S2 RRR no m/r/g  CHEST: Clear b/l no c/r/w  ABD: soft, +BS NT/ND  EXT: no edema    ASSESSMENT/PLAN:  This is a 53 y.o. year old female here for colonoscopy, and she is stable and optimized for her procedure.        "

## 2024-01-26 NOTE — ANESTHESIA PREPROCEDURE EVALUATION
Procedure:  PRE-OP ONLY    Relevant Problems   CARDIO   (+) Migraine without aura   (+) Mixed hyperlipidemia      HEMATOLOGY   (+) Thalassemia minor      NEURO/PSYCH  RSD/CRPS on buprenorphine patch   (+) Anxiety   (+) Chronic pain syndrome   (+) Continuous opioid dependence (HCC)   (+) Depression, recurrent (HCC)   (+) Migraine without aura   (+) RSD (reflex sympathetic dystrophy)        Physical Exam    Airway       Dental       Cardiovascular      Pulmonary      Other Findings  post-pubertal.

## 2024-01-26 NOTE — ANESTHESIA POSTPROCEDURE EVALUATION
Post-Op Assessment Note    CV Status:  Stable  Pain Score: 0    Pain management: adequate       Mental Status:  Alert and awake   Hydration Status:  Euvolemic   PONV Controlled:  Controlled   Airway Patency:  Patent     Post Op Vitals Reviewed: Yes    No anethesia notable event occurred.    Staff: Anesthesiologist, CRNA               BP   116/60   Temp   N/a   Pulse  78   Resp   19   SpO2   98

## 2024-01-26 NOTE — ANESTHESIA PREPROCEDURE EVALUATION
Procedure:  COLONOSCOPY    Relevant Problems   CARDIO   (+) Migraine without aura   (+) Mixed hyperlipidemia      HEMATOLOGY   (+) Thalassemia minor      NEURO/PSYCH  RSD/CRPS on buprenorphine patch   (+) Anxiety   (+) Chronic pain syndrome   (+) Continuous opioid dependence (HCC)   (+) Depression, recurrent (HCC)   (+) Migraine without aura   (+) RSD (reflex sympathetic dystrophy)        Physical Exam    Airway    Mallampati score: II  TM Distance: >3 FB  Neck ROM: full     Dental   No notable dental hx     Cardiovascular      Pulmonary      Other Findings        Anesthesia Plan  ASA Score- 2     Anesthesia Type- IV sedation with anesthesia with ASA Monitors.         Additional Monitors:     Airway Plan:            Plan Factors-Exercise tolerance (METS): >4 METS.    Chart reviewed.    Patient summary reviewed.    Patient is a current smoker (med marijuana).              Induction- intravenous.    Postoperative Plan-     Informed Consent- Anesthetic plan and risks discussed with patient.  I personally reviewed this patient with the CRNA. Discussed and agreed on the Anesthesia Plan with the CRNA..

## 2024-03-21 NOTE — PROGRESS NOTES
Name: Sanjana Mares      : 1970      MRN: [id number]  Encounter Provider: Cecille Chavez DO  Encounter Date: 3/22/2024   Encounter department: [facility] PRIMARY CARE    Assessment & Plan     1. Bilateral lower extremity edema  -     Compression Stocking  -     Comprehensive metabolic panel; Future; Expected date: 2024  -     TSH, 3rd generation with Free T4 reflex; Future; Expected date: 2024  -     Comprehensive metabolic panel  -     TSH, 3rd generation with Free T4 reflex  Seems to come and go.    Had this issue last September and resolved without treatment/medication. Her Echocardiogram at the time showed normal LV systolic function  Has recurred in last couple of months. Today, seems isolated to bilateral ankles.  Etiology is unclear  ? Venous stasis/hypertension.   Recommend she get knee high compression stockings and put on every AM upon awakening.   Her bp is elevated on office today. Will start hctz which may help a little with edema though not a very potent diuretic.   Recheck CMP, TSH  F/U 1 month  2. BMI 29.0-29.9,adult  -     Ambulatory Referral to Weight Management; Future  She has history of gastric bypass and is frustrated with her weight.   Refer to weight management  3. History of gastric bypass  -     Ambulatory Referral to Weight Management; Future    4. Continuous opioid dependence (HCC)  Seeing pain management  5. Depression, recurrent (HCC)  Doing great right now.   6. Essential hypertension  -     hydroCHLOROthiazide 25 mg tablet; Take 1 tablet (25 mg total) by mouth daily  -     Comprehensive metabolic panel; Future; Expected date: 2024  -     Comprehensive metabolic panel  New  Start hctz.   7. Iron deficiency  -     CBC and differential; Future  -     Iron; Future  -     Ferritin; Future  -     CBC and differential  -     Iron  -     Ferritin     Follows with hematology, Dr. Velarde.   Lab Results   Component Value Date    WBC 7.4 2023  "   HGB 12.1 03/30/2023    HCT 38.2 03/30/2023    MCV 65 (L) 03/30/2023     03/30/2023     Has not had labs done in awhile so will check cbc, iron and ferritin levels prior to next OV.     Subjective     HPI  Patient is a 53 year old female with depression/anxiety, RSD on chronic opioids, migraines, gastric bypass in 2005 and iron deficiency anemia, thalassemia minor trait being seen today for lower extremity edema/weight gain. She states that in early January was able to get weight down to 177 with watching diet. She has since gained it all back.  Feels puffy in general--ankles, hands, eyelids. No abdominal bloating. No shortness of breath. Walking did not seem to help the lower extremity swelling.     Watching diet very closely overall since her gastric bypass which was done [place] No processed foods.     She was last seen in September of 2023 for her routine f/u and noted this same complaint. She stated that she was getting  \"fluid retention\". Stated that her weight was more than it has ever been since her gastric bypass. Noticed over the summer months that legs seemed more swollen than usual. At times felt like she has swelling in hands as well. She took photo of the swelling to show me. She stated that swelling does not go down when she sleeps.   Had no associated shortness of breath, chest pain or palpitations. No pain in feet or ankles. No redness or warmth in legs.     Etiology of her edema unclear and thought perhaps due to low albumin vs venous stasis. Echo ordered to rule out cardiac etiology and this was completed on 9/29/23 and showed normal systolic function with EF of 65% and normal diastolic function. Her labs showed normal albumin and thyroid was normal. We discussed having her use compression stockings but she never got them. At time of her f/u appt in November, the swelling had resolved.     Patient saw hematology for her iron deficiency on 12/5/22. Received IV iron therapy.     Went for " her colonoscopy on 24 after having + cologuard. This showed poor prep and she was advised to have repeat in 6 month.     Never went for lung cancer screen as ordered in November. Reminded her to schedule.     Past Medical History:   Diagnosis Date   • Allergic    • Anemia    • Anxiety    • Asthma     allergy induced   • Depression    • Eating disorder     prior to her gastric bypass was an over-eater   • Headache(784.0)    • Iron deficiency    • Migraine    • Obesity    • RSD (reflex sympathetic dystrophy)    • Thalassemia minor    • TIA (transient ischemic attack)     reaction to baclofen??     Past Surgical History:   Procedure Laterality Date   •  SECTION      ,    • CHOLECYSTECTOMY     • GASTRIC BYPASS N/A 2005   • TONSILLECTOMY       Family History   Problem Relation Age of Onset   • Mental illness Mother    • Depression Mother    • Diabetes Mother         Actually this is moms mom   • ADD / ADHD Mother    • Anxiety disorder Mother    • Heart disease Father    • Cancer Father 40        Oral cancer   • Asthma Daughter    • Autoimmune disease Daughter    • No Known Problems Maternal Grandmother    • No Known Problems Maternal Grandfather    • No Known Problems Paternal Grandmother    • No Known Problems Paternal Grandfather    • Alcohol abuse Brother    • Completed Suicide  Brother    • Substance Abuse Brother    • Drug abuse Brother    • Psychiatric Illness Brother    • Schizophrenia Brother    • No Known Problems Brother    • Substance Abuse Son      Social History     Socioeconomic History   • Marital status: Single     Spouse name: None   • Number of children: None   • Years of education: None   • Highest education level: None   Occupational History   • None   Tobacco Use   • Smoking status: Former     Current packs/day: 0.00     Average packs/day: 1 pack/day for 27.3 years (27.3 ttl pk-yrs)     Types: Cigarettes     Start date: 1982     Quit date: 2009     Years since  "quittin.9   • Smokeless tobacco: Never   Vaping Use   • Vaping status: Never Used   Substance and Sexual Activity   • Alcohol use: Not Currently   • Drug use: Yes     Frequency: 5.0 times per week     Types: Marijuana     Comment: medical marijuana   • Sexual activity: Not Currently     Partners: Male     Birth control/protection: Condom Male   Other Topics Concern   • None   Social History Narrative        2 children who are recovering addicts.     Daughter lives in [place] and son lives with her    Is a \"Universal \"     Social Determinants of Health     Financial Resource Strain: Low Risk  (2022)    Overall Financial Resource Strain (CARDIA)    • Difficulty of Paying Living Expenses: Not hard at all   Food Insecurity: Not on file   Transportation Needs: Unmet Transportation Needs (2022)    PRAPARE - Transportation    • Lack of Transportation (Medical): Yes    • Lack of Transportation (Non-Medical): Yes   Physical Activity: Not on file   Stress: Not on file   Social Connections: Not on file   Intimate Partner Violence: Not on file   Housing Stability: Not on file     Current Outpatient Medications on File Prior to Visit   Medication Sig   • ARIPiprazole (ABILIFY) 10 mg tablet Take 1 PO Q HS   • busPIRone (BUSPAR) 10 mg tablet Take 1 PO TID   • butalbital-acetaminophen-caffeine (FIORICET,ESGIC) -40 mg per tablet Take 30 tablets by mouth every 28 days   • cetirizine (ZyrTEC) 10 mg tablet Take 10 mg by mouth daily   • DULoxetine (CYMBALTA) 60 mg delayed release capsule Duloxetine 60m capsule in the morning   • morphine 20 MG/5ML solution Take 20 mg by mouth 3 (three) times a day   • ondansetron (ZOFRAN) 8 mg tablet Take 8 mg by mouth every 8 (eight) hours as needed for nausea or vomiting   • TiZANidine (ZANAFLEX) 4 MG capsule Take 4 mg by mouth every 8 (eight) hours   • topiramate (TOPAMAX) 25 mg tablet Take 100 mg by mouth 2 (two) times a day   • transdermal buprenorphine " "(BUTRANS) 20 mcg/hr PTWK TD patch Place 1 patch on the skin once a week   • traZODone (DESYREL) 100 mg tablet Take 1 to 1 1/2 PO HS PRN   • [DISCONTINUED] albuterol (PROVENTIL HFA,VENTOLIN HFA) 90 mcg/act inhaler Inhale 2 puffs every 6 (six) hours as needed for wheezing     Allergies   Allergen Reactions   • Baclofen Confusion     TIA   • Coconut Oil - Food Allergy Swelling   • Imitrex [Sumatriptan] Chest Pain     Immunization History   Administered Date(s) Administered   • COVID-19 PFIZER VACCINE 0.3 ML IM 04/07/2021, 04/27/2021   • COVID-19 Pfizer Vac BIVALENT Derrell-sucrose 12 Yr+ IM 09/20/2022   • Influenza, injectable, quadrivalent, preservative free 0.5 mL 11/27/2023   • Influenza, recombinant, quadrivalent,injectable, preservative free 09/27/2022       Objective     /89   Pulse 86   Temp 98.6 °F (37 °C) (Tympanic)   Ht 5' 9\" (1.753 m)   Wt 89.2 kg (196 lb 9.6 oz)   SpO2 100%   BMI 29.03 kg/m²     Physical Exam  Vitals and nursing note reviewed.   Constitutional:       General: She is not in acute distress.     Appearance: Normal appearance. She is not ill-appearing or diaphoretic.   HENT:      Head: Normocephalic and atraumatic.      Nose: Nose normal.      Mouth/Throat:      Pharynx: No posterior oropharyngeal erythema.   Eyes:      Extraocular Movements: Extraocular movements intact.      Conjunctiva/sclera: Conjunctivae normal.      Pupils: Pupils are equal, round, and reactive to light.   Neck:      Vascular: No carotid bruit.      Comments: Negative Hepatojugular reflux   No thyromegaly  Cardiovascular:      Rate and Rhythm: Normal rate and regular rhythm.      Heart sounds: No murmur heard.     No friction rub. No gallop.   Pulmonary:      Effort: Pulmonary effort is normal.      Breath sounds: Normal breath sounds. No wheezing, rhonchi or rales.   Abdominal:      General: Abdomen is flat. Bowel sounds are normal. There is no distension.      Palpations: Abdomen is soft. There is no mass.      " Tenderness: There is no abdominal tenderness.   Musculoskeletal:      Cervical back: Normal range of motion and neck supple.      Right lower leg: Edema present.      Left lower leg: Edema (bilateral ankle edema. no tenderness. no swelling in calves/lower legs. no swelling noted in hands/upper extremities) present.   Lymphadenopathy:      Cervical: No cervical adenopathy.   Neurological:      General: No focal deficit present.      Mental Status: She is alert and oriented to person, place, and time.   Psychiatric:         Mood and Affect: Mood normal.       Cecille Chavez, DO

## 2024-03-22 ENCOUNTER — OFFICE VISIT (OUTPATIENT)
Dept: FAMILY MEDICINE CLINIC | Facility: CLINIC | Age: 54
End: 2024-03-22
Payer: COMMERCIAL

## 2024-03-22 VITALS
HEIGHT: 69 IN | OXYGEN SATURATION: 100 % | DIASTOLIC BLOOD PRESSURE: 89 MMHG | BODY MASS INDEX: 29.12 KG/M2 | TEMPERATURE: 98.6 F | SYSTOLIC BLOOD PRESSURE: 142 MMHG | HEART RATE: 86 BPM | WEIGHT: 196.6 LBS

## 2024-03-22 DIAGNOSIS — R60.0 BILATERAL LOWER EXTREMITY EDEMA: Primary | ICD-10-CM

## 2024-03-22 DIAGNOSIS — R74.01 TRANSAMINASEMIA: ICD-10-CM

## 2024-03-22 DIAGNOSIS — F33.9 DEPRESSION, RECURRENT (HCC): ICD-10-CM

## 2024-03-22 DIAGNOSIS — E61.1 IRON DEFICIENCY: ICD-10-CM

## 2024-03-22 DIAGNOSIS — F11.20 CONTINUOUS OPIOID DEPENDENCE (HCC): ICD-10-CM

## 2024-03-22 DIAGNOSIS — I10 ESSENTIAL HYPERTENSION: ICD-10-CM

## 2024-03-22 DIAGNOSIS — Z98.84 HISTORY OF GASTRIC BYPASS: ICD-10-CM

## 2024-03-22 DIAGNOSIS — R74.8 ELEVATED ALKALINE PHOSPHATASE LEVEL: ICD-10-CM

## 2024-03-22 PROCEDURE — 99214 OFFICE O/P EST MOD 30 MIN: CPT | Performed by: FAMILY MEDICINE

## 2024-03-22 PROCEDURE — G2211 COMPLEX E/M VISIT ADD ON: HCPCS | Performed by: FAMILY MEDICINE

## 2024-03-22 RX ORDER — HYDROCHLOROTHIAZIDE 25 MG/1
25 TABLET ORAL DAILY
Qty: 30 TABLET | Refills: 5 | Status: SHIPPED | OUTPATIENT
Start: 2024-03-22

## 2024-03-29 LAB
ALBUMIN SERPL-MCNC: 4.5 G/DL (ref 3.8–4.9)
ALBUMIN/GLOB SERPL: 1.8 {RATIO} (ref 1.2–2.2)
ALP SERPL-CCNC: 147 IU/L (ref 44–121)
ALT SERPL-CCNC: 35 IU/L (ref 0–32)
AST SERPL-CCNC: 40 IU/L (ref 0–40)
BASOPHILS # BLD AUTO: 0.1 X10E3/UL (ref 0–0.2)
BASOPHILS NFR BLD AUTO: 1 %
BILIRUB SERPL-MCNC: 0.2 MG/DL (ref 0–1.2)
BUN SERPL-MCNC: 15 MG/DL (ref 6–24)
BUN/CREAT SERPL: 17 (ref 9–23)
CALCIUM SERPL-MCNC: 9.3 MG/DL (ref 8.7–10.2)
CHLORIDE SERPL-SCNC: 97 MMOL/L (ref 96–106)
CO2 SERPL-SCNC: 23 MMOL/L (ref 20–29)
CREAT SERPL-MCNC: 0.86 MG/DL (ref 0.57–1)
EGFR: 80 ML/MIN/1.73
EOSINOPHIL # BLD AUTO: 1.1 X10E3/UL (ref 0–0.4)
EOSINOPHIL NFR BLD AUTO: 15 %
ERYTHROCYTE [DISTWIDTH] IN BLOOD BY AUTOMATED COUNT: 15.9 % (ref 11.7–15.4)
FERRITIN SERPL-MCNC: 66 NG/ML (ref 15–150)
GLOBULIN SER-MCNC: 2.5 G/DL (ref 1.5–4.5)
GLUCOSE SERPL-MCNC: 112 MG/DL (ref 70–99)
HCT VFR BLD AUTO: 38.6 % (ref 34–46.6)
HGB BLD-MCNC: 11.8 G/DL (ref 11.1–15.9)
IMM GRANULOCYTES # BLD: 0 X10E3/UL (ref 0–0.1)
IMM GRANULOCYTES NFR BLD: 0 %
IRON SERPL-MCNC: 73 UG/DL (ref 27–159)
LYMPHOCYTES # BLD AUTO: 2.6 X10E3/UL (ref 0.7–3.1)
LYMPHOCYTES NFR BLD AUTO: 37 %
MCH RBC QN AUTO: 21 PG (ref 26.6–33)
MCHC RBC AUTO-ENTMCNC: 30.6 G/DL (ref 31.5–35.7)
MCV RBC AUTO: 69 FL (ref 79–97)
MONOCYTES # BLD AUTO: 0.5 X10E3/UL (ref 0.1–0.9)
MONOCYTES NFR BLD AUTO: 7 %
NEUTROPHILS # BLD AUTO: 2.9 X10E3/UL (ref 1.4–7)
NEUTROPHILS NFR BLD AUTO: 40 %
PLATELET # BLD AUTO: 386 X10E3/UL (ref 150–450)
POTASSIUM SERPL-SCNC: 3.6 MMOL/L (ref 3.5–5.2)
PROT SERPL-MCNC: 7 G/DL (ref 6–8.5)
RBC # BLD AUTO: 5.61 X10E6/UL (ref 3.77–5.28)
SODIUM SERPL-SCNC: 137 MMOL/L (ref 134–144)
TSH SERPL DL<=0.005 MIU/L-ACNC: 2.24 UIU/ML (ref 0.45–4.5)
WBC # BLD AUTO: 7.2 X10E3/UL (ref 3.4–10.8)

## 2024-04-04 ENCOUNTER — TELEMEDICINE (OUTPATIENT)
Dept: PSYCHIATRY | Facility: CLINIC | Age: 54
End: 2024-04-04
Payer: COMMERCIAL

## 2024-04-04 DIAGNOSIS — F06.31 MOOD DISORDER DUE TO KNOWN PHYSIOLOGICAL CONDITION WITH DEPRESSIVE FEATURES: ICD-10-CM

## 2024-04-04 DIAGNOSIS — F41.1 GENERALIZED ANXIETY DISORDER: Primary | ICD-10-CM

## 2024-04-04 PROCEDURE — 99214 OFFICE O/P EST MOD 30 MIN: CPT | Performed by: STUDENT IN AN ORGANIZED HEALTH CARE EDUCATION/TRAINING PROGRAM

## 2024-04-04 RX ORDER — BUSPIRONE HYDROCHLORIDE 10 MG/1
TABLET ORAL
Qty: 90 TABLET | Refills: 3 | Status: SHIPPED | OUTPATIENT
Start: 2024-04-04

## 2024-04-04 RX ORDER — TRAZODONE HYDROCHLORIDE 100 MG/1
TABLET ORAL
Qty: 30 TABLET | Refills: 2 | Status: SHIPPED | OUTPATIENT
Start: 2024-04-04

## 2024-04-04 RX ORDER — ARIPIPRAZOLE 10 MG/1
TABLET ORAL
Qty: 30 TABLET | Refills: 3 | Status: SHIPPED | OUTPATIENT
Start: 2024-04-04

## 2024-04-04 RX ORDER — DULOXETIN HYDROCHLORIDE 60 MG/1
CAPSULE, DELAYED RELEASE ORAL
Qty: 30 CAPSULE | Refills: 3 | Status: SHIPPED | OUTPATIENT
Start: 2024-04-04

## 2024-04-04 NOTE — PSYCH
MEDICATION MANAGEMENT NOTE        [facility]      Name and Date of Birth:  Sanjana Mares 53 y.o. 1970 MRN: [id number]    Date of Visit: April 4, 2024    Reason for Visit: Follow-up visit for medication management     Virtual Visit Disclaimer:       TeleMed provider: Surekha Del Real D.O.    Location: [place]     Verification of patient location:     Patient is currently located in the [facility]  Patient is currently located in a state in which I am licensed     After connecting through [employer], the patient was identified by name and date of birth.  Sanjana Mares was informed that this is a telemedicine visit that is being conducted through [employer], and the patient was informed that this is a secure, HIPAA-compliant platform. My office door was closed. No one else was in the room. Sanjana Mares acknowledged consent and understanding of privacy and security of the video platform. Sanjana understands that the online visit is based solely on information provided by the patient, and that, in the absence of a face-to-face physical evaluation by the physician, the diagnosis Sanjana  receives is both limited and provisional in terms of accuracy and completeness. Sanjana Mares understands that they can discontinue the visit at any time. I informed Sanjana that I have reviewed their record in [facility] and presented the opportunity for them to ask any questions regarding the visit today. Sanjana Mares voiced understanding and consented to these terms. Sanjana is aware this is a billable service. Sanjana is present at her home and primary address      SUBJECTIVE:    Sanjana Mares is a 53 y.o. female with past psychiatric history significant for  who was personally seen and evaluated today at the [facility] outpatient clinic for follow-up and medication management. Sanjana denied SI, HI, AVH, delusions, cosme since her last appointment.  She stated  that she remains engaged with family, friends and personal hobbies and after discussion of risks, benefits, potential side effects, alternatives, patient wishes to continue on current regimen as is without any changes due to reported stability.  Patient states that she feels significantly improved since our previous appointment because she has a lower mood in the winter which started to get better during the spring and summer naturally.        Current Rating Scores:     None completed today.    Review Of Systems:      Constitutional negative   ENT negative   Cardiovascular negative   Respiratory negative   Gastrointestinal negative   Genitourinary negative   Musculoskeletal negative   Integumentary negative   Neurological negative   Endocrine negative   Other Symptoms none, all other systems are negative       Past Psychiatric History: (unchanged information from previous note copied and italicized) - Information that is bolded has been updated.     See intake    Substance Abuse History: (unchanged information from previous note copied and italicized) - Information that is bolded has been updated.     See intake    Social History: (unchanged information from previous note copied and italicized) - Information that is bolded has been updated.     See intake    Traumatic History: (unchanged information from previous note copied and italicized) - Information that is bolded has been updated.       See intake    Past Medical History:    Past Medical History:   Diagnosis Date    Allergic     Anemia     Anxiety     Asthma     allergy induced    Depression     Eating disorder     prior to her gastric bypass was an over-eater    Headache(784.0)     Iron deficiency     Migraine     Obesity     RSD (reflex sympathetic dystrophy)     Thalassemia minor     TIA (transient ischemic attack)     reaction to baclofen??        Past Surgical History:   Procedure Laterality Date     SECTION      ,     CHOLECYSTECTOMY   "    GASTRIC BYPASS N/A 2005    TONSILLECTOMY       Allergies   Allergen Reactions    Baclofen Confusion     TIA    Coconut Oil - Food Allergy Swelling    Imitrex [Sumatriptan] Chest Pain       Substance Abuse History:    Social History     Substance and Sexual Activity   Alcohol Use Not Currently     Social History     Substance and Sexual Activity   Drug Use Yes    Frequency: 5.0 times per week    Types: Marijuana    Comment: medical marijuana       Social History:    Social History     Socioeconomic History    Marital status: Single     Spouse name: Not on file    Number of children: Not on file    Years of education: Not on file    Highest education level: Not on file   Occupational History    Not on file   Tobacco Use    Smoking status: Former     Current packs/day: 0.00     Average packs/day: 1 pack/day for 27.3 years (27.3 ttl pk-yrs)     Types: Cigarettes     Start date: 1982     Quit date: 2009     Years since quittin.9    Smokeless tobacco: Never   Vaping Use    Vaping status: Never Used   Substance and Sexual Activity    Alcohol use: Not Currently    Drug use: Yes     Frequency: 5.0 times per week     Types: Marijuana     Comment: medical marijuana    Sexual activity: Not Currently     Partners: Male     Birth control/protection: Condom Male   Other Topics Concern    Not on file   Social History Narrative        2 children who are recovering addicts.     Daughter lives in [place] and son lives with her    Is a \"Universal \"     Social Determinants of Health     Financial Resource Strain: Low Risk  (2022)    Overall Financial Resource Strain (CARDIA)     Difficulty of Paying Living Expenses: Not hard at all   Food Insecurity: Not on file   Transportation Needs: Unmet Transportation Needs (2022)    PRAPARE - Transportation     Lack of Transportation (Medical): Yes     Lack of Transportation (Non-Medical): Yes   Physical Activity: Not on file   Stress: Not on " file   Social Connections: Not on file   Intimate Partner Violence: Not on file   Housing Stability: Not on file       Family Psychiatric History:     Family History   Problem Relation Age of Onset    Mental illness Mother     Depression Mother     Diabetes Mother         Actually this is moms mom    ADD / ADHD Mother     Anxiety disorder Mother     Heart disease Father     Cancer Father 40        Oral cancer    Asthma Daughter     Autoimmune disease Daughter     No Known Problems Maternal Grandmother     No Known Problems Maternal Grandfather     No Known Problems Paternal Grandmother     No Known Problems Paternal Grandfather     Alcohol abuse Brother     Completed Suicide  Brother     Substance Abuse Brother     Drug abuse Brother     Psychiatric Illness Brother     Schizophrenia Brother     No Known Problems Brother     Substance Abuse Son        History Review: The following portions of the patient's history were reviewed and updated as appropriate: allergies, current medications, past family history, past medical history, past social history, past surgical history, and problem list.         OBJECTIVE:     Vital signs in last 24 hours:    There were no vitals filed for this visit.    Mental Status Evaluation:    Appearance age appropriate, casually dressed   Behavior cooperative, calm   Speech normal rate, normal volume, normal pitch   Mood euthymic   Affect normal range and intensity, appropriate   Thought Processes organized, goal directed   Associations intact associations   Thought Content no overt delusions   Perceptual Disturbances: no auditory hallucinations, no visual hallucinations   Abnormal Thoughts  Risk Potential Suicidal ideation - None  Homicidal ideation - None  Potential for aggression - No   Orientation oriented to person, place, time/date, and situation   Memory recent and remote memory grossly intact   Consciousness alert and awake   Attention Span Concentration Span attention span and  concentration are age appropriate   Intellect appears to be of average intelligence   Insight intact   Judgement intact   Muscle Strength and  Gait unable to assess today due to virtual visit   Motor activity unable to assess today due to virtual visit   Language no difficulty naming common objects, no difficulty repeating a phrase   Fund of Knowledge adequate knowledge of current events  adequate fund of knowledge regarding past history  adequate fund of knowledge regarding vocabulary    Pain none   Pain Scale Did not ask patient to formally rate       Laboratory Results: I have personally reviewed all pertinent laboratory/tests results    Recent Labs (last 2 months):   Office Visit on 03/22/2024   Component Date Value    White Blood Cell Count 03/28/2024 7.2     Red Blood Cell Count 03/28/2024 5.61 (H)     Hemoglobin 03/28/2024 11.8     HCT 03/28/2024 38.6     MCV 03/28/2024 69 (L)     MCH 03/28/2024 21.0 (L)     MCHC 03/28/2024 30.6 (L)     RDW 03/28/2024 15.9 (H)     Platelet Count 03/28/2024 386     Neutrophils 03/28/2024 40     Lymphocytes 03/28/2024 37     Monocytes 03/28/2024 7     Eosinophils 03/28/2024 15     Basophils PCT 03/28/2024 1     Neutrophils (Absolute) 03/28/2024 2.9     Lymphocytes (Absolute) 03/28/2024 2.6     Monocytes (Absolute) 03/28/2024 0.5     Eosinophils (Absolute) 03/28/2024 1.1 (H)     Basophils ABS 03/28/2024 0.1     Immature Granulocytes 03/28/2024 0     Immature Granulocytes (A* 03/28/2024 0.0     Glucose, Random 03/28/2024 112 (H)     BUN 03/28/2024 15     Creatinine 03/28/2024 0.86     eGFR 03/28/2024 80     SL AMB BUN/CREATININE RA* 03/28/2024 17     Sodium 03/28/2024 137     Potassium 03/28/2024 3.6     Chloride 03/28/2024 97     CO2 03/28/2024 23     CALCIUM 03/28/2024 9.3     Protein, Total 03/28/2024 7.0     Albumin 03/28/2024 4.5     Globulin, Total 03/28/2024 2.5     Albumin/Globulin Ratio 03/28/2024 1.8     TOTAL BILIRUBIN 03/28/2024 0.2     Alk Phos Isoenzymes 03/28/2024  147 (H)     AST 03/28/2024 40     ALT 03/28/2024 35 (H)     TSH 03/28/2024 2.240     Iron, Serum 03/28/2024 73     Ferritin 03/28/2024 66        Suicide/Homicide Risk Assessment:    Risk of Harm to Self:  The following ratings are based on assessment at the time of the interview  Historical Risk Factors include: chronic psychiatric problems  Protective Factors: no current suicidal ideation, compliant with medications, compliant with mental health treatment, good self-esteem, having a desire to live, stable living environment, strong relationships    Risk of Harm to Others:  The following ratings are based on assessment at the time of the interview  Historical Risk Factors include: none.  Protective Factors: no current homicidal ideation    The following interventions are recommended: contracts for safety at present - agrees to go to ED if feeling unsafe, contracts for safety at present - agrees to call Crisis Intervention Service if feeling unsafe      Lethality Statement:    Based on today's assessment and clinical criteria, Sanjana Mares contracts for safety and is not an imminent risk of harm to self or others. Outpatient level of care is deemed appropriate at this current time. Sanjana understands that if they can no longer contract for safety, they need to call the office or report to their nearest Emergency Room for immediate evaluation. They voiced understanding and agreement to call 911 or head to the nearest ED should they have any physical or mental decompensation whatsoever.       Assessment/Plan:     1.)  MDD  2.)  SHAD  3.)      After discussion of risks, benefits, and side effects, alternatives, we will continue on current regimen as is.  Regimen consists of Abilify 10 mg/day, BuSpar 10 mg 3 times daily, Cymbalta 60 mg daily, trazodone 100 mg as needed for sleep.  Patient voices understanding and agreement to our crisis plan.      Medication management every 3 months  Aware of 24 hour and weekend  coverage for urgent situations accessed by calling [facility] main practice number    Medications Risks/Benefits      Risks, Benefits And Possible Side Effects Of Medications:    Risks, benefits, and possible side effects of medications explained to Sanjana and she verbalizes understanding and agreement for treatment.    Controlled Medication Discussion:     Sanjana has been filling controlled prescriptions on time as prescribed according to [place] Prescription Drug Monitoring Program    Psychotherapy Provided:     Individual psychotherapy provided: Crisis/safety plan discussed with Sanjana.     Treatment Plan:    Completed and signed during the session:  We will complete at next appointment due to lack of time      Visit Time    Visit Start Time: 12:00 PM  Visit Stop Time: 12:10 PM  Total Visit Duration:  10 minutes     The total visit duration detailed above includes: patient engagement, medication management, psychotherapy/counseling, discussion regarding treatment goals, documentation, review of past medical records, and coordination of care.      Note Share Disclaimer:     This note was not shared with the patient due to reasonable likelihood of causing patient harm      Surekha Del Real DO  Psychiatry  04/04/24

## 2024-04-08 ENCOUNTER — TELEPHONE (OUTPATIENT)
Dept: BARIATRICS | Facility: CLINIC | Age: 54
End: 2024-04-08

## 2024-04-22 ENCOUNTER — OFFICE VISIT (OUTPATIENT)
Dept: FAMILY MEDICINE CLINIC | Facility: CLINIC | Age: 54
End: 2024-04-22
Payer: COMMERCIAL

## 2024-04-22 VITALS
TEMPERATURE: 97.2 F | DIASTOLIC BLOOD PRESSURE: 60 MMHG | OXYGEN SATURATION: 98 % | SYSTOLIC BLOOD PRESSURE: 120 MMHG | BODY MASS INDEX: 26.33 KG/M2 | HEART RATE: 65 BPM | HEIGHT: 69 IN | WEIGHT: 177.8 LBS

## 2024-04-22 DIAGNOSIS — T40.2X5A THERAPEUTIC OPIOID-INDUCED CONSTIPATION (OIC): ICD-10-CM

## 2024-04-22 DIAGNOSIS — Z12.31 ENCOUNTER FOR SCREENING MAMMOGRAM FOR BREAST CANCER: ICD-10-CM

## 2024-04-22 DIAGNOSIS — E78.2 MIXED HYPERLIPIDEMIA: ICD-10-CM

## 2024-04-22 DIAGNOSIS — I10 ESSENTIAL HYPERTENSION: ICD-10-CM

## 2024-04-22 DIAGNOSIS — F11.20 CONTINUOUS OPIOID DEPENDENCE (HCC): ICD-10-CM

## 2024-04-22 DIAGNOSIS — K59.03 THERAPEUTIC OPIOID-INDUCED CONSTIPATION (OIC): ICD-10-CM

## 2024-04-22 DIAGNOSIS — R60.0 BILATERAL LOWER EXTREMITY EDEMA: Primary | ICD-10-CM

## 2024-04-22 DIAGNOSIS — R73.01 IMPAIRED FASTING GLUCOSE: ICD-10-CM

## 2024-04-22 PROCEDURE — G2211 COMPLEX E/M VISIT ADD ON: HCPCS | Performed by: FAMILY MEDICINE

## 2024-04-22 PROCEDURE — 99213 OFFICE O/P EST LOW 20 MIN: CPT | Performed by: FAMILY MEDICINE

## 2024-04-22 RX ORDER — NALOXEGOL OXALATE 25 MG/1
25 TABLET, FILM COATED ORAL DAILY
COMMUNITY
Start: 2024-04-03

## 2024-04-22 NOTE — PROGRESS NOTES
"Name: Sanjana Mares      : 1970      MRN: [id number]  Encounter Provider: Cecille Chavez DO  Encounter Date: 2024   Encounter department: [facility] PRIMARY CARE    Assessment & Plan     1. Bilateral lower extremity edema  Edema has resolved since starting hctz and movantik.   2. Encounter for screening mammogram for breast cancer  -     Mammo screening bilateral w 3d & cad; Future; Expected date: 2024    3. Mixed hyperlipidemia  Lab Results   Component Value Date    CHOLESTEROL 225 (H) 2023    CHOLESTEROL 233 (H) 2022     Lab Results   Component Value Date    HDL 71 2023    HDL 58 2022     Lab Results   Component Value Date    TRIG 124 2023    TRIG 220 (H) 2022     No results found for: \"NONHDLC\"  Check lipid panel with labs in   4. Impaired fasting glucose  Glucose elevated on recent labs. Repeat in  along with A1c  5. Essential hypertension  Bp control improved on hctz 25 mg daily  Continue same.   6. Continuous opioid dependence (HCC)     Follows with pain management  7. Opiod induced constipation  Patient now on movantik and is feeling so much better.     Subjective     HPI  Patient is a 53 year old female with migraines, RSD, depression, anxiety, thalassemia minor, iron deficiency, history of gastric bypass and chronic pain syndrome who is being seen today for f/u on her lower extremity edema.     She was last seen here on 3/22/24 for her edema.   The edema seems to come and go and at her visit in March, was isolated to the ankles bilaterally  She was seen for this same issue last September and it resolved without treatment/medication. Her Echocardiogram at the time showed normal LV systolic function  Unclear as to etiology. ? venous stasis  Recommended she get knee high compression stockings and put on every AM upon awakening.   Since  bp was elevated on office at last visit (142/89) she was started on hctz which may help a " little with edema though not a very potent diuretic.   Also sent her to have both cmp and tsh done. Her albumin was normal and TSH was normal as well.     She reports that since last visit, she was diagnosed with opiod induced constipation. Now on movantik. Since starting this, is down 25 lbs. Feels much better. Her edema has resolved as has her abdominal bloating.     Review of Systems    Past Medical History:   Diagnosis Date   • Allergic    • Anemia    • Anxiety    • Asthma     allergy induced   • Depression    • Eating disorder     prior to her gastric bypass was an over-eater   • Headache(784.0)    • Iron deficiency    • Migraine    • Obesity    • RSD (reflex sympathetic dystrophy)    • Thalassemia minor    • TIA (transient ischemic attack)     reaction to baclofen??     Past Surgical History:   Procedure Laterality Date   •  SECTION      ,    • CHOLECYSTECTOMY     • GASTRIC BYPASS N/A 2005   • TONSILLECTOMY       Family History   Problem Relation Age of Onset   • Mental illness Mother    • Depression Mother    • Diabetes Mother         Actually this is moms mom   • ADD / ADHD Mother    • Anxiety disorder Mother    • Heart disease Father    • Cancer Father 40        Oral cancer   • Asthma Daughter    • Autoimmune disease Daughter    • No Known Problems Maternal Grandmother    • No Known Problems Maternal Grandfather    • No Known Problems Paternal Grandmother    • No Known Problems Paternal Grandfather    • Alcohol abuse Brother    • Completed Suicide  Brother    • Substance Abuse Brother    • Drug abuse Brother    • Psychiatric Illness Brother    • Schizophrenia Brother    • No Known Problems Brother    • Substance Abuse Son      Social History     Socioeconomic History   • Marital status: Single     Spouse name: None   • Number of children: None   • Years of education: None   • Highest education level: None   Occupational History   • None   Tobacco Use   • Smoking status: Former      "Current packs/day: 0.00     Average packs/day: 1 pack/day for 27.3 years (27.3 ttl pk-yrs)     Types: Cigarettes     Start date: 1982     Quit date: 2009     Years since quittin.9   • Smokeless tobacco: Never   Vaping Use   • Vaping status: Never Used   Substance and Sexual Activity   • Alcohol use: Not Currently   • Drug use: Yes     Frequency: 5.0 times per week     Types: Marijuana     Comment: medical marijuana   • Sexual activity: Not Currently     Partners: Male     Birth control/protection: Condom Male   Other Topics Concern   • None   Social History Narrative        2 children who are recovering addicts.     Daughter lives in [place] and son lives with her    Is a \"Universal \"     Social Determinants of Health     Financial Resource Strain: Low Risk  (2022)    Overall Financial Resource Strain (CARDIA)    • Difficulty of Paying Living Expenses: Not hard at all   Food Insecurity: Not on file   Transportation Needs: Unmet Transportation Needs (2022)    PRAPARE - Transportation    • Lack of Transportation (Medical): Yes    • Lack of Transportation (Non-Medical): Yes   Physical Activity: Not on file   Stress: Not on file   Social Connections: Not on file   Intimate Partner Violence: Not on file   Housing Stability: Not on file     Current Outpatient Medications on File Prior to Visit   Medication Sig   • ARIPiprazole (ABILIFY) 10 mg tablet Take 1 PO Q HS   • busPIRone (BUSPAR) 10 mg tablet Take 1 PO TID   • butalbital-acetaminophen-caffeine (FIORICET,ESGIC) -40 mg per tablet Take 30 tablets by mouth every 28 days   • cetirizine (ZyrTEC) 10 mg tablet Take 10 mg by mouth daily   • DULoxetine (CYMBALTA) 60 mg delayed release capsule Duloxetine 60m capsule in the morning   • hydroCHLOROthiazide 25 mg tablet Take 1 tablet (25 mg total) by mouth daily   • morphine 20 MG/5ML solution Take 20 mg by mouth 3 (three) times a day   • Movantik 25 MG tablet Take 25 mg by mouth " "in the morning   • ondansetron (ZOFRAN) 8 mg tablet Take 8 mg by mouth every 8 (eight) hours as needed for nausea or vomiting   • TiZANidine (ZANAFLEX) 4 MG capsule Take 4 mg by mouth every 8 (eight) hours   • topiramate (TOPAMAX) 25 mg tablet Take 100 mg by mouth 2 (two) times a day   • transdermal buprenorphine (BUTRANS) 20 mcg/hr PTWK TD patch Place 1 patch on the skin once a week   • traZODone (DESYREL) 100 mg tablet Take 1 to 1 1/2 PO HS PRN     Allergies   Allergen Reactions   • Baclofen Confusion     TIA   • Coconut Oil - Food Allergy Swelling   • Imitrex [Sumatriptan] Chest Pain     Immunization History   Administered Date(s) Administered   • COVID-19 PFIZER VACCINE 0.3 ML IM 04/07/2021, 04/27/2021   • COVID-19 Pfizer Vac BIVALENT Derrell-sucrose 12 Yr+ IM 09/20/2022   • Influenza, injectable, quadrivalent, preservative free 0.5 mL 11/27/2023   • Influenza, recombinant, quadrivalent,injectable, preservative free 09/27/2022       Objective     /60   Pulse 65   Temp (!) 97.2 °F (36.2 °C) (Tympanic)   Ht 5' 9\" (1.753 m)   Wt 80.6 kg (177 lb 12.8 oz)   SpO2 98%   BMI 26.26 kg/m²     Physical Exam  Vitals and nursing note reviewed.   Constitutional:       General: She is not in acute distress.     Appearance: Normal appearance. She is not ill-appearing, toxic-appearing or diaphoretic.   HENT:      Head: Normocephalic and atraumatic.   Eyes:      Conjunctiva/sclera: Conjunctivae normal.   Cardiovascular:      Rate and Rhythm: Normal rate and regular rhythm.   Pulmonary:      Effort: Pulmonary effort is normal.      Breath sounds: Normal breath sounds.   Abdominal:      General: Abdomen is flat. Bowel sounds are normal.      Palpations: Abdomen is soft.   Musculoskeletal:      Cervical back: Normal range of motion and neck supple.      Right lower leg: No edema.      Left lower leg: No edema.   Lymphadenopathy:      Cervical: No cervical adenopathy.   Skin:     General: Skin is warm and dry.      Findings: " No rash.   Neurological:      General: No focal deficit present.      Mental Status: She is alert and oriented to person, place, and time.   Psychiatric:         Mood and Affect: Mood normal.   Cecille Chavez,

## 2024-05-07 NOTE — PATIENT INSTRUCTIONS
Start hctz for your blood pressure  Wear compression stockings for the lower extremity swelling  See weight management  Get labs and return in 1 month for recheck of your edema  Schedule the lung cancer screening   
12-Dec-2022

## 2024-06-01 LAB
ALBUMIN SERPL-MCNC: 4 G/DL (ref 3.8–4.9)
ALBUMIN SERPL-MCNC: 4.2 G/DL (ref 3.8–4.9)
ALBUMIN/GLOB SERPL: 2.2 {RATIO} (ref 1.2–2.2)
ALP SERPL-CCNC: 76 IU/L (ref 44–121)
ALP SERPL-CCNC: 78 IU/L (ref 44–121)
ALT SERPL-CCNC: 19 IU/L (ref 0–32)
ALT SERPL-CCNC: 22 IU/L (ref 0–32)
AST SERPL-CCNC: 26 IU/L (ref 0–40)
AST SERPL-CCNC: 28 IU/L (ref 0–40)
BILIRUB DIRECT SERPL-MCNC: <0.1 MG/DL (ref 0–0.4)
BILIRUB SERPL-MCNC: 0.2 MG/DL (ref 0–1.2)
BILIRUB SERPL-MCNC: 0.3 MG/DL (ref 0–1.2)
BUN SERPL-MCNC: 11 MG/DL (ref 6–24)
BUN/CREAT SERPL: 15 (ref 9–23)
CALCIUM SERPL-MCNC: 9.3 MG/DL (ref 8.7–10.2)
CHLORIDE SERPL-SCNC: 101 MMOL/L (ref 96–106)
CHOLEST SERPL-MCNC: 246 MG/DL (ref 100–199)
CHOLEST/HDLC SERPL: 3.8 RATIO (ref 0–4.4)
CO2 SERPL-SCNC: 21 MMOL/L (ref 20–29)
CREAT SERPL-MCNC: 0.75 MG/DL (ref 0.57–1)
EGFR: 95 ML/MIN/1.73
EST. AVERAGE GLUCOSE BLD GHB EST-MCNC: 120 MG/DL
GLOBULIN SER-MCNC: 1.9 G/DL (ref 1.5–4.5)
GLUCOSE SERPL-MCNC: 97 MG/DL (ref 70–99)
HBA1C MFR BLD: 5.8 % (ref 4.8–5.6)
HCV AB S/CO SERPL IA: NON REACTIVE
HDLC SERPL-MCNC: 64 MG/DL
LDLC SERPL CALC-MCNC: 155 MG/DL (ref 0–99)
POTASSIUM SERPL-SCNC: 3.7 MMOL/L (ref 3.5–5.2)
PROT SERPL-MCNC: 6.1 G/DL (ref 6–8.5)
PROT SERPL-MCNC: 6.3 G/DL (ref 6–8.5)
SL AMB VLDL CHOLESTEROL CALC: 27 MG/DL (ref 5–40)
SODIUM SERPL-SCNC: 137 MMOL/L (ref 134–144)
TRIGL SERPL-MCNC: 149 MG/DL (ref 0–149)

## 2024-06-03 ENCOUNTER — TELEPHONE (OUTPATIENT)
Dept: FAMILY MEDICINE CLINIC | Facility: CLINIC | Age: 54
End: 2024-06-03

## 2024-06-03 NOTE — TELEPHONE ENCOUNTER
----- Message from Cecille Chavez DO sent at 6/3/2024 12:11 PM EDT -----  Let patient know that her repeat liver enzymes were normal.   A1c slightly elevated at 5.8. should limit sweets in diet as well as simple carbs.   Her cholesterol is also elevated. Follow low fat, low cholesterol diet for this.

## 2024-07-03 ENCOUNTER — TELEMEDICINE (OUTPATIENT)
Dept: PSYCHIATRY | Facility: CLINIC | Age: 54
End: 2024-07-03
Payer: COMMERCIAL

## 2024-07-03 DIAGNOSIS — F06.31 MOOD DISORDER DUE TO KNOWN PHYSIOLOGICAL CONDITION WITH DEPRESSIVE FEATURES: ICD-10-CM

## 2024-07-03 PROCEDURE — 99214 OFFICE O/P EST MOD 30 MIN: CPT | Performed by: STUDENT IN AN ORGANIZED HEALTH CARE EDUCATION/TRAINING PROGRAM

## 2024-07-05 RX ORDER — TRAZODONE HYDROCHLORIDE 100 MG/1
TABLET ORAL
Qty: 30 TABLET | Refills: 2 | Status: SHIPPED | OUTPATIENT
Start: 2024-07-05

## 2024-07-05 RX ORDER — ARIPIPRAZOLE 10 MG/1
TABLET ORAL
Qty: 90 TABLET | Refills: 3 | Status: SHIPPED | OUTPATIENT
Start: 2024-07-05

## 2024-07-05 RX ORDER — BUSPIRONE HYDROCHLORIDE 10 MG/1
TABLET ORAL
Qty: 90 TABLET | Refills: 3 | Status: SHIPPED | OUTPATIENT
Start: 2024-07-05

## 2024-07-05 RX ORDER — DULOXETIN HYDROCHLORIDE 60 MG/1
CAPSULE, DELAYED RELEASE ORAL
Qty: 90 CAPSULE | Refills: 3 | Status: SHIPPED | OUTPATIENT
Start: 2024-07-05

## 2024-07-06 NOTE — PSYCH
MEDICATION MANAGEMENT NOTE        [facility]      Name and Date of Birth:  Sanjana Mares 54 y.o. 1970 MRN: [id number]    Date of Visit: July 5, 2024    Reason for Visit: Follow-up visit for medication management     Virtual Visit Disclaimer:       TeleMed provider: Surekha Del Real D.O.    Location: [place]     Verification of patient location:     Patient is currently located in the [place] [facility]  Patient is currently located in a state in which I am licensed     After connecting through [employer], the patient was identified by name and date of birth.  Sanjana Mares was informed that this is a telemedicine visit that is being conducted through [employer], and the patient was informed that this is a secure, HIPAA-compliant platform. My office door was closed. No one else was in the room. Sanjana Mares acknowledged consent and understanding of privacy and security of the video platform. Sanjana understands that the online visit is based solely on information provided by the patient, and that, in the absence of a face-to-face physical evaluation by the physician, the diagnosis Sanjana  receives is both limited and provisional in terms of accuracy and completeness. Sanjana Mares understands that they can discontinue the visit at any time. I informed Sanjana that I have reviewed their record in EP[facility] and presented the opportunity for them to ask any questions regarding the visit today. Sanjana Mares voiced understanding and consented to these terms. Sanjana is aware this is a billable service. Sanjana is present at her home and primary address      SUBJECTIVE:    Sanjana Mares is a 54 y.o. female with past psychiatric history significant for MDD, SHAD who was personally seen and evaluated today at the [facility] outpatient clinic for follow-up and medication management. Sanjana denied SI, HI, AVH, delusions, cosme since her last appointment.  She  stated that she remains engaged with family, friends and personal hobbies and after discussion of risks, benefits, potential side effects, alternatives, patient wishes to continue on current regimen as is without any changes due to reported stability.  Patient stated that she has adopted a healthy lifestyle, lost weight and feels overall well.        Current Rating Scores:     None completed today.    Review Of Systems:      Constitutional negative   ENT negative   Cardiovascular negative   Respiratory negative   Gastrointestinal negative   Genitourinary negative   Musculoskeletal negative   Integumentary negative   Neurological negative   Endocrine negative   Other Symptoms none, all other systems are negative       Past Psychiatric History: (unchanged information from previous note copied and italicized) - Information that is bolded has been updated.     See intake    Substance Abuse History: (unchanged information from previous note copied and italicized) - Information that is bolded has been updated.     See intake    Social History: (unchanged information from previous note copied and italicized) - Information that is bolded has been updated.     See intake    Traumatic History: (unchanged information from previous note copied and italicized) - Information that is bolded has been updated.       See intake    Past Medical History:    Past Medical History:   Diagnosis Date    Allergic     Anemia     Anxiety     Asthma     allergy induced    Depression     Eating disorder     prior to her gastric bypass was an over-eater    Headache(784.0)     Iron deficiency     Migraine     Obesity     RSD (reflex sympathetic dystrophy)     Thalassemia minor     TIA (transient ischemic attack)     reaction to baclofen??        Past Surgical History:   Procedure Laterality Date     SECTION      ,     CHOLECYSTECTOMY      GASTRIC BYPASS N/A 2005    TONSILLECTOMY       Allergies   Allergen Reactions     "Baclofen Confusion     TIA    Coconut Oil - Food Allergy Swelling    Imitrex [Sumatriptan] Chest Pain       Substance Abuse History:    Social History     Substance and Sexual Activity   Alcohol Use Not Currently     Social History     Substance and Sexual Activity   Drug Use Yes    Frequency: 5.0 times per week    Types: Marijuana    Comment: medical marijuana       Social History:    Social History     Socioeconomic History    Marital status: Single     Spouse name: Not on file    Number of children: Not on file    Years of education: Not on file    Highest education level: Not on file   Occupational History    Not on file   Tobacco Use    Smoking status: Former     Current packs/day: 0.00     Average packs/day: 1 pack/day for 27.3 years (27.3 ttl pk-yrs)     Types: Cigarettes     Start date: 1/1/1982     Quit date: 5/1/2009     Years since quitting: 15.1    Smokeless tobacco: Never   Vaping Use    Vaping status: Never Used   Substance and Sexual Activity    Alcohol use: Not Currently    Drug use: Yes     Frequency: 5.0 times per week     Types: Marijuana     Comment: medical marijuana    Sexual activity: Not Currently     Partners: Male     Birth control/protection: Condom Male   Other Topics Concern    Not on file   Social History Narrative        2 children who are recovering addicts.     Daughter lives in [place] and son lives with her    Is a \"Universal \"     Social Determinants of Health     Financial Resource Strain: Low Risk  (9/27/2022)    Overall Financial Resource Strain (CARDIA)     Difficulty of Paying Living Expenses: Not hard at all   Food Insecurity: Not on file   Transportation Needs: Unmet Transportation Needs (9/27/2022)    PRAPARE - Transportation     Lack of Transportation (Medical): Yes     Lack of Transportation (Non-Medical): Yes   Physical Activity: Not on file   Stress: Not on file   Social Connections: Not on file   Intimate Partner Violence: Not on file   Housing " Stability: Not on file       Family Psychiatric History:     Family History   Problem Relation Age of Onset    Mental illness Mother     Depression Mother     Diabetes Mother         Actually this is moms mom    ADD / ADHD Mother     Anxiety disorder Mother     Heart disease Father     Cancer Father 40        Oral cancer    Asthma Daughter     Autoimmune disease Daughter     No Known Problems Maternal Grandmother     No Known Problems Maternal Grandfather     No Known Problems Paternal Grandmother     No Known Problems Paternal Grandfather     Alcohol abuse Brother     Completed Suicide  Brother     Substance Abuse Brother     Drug abuse Brother     Psychiatric Illness Brother     Schizophrenia Brother     No Known Problems Brother     Substance Abuse Son        History Review: The following portions of the patient's history were reviewed and updated as appropriate: allergies, current medications, past family history, past medical history, past social history, past surgical history, and problem list.         OBJECTIVE:     Vital signs in last 24 hours:    There were no vitals filed for this visit.    Mental Status Evaluation:    Appearance age appropriate, casually dressed   Behavior cooperative, calm   Speech normal rate, normal volume, normal pitch   Mood euthymic   Affect normal range and intensity, appropriate   Thought Processes organized, goal directed   Associations intact associations   Thought Content no overt delusions   Perceptual Disturbances: no auditory hallucinations, no visual hallucinations   Abnormal Thoughts  Risk Potential Suicidal ideation - None  Homicidal ideation - None  Potential for aggression - No   Orientation oriented to person, place, time/date, and situation   Memory recent and remote memory grossly intact   Consciousness alert and awake   Attention Span Concentration Span attention span and concentration are age appropriate   Intellect appears to be of average intelligence   Insight  intact   Judgement intact   Muscle Strength and  Gait unable to assess today due to virtual visit   Motor activity unable to assess today due to virtual visit   Language no difficulty naming common objects, no difficulty repeating a phrase   Fund of Knowledge adequate knowledge of current events  adequate fund of knowledge regarding past history  adequate fund of knowledge regarding vocabulary    Pain none   Pain Scale Did not ask patient to formally rate       Laboratory Results: I have personally reviewed all pertinent laboratory/tests results    Recent Labs (last 2 months):   No visits with results within 2 Month(s) from this visit.   Latest known visit with results is:   Office Visit on 03/22/2024   Component Date Value    White Blood Cell Count 03/28/2024 7.2     Red Blood Cell Count 03/28/2024 5.61 (H)     Hemoglobin 03/28/2024 11.8     HCT 03/28/2024 38.6     MCV 03/28/2024 69 (L)     MCH 03/28/2024 21.0 (L)     MCHC 03/28/2024 30.6 (L)     RDW 03/28/2024 15.9 (H)     Platelet Count 03/28/2024 386     Neutrophils 03/28/2024 40     Lymphocytes 03/28/2024 37     Monocytes 03/28/2024 7     Eosinophils 03/28/2024 15     Basophils PCT 03/28/2024 1     Neutrophils (Absolute) 03/28/2024 2.9     Lymphocytes (Absolute) 03/28/2024 2.6     Monocytes (Absolute) 03/28/2024 0.5     Eosinophils (Absolute) 03/28/2024 1.1 (H)     Basophils ABS 03/28/2024 0.1     Immature Granulocytes 03/28/2024 0     Immature Granulocytes (A* 03/28/2024 0.0     Glucose, Random 03/28/2024 112 (H)     BUN 03/28/2024 15     Creatinine 03/28/2024 0.86     eGFR 03/28/2024 80     SL AMB BUN/CREATININE RA* 03/28/2024 17     Sodium 03/28/2024 137     Potassium 03/28/2024 3.6     Chloride 03/28/2024 97     CO2 03/28/2024 23     CALCIUM 03/28/2024 9.3     Protein, Total 03/28/2024 7.0     Albumin 03/28/2024 4.5     Globulin, Total 03/28/2024 2.5     Albumin/Globulin Ratio 03/28/2024 1.8     TOTAL BILIRUBIN 03/28/2024 0.2     Alk Phos Isoenzymes  03/28/2024 147 (H)     AST 03/28/2024 40     ALT 03/28/2024 35 (H)     TSH 03/28/2024 2.240     Iron, Serum 03/28/2024 73     Ferritin 03/28/2024 66     Protein, Total 05/31/2024 6.3     Albumin 05/31/2024 4.0     TOTAL BILIRUBIN 05/31/2024 0.2     Bilirubin, Direct 05/31/2024 <0.10     Alk Phos Isoenzymes 05/31/2024 76     AST 05/31/2024 28     ALT 05/31/2024 19        Suicide/Homicide Risk Assessment:    Risk of Harm to Self:  The following ratings are based on assessment at the time of the interview  Historical Risk Factors include: chronic psychiatric problems  Protective Factors: no current suicidal ideation, compliant with medications, compliant with mental health treatment, good self-esteem, having a desire to live, stable living environment, strong relationships    Risk of Harm to Others:  The following ratings are based on assessment at the time of the interview  Historical Risk Factors include: none.  Protective Factors: no current homicidal ideation    The following interventions are recommended: contracts for safety at present - agrees to go to ED if feeling unsafe, contracts for safety at present - agrees to call Crisis Intervention Service if feeling unsafe      Lethality Statement:    Based on today's assessment and clinical criteria, Sanjana Mares contracts for safety and is not an imminent risk of harm to self or others. Outpatient level of care is deemed appropriate at this current time. Sanjana understands that if they can no longer contract for safety, they need to call the office or report to their nearest Emergency Room for immediate evaluation. They voiced understanding and agreement to call 911 or head to the nearest ED should they have any physical or mental decompensation whatsoever.       Assessment/Plan:     1.)  MDD  2.)  SHAD  3.)      After discussion of risks, benefits, and side effects, alternatives, we will continue on current regimen as is.  Regimen consists of Abilify 10 mg/day,  BuSpar 10 mg 3 times daily, Cymbalta 60 mg daily, trazodone 100 mg as needed for sleep.  Patient voices understanding and agreement to our crisis plan.      Medication management every 3 months  Aware of 24 hour and weekend coverage for urgent situations accessed by calling [facility] main practice number    Medications Risks/Benefits      Risks, Benefits And Possible Side Effects Of Medications:    Risks, benefits, and possible side effects of medications explained to Sanjana and she verbalizes understanding and agreement for treatment.    Controlled Medication Discussion:     Sanjana has been filling controlled prescriptions on time as prescribed according to [place] Prescription Drug Monitoring Program    Psychotherapy Provided:     Individual psychotherapy provided: Crisis/safety plan discussed with Sanjana.     Treatment Plan:    Completed and signed during the session:  We will complete at next appointment due to lack of time      Visit Time    Visit Start Time: 12:00 PM  Visit Stop Time: 12:10 PM  Total Visit Duration:  10 minutes     The total visit duration detailed above includes: patient engagement, medication management, psychotherapy/counseling, discussion regarding treatment goals, documentation, review of past medical records, and coordination of care.      Note Share Disclaimer:     This note was not shared with the patient due to reasonable likelihood of causing patient harm      Surekha Del Real DO  Psychiatry  07/05/24

## 2024-07-18 ENCOUNTER — HOSPITAL ENCOUNTER (OUTPATIENT)
Dept: MAMMOGRAPHY | Facility: IMAGING CENTER | Age: 54
Discharge: HOME | End: 2024-07-18
Payer: COMMERCIAL

## 2024-07-18 VITALS — WEIGHT: 153 LBS | HEIGHT: 69 IN | BODY MASS INDEX: 22.66 KG/M2

## 2024-07-18 DIAGNOSIS — Z12.31 ENCOUNTER FOR SCREENING MAMMOGRAM FOR BREAST CANCER: ICD-10-CM

## 2024-07-18 PROCEDURE — 77067 SCR MAMMO BI INCL CAD: CPT

## 2024-07-18 PROCEDURE — 77063 BREAST TOMOSYNTHESIS BI: CPT

## 2024-08-09 ENCOUNTER — PREP FOR PROCEDURE (OUTPATIENT)
Dept: GASTROENTEROLOGY | Facility: CLINIC | Age: 54
End: 2024-08-09

## 2024-08-09 ENCOUNTER — TELEPHONE (OUTPATIENT)
Dept: GASTROENTEROLOGY | Facility: CLINIC | Age: 54
End: 2024-08-09

## 2024-08-09 DIAGNOSIS — Z12.11 SCREENING FOR COLON CANCER: Primary | ICD-10-CM

## 2024-08-09 NOTE — TELEPHONE ENCOUNTER
Scheduled date of colonoscopy (as of today):8/23/24  Physician performing colonoscopy:ND  Location of colonoscopy:[facility]  Bowel prep reviewed with patient:2 Day Nayely  Instructions reviewed with patient by:Emailed to pt  Clearances: DERIC

## 2024-08-09 NOTE — TELEPHONE ENCOUNTER
08/09/24  Screened by: Edyta Montero    Referring Provider     Pre- Screening:     There is no height or weight on file to calculate BMI.  Has patient been referred for a routine screening Colonoscopy? no  Is the patient between 45-75 years old? yes      Previous Colonoscopy yes   If yes:    Date: 1/26/2024    Facility: [facility]    Reason: Screening      SCHEDULING STAFF: If the patient is between 45yrs-49yrs, please advise patient to confirm benefits/coverage with their insurance company for a routine screening colonoscopy, some insurance carriers will only cover at 50yrs or older. If the patient is over 75years old, please schedule an office visit.     Does the patient want to see a Gastroenterologist prior to their procedure OR are they having any GI symptoms? no    Has the patient been hospitalized or had abdominal surgery in the past 6 months? no    Does the patient use supplemental oxygen? no    Does the patient take Coumadin, Lovenox, Plavix, Elliquis, Xarelto, or other blood thinning medication? no    Has the patient had a stroke, cardiac event, or stent placed in the past year? no    SCHEDULING STAFF: If patient answers NO to above questions, then schedule procedure. If patient answers YES to above questions, then schedule office appointment.     If patient is between 45yrs - 49yrs, please advise patient that we will have to confirm benefits & coverage with their insurance company for a routine screening colonoscopy.

## 2024-08-11 DIAGNOSIS — Z12.11 SCREENING FOR COLON CANCER: Primary | ICD-10-CM

## 2024-08-12 DIAGNOSIS — Z12.11 SCREENING FOR COLON CANCER: Primary | ICD-10-CM

## 2024-08-23 ENCOUNTER — HOSPITAL ENCOUNTER (OUTPATIENT)
Dept: GASTROENTEROLOGY | Facility: AMBULATORY SURGERY CENTER | Age: 54
Discharge: HOME | End: 2024-08-23
Attending: STUDENT IN AN ORGANIZED HEALTH CARE EDUCATION/TRAINING PROGRAM
Payer: COMMERCIAL

## 2024-08-23 ENCOUNTER — ANESTHESIA (OUTPATIENT)
Dept: GASTROENTEROLOGY | Facility: AMBULATORY SURGERY CENTER | Age: 54
End: 2024-08-23

## 2024-08-23 ENCOUNTER — ANESTHESIA EVENT (OUTPATIENT)
Dept: GASTROENTEROLOGY | Facility: AMBULATORY SURGERY CENTER | Age: 54
End: 2024-08-23

## 2024-08-23 VITALS
HEIGHT: 69 IN | WEIGHT: 146 LBS | OXYGEN SATURATION: 100 % | DIASTOLIC BLOOD PRESSURE: 63 MMHG | HEART RATE: 62 BPM | TEMPERATURE: 97.3 F | RESPIRATION RATE: 17 BRPM | SYSTOLIC BLOOD PRESSURE: 117 MMHG | BODY MASS INDEX: 21.62 KG/M2

## 2024-08-23 DIAGNOSIS — Z12.11 SCREENING FOR COLON CANCER: ICD-10-CM

## 2024-08-23 DIAGNOSIS — I10 ESSENTIAL HYPERTENSION: ICD-10-CM

## 2024-08-23 PROCEDURE — 45378 DIAGNOSTIC COLONOSCOPY: CPT | Performed by: STUDENT IN AN ORGANIZED HEALTH CARE EDUCATION/TRAINING PROGRAM

## 2024-08-23 RX ORDER — SODIUM CHLORIDE, SODIUM LACTATE, POTASSIUM CHLORIDE, CALCIUM CHLORIDE 600; 310; 30; 20 MG/100ML; MG/100ML; MG/100ML; MG/100ML
50 INJECTION, SOLUTION INTRAVENOUS CONTINUOUS
Status: DISCONTINUED | OUTPATIENT
Start: 2024-08-23 | End: 2024-08-27 | Stop reason: HOSPADM

## 2024-08-23 RX ORDER — PROPOFOL 10 MG/ML
INJECTION, EMULSION INTRAVENOUS AS NEEDED
Status: DISCONTINUED | OUTPATIENT
Start: 2024-08-23 | End: 2024-08-23

## 2024-08-23 RX ADMIN — PROPOFOL 100 MG: 10 INJECTION, EMULSION INTRAVENOUS at 13:52

## 2024-08-23 RX ADMIN — SODIUM CHLORIDE, SODIUM LACTATE, POTASSIUM CHLORIDE, CALCIUM CHLORIDE 50 ML/HR: 600; 310; 30; 20 INJECTION, SOLUTION INTRAVENOUS at 13:44

## 2024-08-23 RX ADMIN — PROPOFOL 100 MG: 10 INJECTION, EMULSION INTRAVENOUS at 13:49

## 2024-08-23 RX ADMIN — SODIUM CHLORIDE, SODIUM LACTATE, POTASSIUM CHLORIDE, CALCIUM CHLORIDE: 600; 310; 30; 20 INJECTION, SOLUTION INTRAVENOUS at 13:45

## 2024-08-23 RX ADMIN — PROPOFOL 100 MG: 10 INJECTION, EMULSION INTRAVENOUS at 13:56

## 2024-08-23 RX ADMIN — SODIUM CHLORIDE, SODIUM LACTATE, POTASSIUM CHLORIDE, CALCIUM CHLORIDE: 600; 310; 30; 20 INJECTION, SOLUTION INTRAVENOUS at 14:10

## 2024-08-23 RX ADMIN — PROPOFOL 100 MG: 10 INJECTION, EMULSION INTRAVENOUS at 14:01

## 2024-08-23 NOTE — H&P
History and Physical - [facility]  Sanjana Mares 54 y.o. female MRN: [id number]    HPI: Sanjana Mares is a 54 y.o. female who presents for screening colonoscopy.    REVIEW OF SYSTEMS: Per the HPI, and otherwise unremarkable.    Historical Information   Past Medical History:   Diagnosis Date    Allergic     Anemia     Anxiety     Asthma     allergy induced    Depression     Eating disorder     prior to her gastric bypass was an over-eater    Headache(784.0)     Iron deficiency     Migraine     Obesity     RSD (reflex sympathetic dystrophy)     Thalassemia minor     TIA (transient ischemic attack)     reaction to baclofen??     Past Surgical History:   Procedure Laterality Date     SECTION      ,     CHOLECYSTECTOMY      GASTRIC BYPASS N/A 2005    TONSILLECTOMY       Social History   Social History     Substance and Sexual Activity   Alcohol Use Not Currently     Social History     Substance and Sexual Activity   Drug Use Yes    Frequency: 5.0 times per week    Types: Marijuana    Comment: medical marijuana     Social History     Tobacco Use   Smoking Status Former    Current packs/day: 0.00    Average packs/day: 1 pack/day for 27.3 years (27.3 ttl pk-yrs)    Types: Cigarettes    Start date: 1982    Quit date: 2009    Years since quitting: 15.3   Smokeless Tobacco Never     Family History   Problem Relation Age of Onset    Mental illness Mother     Depression Mother     Diabetes Mother         Actually this is moms mom    ADD / ADHD Mother     Anxiety disorder Mother     Heart disease Father     Cancer Father 40        Oral cancer    Asthma Daughter     Autoimmune disease Daughter     No Known Problems Maternal Grandmother     No Known Problems Maternal Grandfather     No Known Problems Paternal Grandmother     No Known Problems Paternal Grandfather     Alcohol abuse Brother     Completed Suicide  Brother     Substance Abuse Brother     Drug abuse Brother      Psychiatric Illness Brother     Schizophrenia Brother     No Known Problems Brother     Substance Abuse Son        Meds/Allergies       Current Outpatient Medications:     ARIPiprazole (ABILIFY) 10 mg tablet    busPIRone (BUSPAR) 10 mg tablet    cetirizine (ZyrTEC) 10 mg tablet    DULoxetine (CYMBALTA) 60 mg delayed release capsule    hydroCHLOROthiazide 25 mg tablet    morphine 20 MG/5ML solution    Movantik 25 MG tablet    ondansetron (ZOFRAN) 8 mg tablet    TiZANidine (ZANAFLEX) 4 MG capsule    topiramate (TOPAMAX) 25 mg tablet    transdermal buprenorphine (BUTRANS) 20 mcg/hr PTWK TD patch    traZODone (DESYREL) 100 mg tablet    butalbital-acetaminophen-caffeine (FIORICET,ESGIC) -40 mg per tablet    polyethylene glycol (GOLYTELY) 4000 mL solution    polyethylene glycol (GOLYTELY) 4000 mL solution    polyethylene glycol (GOLYTELY) 4000 mL solution    polyethylene glycol (GOLYTELY) 4000 mL solution    Current Facility-Administered Medications:     lactated ringers infusion, 50 mL/hr, Intravenous, Continuous    Allergies   Allergen Reactions    Baclofen Confusion     TIA    Coconut Oil - Food Allergy Swelling    Imitrex [Sumatriptan] Chest Pain       Objective     There were no vitals taken for this visit.    PHYSICAL EXAM    General Appearance: NAD, cooperative, alert  Eyes: Anicteric  GI:  Soft, non-tender, non-distended; normal bowel sounds; no masses, no organomegaly   Rectal: Deferred until procedure  Musculoskeletal: No edema.  Skin:  No jaundice    ASSESSMENT/PLAN:  This is a 54 y.o. year old female here for colonoscopy, and she is stable and optimized for her procedure.

## 2024-08-23 NOTE — ANESTHESIA PREPROCEDURE EVALUATION
Procedure:  COLONOSCOPY    Relevant Problems   ANESTHESIA (within normal limits)      CARDIO   (+) Essential hypertension   (+) Migraine without aura   (+) Mixed hyperlipidemia      HEMATOLOGY   (+) Thalassemia minor      NEURO/PSYCH   (+) Anxiety   (+) Chronic pain syndrome   (+) Continuous opioid dependence (HCC)   (+) Depression, recurrent (HCC)   (+) Migraine without aura   (+) RSD (reflex sympathetic dystrophy)      Surgery/Wound/Pain   (+) History of gastric bypass        Physical Exam    Airway    Mallampati score: I  TM Distance: >3 FB  Neck ROM: full     Dental   No notable dental hx     Cardiovascular  Cardiovascular exam normal    Pulmonary  Pulmonary exam normal     Other Findings  post-pubertal.      Anesthesia Plan  ASA Score- 2     Anesthesia Type- IV sedation with anesthesia with ASA Monitors.         Additional Monitors:     Airway Plan:     Comment: I discussed risks (reviewed with patient on the anesthesia consent form), benefits and alternatives of monitored sedation including the possibility under sedation to have recall or mild discomfort.  .       Plan Factors-    Chart reviewed.    Patient summary reviewed.                  Induction- intravenous.    Postoperative Plan-         Informed Consent- Anesthetic plan and risks discussed with patient.  I personally reviewed this patient with the CRNA. Discussed and agreed on the Anesthesia Plan with the CRNA..

## 2024-08-23 NOTE — ANESTHESIA POSTPROCEDURE EVALUATION
Post-Op Assessment Note    CV Status:  Stable  Pain Score: 0    Pain management: adequate       Mental Status:  Alert and awake   Hydration Status:  Euvolemic   PONV Controlled:  Controlled   Airway Patency:  Patent     Post Op Vitals Reviewed: Yes    No anethesia notable event occurred.    Staff: CRNA               BP   110/67   Temp   98   Pulse  85   Resp   16   SpO2   99

## 2024-08-24 RX ORDER — HYDROCHLOROTHIAZIDE 25 MG/1
25 TABLET ORAL DAILY
Qty: 30 TABLET | Refills: 5 | Status: SHIPPED | OUTPATIENT
Start: 2024-08-24

## 2024-10-03 ENCOUNTER — TELEMEDICINE (OUTPATIENT)
Dept: PSYCHIATRY | Facility: CLINIC | Age: 54
End: 2024-10-03
Payer: COMMERCIAL

## 2024-10-03 DIAGNOSIS — F41.1 GENERALIZED ANXIETY DISORDER: Primary | ICD-10-CM

## 2024-10-03 PROCEDURE — 99213 OFFICE O/P EST LOW 20 MIN: CPT | Performed by: STUDENT IN AN ORGANIZED HEALTH CARE EDUCATION/TRAINING PROGRAM

## 2024-10-05 NOTE — PSYCH
MEDICATION MANAGEMENT NOTE        [facility]      Name and Date of Birth:  Sanjana Mares 54 y.o. 1970 MRN: [id number]    Date of Visit: October 5, 2024    Reason for Visit: Follow-up visit for medication management     Virtual Visit Disclaimer:       TeleMed provider: Surekha Del Real D.O.    Location: [place]     Verification of patient location:     Patient is currently located in the [facility]  Patient is currently located in a state in which I am licensed     After connecting through [employer], the patient was identified by name and date of birth.  Sanjana Mares was informed that this is a telemedicine visit that is being conducted through [employer], and the patient was informed that this is a secure, HIPAA-compliant platform. My office door was closed. No one else was in the room. Sanjana Mares acknowledged consent and understanding of privacy and security of the video platform. Sanjana understands that the online visit is based solely on information provided by the patient, and that, in the absence of a face-to-face physical evaluation by the physician, the diagnosis Sanjana  receives is both limited and provisional in terms of accuracy and completeness. Sanjana Mares understands that they can discontinue the visit at any time. I informed Sanjana that I have reviewed their record in EP[facility] and presented the opportunity for them to ask any questions regarding the visit today. Sanjana Mares voiced understanding and consented to these terms. Sanjana is aware this is a billable service. Sanjana is present at her home and primary address      SUBJECTIVE:    Sanjana Mares is a 54 y.o. female with past psychiatric history significant for MDD, SHAD who was personally seen and evaluated today at the [facility] outpatient clinic for follow-up and medication management. Sanjana denied SI, HI, AVH, delusions, cosme since her last appointment.  She  stated that she remains engaged with family, friends and personal hobbies and after discussion of risks, benefits, potential side effects, alternatives, patient wishes to titrate down her Abilify because she feels as if she is doing very well.  She also admits that she discontinued buspirone without informing this provider.  Patient will continue to take current dose of duloxetine.  She denies acute mental complaints or concerns at this time        Current Rating Scores:     None completed today.    Review Of Systems:      Constitutional negative   ENT negative   Cardiovascular negative   Respiratory negative   Gastrointestinal negative   Genitourinary negative   Musculoskeletal negative   Integumentary negative   Neurological negative   Endocrine negative   Other Symptoms none, all other systems are negative       Past Psychiatric History: (unchanged information from previous note copied and italicized) - Information that is bolded has been updated.     See intake    Substance Abuse History: (unchanged information from previous note copied and italicized) - Information that is bolded has been updated.     See intake    Social History: (unchanged information from previous note copied and italicized) - Information that is bolded has been updated.     See intake    Traumatic History: (unchanged information from previous note copied and italicized) - Information that is bolded has been updated.       See intake    Past Medical History:    Past Medical History:   Diagnosis Date    Allergic     Anemia     Anxiety     Asthma     allergy induced    Depression     Eating disorder     prior to her gastric bypass was an over-eater    Headache(784.0)     Iron deficiency     Migraine     Obesity     RSD (reflex sympathetic dystrophy)     Thalassemia minor     TIA (transient ischemic attack)     reaction to baclofen??        Past Surgical History:   Procedure Laterality Date     SECTION      ,     CHOLECYSTECTOMY  " 1995    GASTRIC BYPASS N/A 09/21/2005    TONSILLECTOMY       Allergies   Allergen Reactions    Baclofen Confusion     TIA    Coconut Oil - Food Allergy Swelling    Imitrex [Sumatriptan] Chest Pain       Substance Abuse History:    Social History     Substance and Sexual Activity   Alcohol Use Not Currently     Social History     Substance and Sexual Activity   Drug Use Yes    Frequency: 5.0 times per week    Types: Marijuana    Comment: medical marijuana       Social History:    Social History     Socioeconomic History    Marital status: Single     Spouse name: Not on file    Number of children: Not on file    Years of education: Not on file    Highest education level: Not on file   Occupational History    Not on file   Tobacco Use    Smoking status: Former     Current packs/day: 0.00     Average packs/day: 1 pack/day for 27.3 years (27.3 ttl pk-yrs)     Types: Cigarettes     Start date: 1/1/1982     Quit date: 5/1/2009     Years since quitting: 15.4    Smokeless tobacco: Never   Vaping Use    Vaping status: Never Used   Substance and Sexual Activity    Alcohol use: Not Currently    Drug use: Yes     Frequency: 5.0 times per week     Types: Marijuana     Comment: medical marijuana    Sexual activity: Not Currently     Partners: Male     Birth control/protection: Condom Male   Other Topics Concern    Not on file   Social History Narrative        2 children who are recovering addicts.     Daughter lives in [place] and son lives with her    Is a \"[employer] \"     Social Determinants of Health     Financial Resource Strain: Low Risk  (9/27/2022)    Overall Financial Resource Strain (CARDIA)     Difficulty of Paying Living Expenses: Not hard at all   Food Insecurity: Not on file   Transportation Needs: Unmet Transportation Needs (9/27/2022)    PRAPARE - Transportation     Lack of Transportation (Medical): Yes     Lack of Transportation (Non-Medical): Yes   Physical Activity: Not on file   Stress: Not on " file   Social Connections: Not on file   Intimate Partner Violence: Not on file   Housing Stability: Not on file       Family Psychiatric History:     Family History   Problem Relation Age of Onset    Mental illness Mother     Depression Mother     Diabetes Mother         Actually this is moms mom    ADD / ADHD Mother     Anxiety disorder Mother     Heart disease Father     Cancer Father 40        Oral cancer    Asthma Daughter     Autoimmune disease Daughter     No Known Problems Maternal Grandmother     No Known Problems Maternal Grandfather     No Known Problems Paternal Grandmother     No Known Problems Paternal Grandfather     Alcohol abuse Brother     Completed Suicide  Brother     Substance Abuse Brother     Drug abuse Brother     Psychiatric Illness Brother     Schizophrenia Brother     No Known Problems Brother     Substance Abuse Son        History Review: The following portions of the patient's history were reviewed and updated as appropriate: allergies, current medications, past family history, past medical history, past social history, past surgical history, and problem list.         OBJECTIVE:     Vital signs in last 24 hours:    There were no vitals filed for this visit.    Mental Status Evaluation:    Appearance age appropriate, casually dressed   Behavior cooperative, calm   Speech normal rate, normal volume, normal pitch   Mood euthymic   Affect normal range and intensity, appropriate   Thought Processes organized, goal directed   Associations intact associations   Thought Content no overt delusions   Perceptual Disturbances: no auditory hallucinations, no visual hallucinations   Abnormal Thoughts  Risk Potential Suicidal ideation - None  Homicidal ideation - None  Potential for aggression - No   Orientation oriented to person, place, time/date, and situation   Memory recent and remote memory grossly intact   Consciousness alert and awake   Attention Span Concentration Span attention span and  concentration are age appropriate   Intellect appears to be of average intelligence   Insight intact   Judgement intact   Muscle Strength and  Gait unable to assess today due to virtual visit   Motor activity unable to assess today due to virtual visit   Language no difficulty naming common objects, no difficulty repeating a phrase   Fund of Knowledge adequate knowledge of current events  adequate fund of knowledge regarding past history  adequate fund of knowledge regarding vocabulary    Pain none   Pain Scale Did not ask patient to formally rate       Laboratory Results: I have personally reviewed all pertinent laboratory/tests results    Recent Labs (last 2 months):   No visits with results within 2 Month(s) from this visit.   Latest known visit with results is:   Office Visit on 03/22/2024   Component Date Value    White Blood Cell Count 03/28/2024 7.2     Red Blood Cell Count 03/28/2024 5.61 (H)     Hemoglobin 03/28/2024 11.8     HCT 03/28/2024 38.6     MCV 03/28/2024 69 (L)     MCH 03/28/2024 21.0 (L)     MCHC 03/28/2024 30.6 (L)     RDW 03/28/2024 15.9 (H)     Platelet Count 03/28/2024 386     Neutrophils 03/28/2024 40     Lymphocytes 03/28/2024 37     Monocytes 03/28/2024 7     Eosinophils 03/28/2024 15     Basophils PCT 03/28/2024 1     Neutrophils (Absolute) 03/28/2024 2.9     Lymphocytes (Absolute) 03/28/2024 2.6     Monocytes (Absolute) 03/28/2024 0.5     Eosinophils (Absolute) 03/28/2024 1.1 (H)     Basophils ABS 03/28/2024 0.1     Immature Granulocytes 03/28/2024 0     Immature Granulocytes (A* 03/28/2024 0.0     Glucose, Random 03/28/2024 112 (H)     BUN 03/28/2024 15     Creatinine 03/28/2024 0.86     eGFR 03/28/2024 80     SL AMB BUN/CREATININE RA* 03/28/2024 17     Sodium 03/28/2024 137     Potassium 03/28/2024 3.6     Chloride 03/28/2024 97     CO2 03/28/2024 23     CALCIUM 03/28/2024 9.3     Protein, Total 03/28/2024 7.0     Albumin 03/28/2024 4.5     Globulin, Total 03/28/2024 2.5      Albumin/Globulin Ratio 03/28/2024 1.8     TOTAL BILIRUBIN 03/28/2024 0.2     Alk Phos Isoenzymes 03/28/2024 147 (H)     AST 03/28/2024 40     ALT 03/28/2024 35 (H)     TSH 03/28/2024 2.240     Iron, Serum 03/28/2024 73     Ferritin 03/28/2024 66     Protein, Total 05/31/2024 6.3     Albumin 05/31/2024 4.0     TOTAL BILIRUBIN 05/31/2024 0.2     Bilirubin, Direct 05/31/2024 <0.10     Alk Phos Isoenzymes 05/31/2024 76     AST 05/31/2024 28     ALT 05/31/2024 19        Suicide/Homicide Risk Assessment:    Risk of Harm to Self:  The following ratings are based on assessment at the time of the interview  Historical Risk Factors include: chronic psychiatric problems  Protective Factors: no current suicidal ideation, compliant with medications, compliant with mental health treatment, good self-esteem, having a desire to live, stable living environment, strong relationships    Risk of Harm to Others:  The following ratings are based on assessment at the time of the interview  Historical Risk Factors include: none.  Protective Factors: no current homicidal ideation    The following interventions are recommended: contracts for safety at present - agrees to go to ED if feeling unsafe, contracts for safety at present - agrees to call Crisis Intervention Service if feeling unsafe      Lethality Statement:    Based on today's assessment and clinical criteria, Sanjana Mares contracts for safety and is not an imminent risk of harm to self or others. Outpatient level of care is deemed appropriate at this current time. Sanjana understands that if they can no longer contract for safety, they need to call the office or report to their nearest Emergency Room for immediate evaluation. They voiced understanding and agreement to call 911 or head to the nearest ED should they have any physical or mental decompensation whatsoever.       Assessment/Plan:     1.)  MDD  2.)  SHAD  3.)      After discussion of risks, benefits, and side effects,  alternatives, we will continue duloxetine 60 mg daily and trazodone 100 to 150 mg nightly as needed for insomnia at current doses.  Patient has already self-discontinued buspirone without any worsening of mood.  Patient will take Abilify 5 mg daily for the next month and then discontinue it thereafter until our next appointment.  This is because patient believes that she is doing very well mood wise and does not require extra psychotropics.      Medication management every 3 months  Aware of 24 hour and weekend coverage for urgent situations accessed by calling [facility] main practice number    Medications Risks/Benefits      Risks, Benefits And Possible Side Effects Of Medications:    Risks, benefits, and possible side effects of medications explained to Sanjana and she verbalizes understanding and agreement for treatment.    Controlled Medication Discussion:     Sanjana has been filling controlled prescriptions on time as prescribed according to [place] Prescription Drug Monitoring Program    Psychotherapy Provided:     Individual psychotherapy provided: Crisis/safety plan discussed with Sanjana.     Treatment Plan:    Completed and signed during the session:  We will complete at next appointment due to lack of time      Visit Time    Visit Start Time: 11:30 AM  Visit Stop Time: 11:45 AM  Total Visit Duration:  15 minutes     The total visit duration detailed above includes: patient engagement, medication management, psychotherapy/counseling, discussion regarding treatment goals, documentation, review of past medical records, and coordination of care.      Note Share Disclaimer:     This note was not shared with the patient due to reasonable likelihood of causing patient harm      Surekha Del Real DO  Psychiatry  10/05/24

## 2024-10-08 ENCOUNTER — TELEPHONE (OUTPATIENT)
Dept: PSYCHIATRY | Facility: CLINIC | Age: 54
End: 2024-10-08

## 2024-10-14 ENCOUNTER — TELEPHONE (OUTPATIENT)
Age: 54
End: 2024-10-14

## 2024-10-14 NOTE — TELEPHONE ENCOUNTER
Patient called office requesting to schedule an appt due to having a lot of stress and needing to speak to provider. Writer scheduled patient for 10/17/24 at 8:30am virtual. Patient is also requesting a call to discuss stress. Writer informed patient the message would be sent.

## 2024-10-14 NOTE — TELEPHONE ENCOUNTER
This appears to be a vastly different presentation than my previous appointments given that she has had 3 mo fu for the last few visits due to feeling perfectly well. I will not prescribe benzodiazapines at this time. We were in the process of tapering her off of her medications at her request due to her feeling well enough not to need as many psychotropics. If she's only sleeping that little and feeling extremely stressed, she should call crisis if in an emergency. If not in a crisis, she will wait until our appointment so we may discuss risks benefits of various medication options.
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
## 2024-10-14 NOTE — TELEPHONE ENCOUNTER
"Nurse spoke with aSnjana Mares - reviewed response from clinician. Sanjana Mares verbalized understanding of same.    \"I am not in crisis and will wait for my appointment. I will reach out to crisis if needed.\"   Sanjana thanked nurse for the call.   "

## 2024-10-17 ENCOUNTER — TELEMEDICINE (OUTPATIENT)
Dept: PSYCHIATRY | Facility: CLINIC | Age: 54
End: 2024-10-17
Payer: COMMERCIAL

## 2024-10-17 DIAGNOSIS — F06.31 MOOD DISORDER DUE TO KNOWN PHYSIOLOGICAL CONDITION WITH DEPRESSIVE FEATURES: ICD-10-CM

## 2024-10-17 DIAGNOSIS — F41.1 GENERALIZED ANXIETY DISORDER: Primary | ICD-10-CM

## 2024-10-17 DIAGNOSIS — G47.00 INSOMNIA, UNSPECIFIED TYPE: ICD-10-CM

## 2024-10-17 PROCEDURE — 99214 OFFICE O/P EST MOD 30 MIN: CPT | Performed by: STUDENT IN AN ORGANIZED HEALTH CARE EDUCATION/TRAINING PROGRAM

## 2024-10-17 RX ORDER — DOXEPIN HYDROCHLORIDE 10 MG/1
10 CAPSULE ORAL
Qty: 14 CAPSULE | Refills: 0 | Status: SHIPPED | OUTPATIENT
Start: 2024-10-17 | End: 2024-10-31

## 2024-10-17 RX ORDER — BUSPIRONE HYDROCHLORIDE 30 MG/1
30 TABLET ORAL 2 TIMES DAILY
Qty: 180 TABLET | Refills: 0 | Status: SHIPPED | OUTPATIENT
Start: 2024-10-17 | End: 2025-01-15

## 2024-10-22 ENCOUNTER — TELEPHONE (OUTPATIENT)
Dept: PSYCHIATRY | Facility: CLINIC | Age: 54
End: 2024-10-22

## 2024-10-24 ENCOUNTER — TELEPHONE (OUTPATIENT)
Age: 54
End: 2024-10-24

## 2024-10-24 NOTE — TELEPHONE ENCOUNTER
Patient contacted the office to schedule a follow up visit with provider. Patient is now scheduled for 10/31  at 10:30 virtually.

## 2024-10-24 NOTE — PSYCH
MEDICATION MANAGEMENT NOTE        [facility]      Name and Date of Birth:  Sanjana Mares 54 y.o. 1970 MRN: [id number]    Date of Visit: October 23, 2024    Reason for Visit: Follow-up visit for medication management     Virtual Visit Disclaimer:       TeleMed provider: Surekha eDl Real D.O.    Location: [place]     Verification of patient location:     Patient is currently located in the [facility]  Patient is currently located in a state in which I am licensed     After connecting through [employer], the patient was identified by name and date of birth.  Sanjana Mares was informed that this is a telemedicine visit that is being conducted through [employer], and the patient was informed that this is a secure, HIPAA-compliant platform. My office door was closed. No one else was in the room. Sanjana Mares acknowledged consent and understanding of privacy and security of the video platform. Sanjana understands that the online visit is based solely on information provided by the patient, and that, in the absence of a face-to-face physical evaluation by the physician, the diagnosis Sanjana  receives is both limited and provisional in terms of accuracy and completeness. Sanjana Mares understands that they can discontinue the visit at any time. I informed Sanjana that I have reviewed their record in [facility] and presented the opportunity for them to ask any questions regarding the visit today. Sanjana Mares voiced understanding and consented to these terms. Sanjana is aware this is a billable service. Sanjana is present at her home and primary address      SUBJECTIVE:    Sanjana Mares is a 54 y.o. female with past psychiatric history significant for MDD, SHAD who was personally seen and evaluated today at the [facility] outpatient clinic for follow-up and medication management. Sanjana denied SI, HI, AVH, delusions, cosme since her last appointment.   However, she stated that due to some family conflict, she had felt more depressed and anxious for a few days since her last appointment but had started to feel better now.  After discussion of risks, benefits, potential side effects, alternatives, we will initiate doxepin 10 mg nightly as needed for her insomnia and increase BuSpar to 30 mg twice daily for her anxiety.  Rest of regimen remains the same with duloxetine 60 mg daily.  Patient denies acute mental complaints or concerns at this time      Current Rating Scores:     None completed today.    Review Of Systems:      Constitutional negative   ENT negative   Cardiovascular negative   Respiratory negative   Gastrointestinal negative   Genitourinary negative   Musculoskeletal negative   Integumentary negative   Neurological negative   Endocrine negative   Other Symptoms none, all other systems are negative       Past Psychiatric History: (unchanged information from previous note copied and italicized) - Information that is bolded has been updated.     See intake    Substance Abuse History: (unchanged information from previous note copied and italicized) - Information that is bolded has been updated.     See intake    Social History: (unchanged information from previous note copied and italicized) - Information that is bolded has been updated.     See intake    Traumatic History: (unchanged information from previous note copied and italicized) - Information that is bolded has been updated.       See intake    Past Medical History:    Past Medical History:   Diagnosis Date    Allergic     Anemia     Anxiety     Asthma     allergy induced    Depression     Eating disorder     prior to her gastric bypass was an over-eater    Headache(784.0)     Iron deficiency     Migraine     Obesity     RSD (reflex sympathetic dystrophy)     Thalassemia minor     TIA (transient ischemic attack)     reaction to baclofen??        Past Surgical History:   Procedure Laterality Date     " SECTION      ,     CHOLECYSTECTOMY      GASTRIC BYPASS N/A 2005    TONSILLECTOMY       Allergies   Allergen Reactions    Baclofen Confusion     TIA    Coconut Oil - Food Allergy Swelling    Imitrex [Sumatriptan] Chest Pain       Substance Abuse History:    Social History     Substance and Sexual Activity   Alcohol Use Not Currently     Social History     Substance and Sexual Activity   Drug Use Yes    Frequency: 5.0 times per week    Types: Marijuana    Comment: medical marijuana       Social History:    Social History     Socioeconomic History    Marital status: Single     Spouse name: Not on file    Number of children: Not on file    Years of education: Not on file    Highest education level: Not on file   Occupational History    Not on file   Tobacco Use    Smoking status: Former     Current packs/day: 0.00     Average packs/day: 1 pack/day for 27.3 years (27.3 ttl pk-yrs)     Types: Cigarettes     Start date: 1982     Quit date: 2009     Years since quitting: 15.4    Smokeless tobacco: Never   Vaping Use    Vaping status: Never Used   Substance and Sexual Activity    Alcohol use: Not Currently    Drug use: Yes     Frequency: 5.0 times per week     Types: Marijuana     Comment: medical marijuana    Sexual activity: Not Currently     Partners: Male     Birth control/protection: Condom Male   Other Topics Concern    Not on file   Social History Narrative        2 children who are recovering addicts.     Daughter lives in [place] and son lives with her    Is a \"Universal \"     Social Determinants of Health     Financial Resource Strain: Low Risk  (2022)    Overall Financial Resource Strain (CARDIA)     Difficulty of Paying Living Expenses: Not hard at all   Food Insecurity: Not on file   Transportation Needs: Unmet Transportation Needs (2022)    PRAPARE - Transportation     Lack of Transportation (Medical): Yes     Lack of Transportation (Non-Medical): Yes " "  Physical Activity: Not on file   Stress: Not on file   Social Connections: Not on file   Intimate Partner Violence: Not on file   Housing Stability: Not on file       Family Psychiatric History:     Family History   Problem Relation Age of Onset    Mental illness Mother     Depression Mother     Diabetes Mother         Actually this is moms mom    ADD / ADHD Mother     Anxiety disorder Mother     Heart disease Father     Cancer Father 40        Oral cancer    Asthma Daughter     Autoimmune disease Daughter     No Known Problems Maternal Grandmother     No Known Problems Maternal Grandfather     No Known Problems Paternal Grandmother     No Known Problems Paternal Grandfather     Alcohol abuse Brother     Completed Suicide  Brother     Substance Abuse Brother     Drug abuse Brother     Psychiatric Illness Brother     Schizophrenia Brother     No Known Problems Brother     Substance Abuse Son        History Review: The following portions of the patient's history were reviewed and updated as appropriate: allergies, current medications, past family history, past medical history, past social history, past surgical history, and problem list.         OBJECTIVE:     Vital signs in last 24 hours:    There were no vitals filed for this visit.    Mental Status Evaluation:    Appearance age appropriate, casually dressed   Behavior Cooperative, mildly anxious   Speech normal rate, normal volume, normal pitch   Mood \"Okay\"   Affect Constricted   Thought Processes organized, goal directed   Associations intact associations   Thought Content no overt delusions   Perceptual Disturbances: no auditory hallucinations, no visual hallucinations   Abnormal Thoughts  Risk Potential Suicidal ideation - None  Homicidal ideation - None  Potential for aggression - No   Orientation oriented to person, place, time/date, and situation   Memory recent and remote memory grossly intact   Consciousness alert and awake   Attention Span Concentration " Span attention span and concentration are age appropriate   Intellect appears to be of average intelligence   Insight intact   Judgement intact   Muscle Strength and  Gait unable to assess today due to virtual visit   Motor activity unable to assess today due to virtual visit   Language no difficulty naming common objects, no difficulty repeating a phrase   Fund of Knowledge adequate knowledge of current events  adequate fund of knowledge regarding past history  adequate fund of knowledge regarding vocabulary    Pain none   Pain Scale Did not ask patient to formally rate       Laboratory Results: I have personally reviewed all pertinent laboratory/tests results    Recent Labs (last 2 months):   No visits with results within 2 Month(s) from this visit.   Latest known visit with results is:   Office Visit on 03/22/2024   Component Date Value    White Blood Cell Count 03/28/2024 7.2     Red Blood Cell Count 03/28/2024 5.61 (H)     Hemoglobin 03/28/2024 11.8     HCT 03/28/2024 38.6     MCV 03/28/2024 69 (L)     MCH 03/28/2024 21.0 (L)     MCHC 03/28/2024 30.6 (L)     RDW 03/28/2024 15.9 (H)     Platelet Count 03/28/2024 386     Neutrophils 03/28/2024 40     Lymphocytes 03/28/2024 37     Monocytes 03/28/2024 7     Eosinophils 03/28/2024 15     Basophils PCT 03/28/2024 1     Neutrophils (Absolute) 03/28/2024 2.9     Lymphocytes (Absolute) 03/28/2024 2.6     Monocytes (Absolute) 03/28/2024 0.5     Eosinophils (Absolute) 03/28/2024 1.1 (H)     Basophils ABS 03/28/2024 0.1     Immature Granulocytes 03/28/2024 0     Immature Granulocytes (A* 03/28/2024 0.0     Glucose, Random 03/28/2024 112 (H)     BUN 03/28/2024 15     Creatinine 03/28/2024 0.86     eGFR 03/28/2024 80     SL AMB BUN/CREATININE RA* 03/28/2024 17     Sodium 03/28/2024 137     Potassium 03/28/2024 3.6     Chloride 03/28/2024 97     CO2 03/28/2024 23     CALCIUM 03/28/2024 9.3     Protein, Total 03/28/2024 7.0     Albumin 03/28/2024 4.5     Globulin, Total  03/28/2024 2.5     Albumin/Globulin Ratio 03/28/2024 1.8     TOTAL BILIRUBIN 03/28/2024 0.2     Alk Phos Isoenzymes 03/28/2024 147 (H)     AST 03/28/2024 40     ALT 03/28/2024 35 (H)     TSH 03/28/2024 2.240     Iron, Serum 03/28/2024 73     Ferritin 03/28/2024 66     Protein, Total 05/31/2024 6.3     Albumin 05/31/2024 4.0     TOTAL BILIRUBIN 05/31/2024 0.2     Bilirubin, Direct 05/31/2024 <0.10     Alk Phos Isoenzymes 05/31/2024 76     AST 05/31/2024 28     ALT 05/31/2024 19        Suicide/Homicide Risk Assessment:    Risk of Harm to Self:  The following ratings are based on assessment at the time of the interview  Historical Risk Factors include: chronic psychiatric problems  Protective Factors: no current suicidal ideation, compliant with medications, compliant with mental health treatment, good self-esteem, having a desire to live, stable living environment, strong relationships    Risk of Harm to Others:  The following ratings are based on assessment at the time of the interview  Historical Risk Factors include: none.  Protective Factors: no current homicidal ideation    The following interventions are recommended: contracts for safety at present - agrees to go to ED if feeling unsafe, contracts for safety at present - agrees to call Crisis Intervention Service if feeling unsafe      Lethality Statement:    Based on today's assessment and clinical criteria, Sanjana Mares contracts for safety and is not an imminent risk of harm to self or others. Outpatient level of care is deemed appropriate at this current time. Sanjana understands that if they can no longer contract for safety, they need to call the office or report to their nearest Emergency Room for immediate evaluation. They voiced understanding and agreement to call 911 or head to the nearest ED should they have any physical or mental decompensation whatsoever.       Assessment/Plan:     1.)  MDD  2.)  SHAD  3.)      After discussion of risks, benefits,  potential side effects, alternatives, we will continue duloxetine 60 mg daily, increase BuSpar to 30 mg twice daily, initiate doxepin 10 mg nightly as needed for insomnia.  Patient denies acute mental complaints or concerns at this time      Medication management every 3 months  Aware of 24 hour and weekend coverage for urgent situations accessed by calling [facility] main practice number    Medications Risks/Benefits      Risks, Benefits And Possible Side Effects Of Medications:    Risks, benefits, and possible side effects of medications explained to Sanjana and she verbalizes understanding and agreement for treatment.    Controlled Medication Discussion:     Sanjana has been filling controlled prescriptions on time as prescribed according to [place] Prescription Drug Monitoring Program    Psychotherapy Provided:     Individual psychotherapy provided: Crisis/safety plan discussed with Sanjana.     Treatment Plan:    Completed and signed during the session:  We will complete at next appointment due to lack of time      Visit Time    Visit Start Time: 8:30 AM  Visit Stop Time: 8:45 AM  Total Visit Duration:  15 minutes     The total visit duration detailed above includes: patient engagement, medication management, psychotherapy/counseling, discussion regarding treatment goals, documentation, review of past medical records, and coordination of care.      Note Share Disclaimer:     This note was not shared with the patient due to reasonable likelihood of causing patient harm      Surekha Del Real DO  Psychiatry  10/23/24

## 2024-10-31 ENCOUNTER — TELEMEDICINE (OUTPATIENT)
Dept: PSYCHIATRY | Facility: CLINIC | Age: 54
End: 2024-10-31
Payer: COMMERCIAL

## 2024-10-31 DIAGNOSIS — F41.1 GENERALIZED ANXIETY DISORDER: ICD-10-CM

## 2024-10-31 DIAGNOSIS — F06.31 MOOD DISORDER DUE TO KNOWN PHYSIOLOGICAL CONDITION WITH DEPRESSIVE FEATURES: ICD-10-CM

## 2024-10-31 DIAGNOSIS — G47.00 INSOMNIA, UNSPECIFIED TYPE: ICD-10-CM

## 2024-10-31 PROCEDURE — 99214 OFFICE O/P EST MOD 30 MIN: CPT | Performed by: STUDENT IN AN ORGANIZED HEALTH CARE EDUCATION/TRAINING PROGRAM

## 2024-11-04 RX ORDER — DOXEPIN HYDROCHLORIDE 10 MG/1
10 CAPSULE ORAL
Qty: 30 CAPSULE | Refills: 2 | Status: SHIPPED | OUTPATIENT
Start: 2024-11-04 | End: 2025-02-02

## 2024-11-04 RX ORDER — BUSPIRONE HYDROCHLORIDE 30 MG/1
30 TABLET ORAL 2 TIMES DAILY
Qty: 180 TABLET | Refills: 0 | Status: SHIPPED | OUTPATIENT
Start: 2024-11-04 | End: 2025-02-02

## 2024-11-04 RX ORDER — DULOXETIN HYDROCHLORIDE 60 MG/1
CAPSULE, DELAYED RELEASE ORAL
Qty: 90 CAPSULE | Refills: 0 | Status: SHIPPED | OUTPATIENT
Start: 2024-11-04

## 2024-11-04 NOTE — PSYCH
MEDICATION MANAGEMENT NOTE        [facility]      Name and Date of Birth:  Sanjana Mares 54 y.o. 1970 MRN: [id number]    Date of Visit: November 4, 2024    Reason for Visit: Follow-up visit for medication management     Virtual Visit Disclaimer:       TeleMed provider: Surekha Del Real D.O.    Location: [place]     Verification of patient location:     Patient is currently located in the [facility]  Patient is currently located in a state in which I am licensed     After connecting through [employer], the patient was identified by name and date of birth.  Sanjana Mares was informed that this is a telemedicine visit that is being conducted through [employer], and the patient was informed that this is a secure, HIPAA-compliant platform. My office door was closed. No one else was in the room. Sanjana Mares acknowledged consent and understanding of privacy and security of the video platform. Sanjana understands that the online visit is based solely on information provided by the patient, and that, in the absence of a face-to-face physical evaluation by the physician, the diagnosis Sanjana  receives is both limited and provisional in terms of accuracy and completeness. Sanjana Mares understands that they can discontinue the visit at any time. I informed Sanjana that I have reviewed their record in EPIC and presented the opportunity for them to ask any questions regarding the visit today. Sanjana Mares voiced understanding and consented to these terms. Sanjana is aware this is a billable service. Sanjana is present at her home and primary address      SUBJECTIVE:    Sanjana Mares is a 54 y.o. female with past psychiatric history significant for MDD, SHAD who was personally seen and evaluated today at the [facility] outpatient clinic for follow-up and medication management. Sanjana denied SI, HI, AVH, delusions, cosme since her last appointment.  She  endorsed significant improvement since her last appointment and after discussion of risks, benefits, potential side effects, alternatives, we will remain on current regimen as this without any changes due to its effectiveness.  Patient denies acute mental complaints concerns at this time      Current Rating Scores:     None completed today.    Review Of Systems:      Constitutional negative   ENT negative   Cardiovascular negative   Respiratory negative   Gastrointestinal negative   Genitourinary negative   Musculoskeletal negative   Integumentary negative   Neurological negative   Endocrine negative   Other Symptoms none, all other systems are negative       Past Psychiatric History: (unchanged information from previous note copied and italicized) - Information that is bolded has been updated.     See intake    Substance Abuse History: (unchanged information from previous note copied and italicized) - Information that is bolded has been updated.     See intake    Social History: (unchanged information from previous note copied and italicized) - Information that is bolded has been updated.     See intake    Traumatic History: (unchanged information from previous note copied and italicized) - Information that is bolded has been updated.       See intake    Past Medical History:    Past Medical History:   Diagnosis Date    Allergic     Anemia     Anxiety     Asthma     allergy induced    Depression     Eating disorder     prior to her gastric bypass was an over-eater    Headache(784.0)     Iron deficiency     Migraine     Obesity     RSD (reflex sympathetic dystrophy)     Thalassemia minor     TIA (transient ischemic attack)     reaction to baclofen??        Past Surgical History:   Procedure Laterality Date     SECTION      ,     CHOLECYSTECTOMY      GASTRIC BYPASS N/A 2005    TONSILLECTOMY       Allergies   Allergen Reactions    Baclofen Confusion     TIA    Coconut Oil - Food Allergy  "Swelling    Imitrex [Sumatriptan] Chest Pain       Substance Abuse History:    Social History     Substance and Sexual Activity   Alcohol Use Not Currently     Social History     Substance and Sexual Activity   Drug Use Yes    Frequency: 5.0 times per week    Types: Marijuana    Comment: medical marijuana       Social History:    Social History     Socioeconomic History    Marital status: Single     Spouse name: Not on file    Number of children: Not on file    Years of education: Not on file    Highest education level: Not on file   Occupational History    Not on file   Tobacco Use    Smoking status: Former     Current packs/day: 0.00     Average packs/day: 1 pack/day for 27.3 years (27.3 ttl pk-yrs)     Types: Cigarettes     Start date: 1/1/1982     Quit date: 5/1/2009     Years since quitting: 15.5    Smokeless tobacco: Never   Vaping Use    Vaping status: Never Used   Substance and Sexual Activity    Alcohol use: Not Currently    Drug use: Yes     Frequency: 5.0 times per week     Types: Marijuana     Comment: medical marijuana    Sexual activity: Not Currently     Partners: Male     Birth control/protection: Condom Male   Other Topics Concern    Not on file   Social History Narrative        2 children who are recovering addicts.     Daughter lives in [place] and son lives with her    Is a \"Universal \"     Social Determinants of Health     Financial Resource Strain: Low Risk  (9/27/2022)    Overall Financial Resource Strain (CARDIA)     Difficulty of Paying Living Expenses: Not hard at all   Food Insecurity: Not on file   Transportation Needs: Unmet Transportation Needs (9/27/2022)    PRAPARE - Transportation     Lack of Transportation (Medical): Yes     Lack of Transportation (Non-Medical): Yes   Physical Activity: Not on file   Stress: Not on file   Social Connections: Not on file   Intimate Partner Violence: Not on file   Housing Stability: Not on file       Family Psychiatric History: " "    Family History   Problem Relation Age of Onset    Mental illness Mother     Depression Mother     Diabetes Mother         Actually this is moms mom    ADD / ADHD Mother     Anxiety disorder Mother     Heart disease Father     Cancer Father 40        Oral cancer    Asthma Daughter     Autoimmune disease Daughter     No Known Problems Maternal Grandmother     No Known Problems Maternal Grandfather     No Known Problems Paternal Grandmother     No Known Problems Paternal Grandfather     Alcohol abuse Brother     Completed Suicide  Brother     Substance Abuse Brother     Drug abuse Brother     Psychiatric Illness Brother     Schizophrenia Brother     No Known Problems Brother     Substance Abuse Son        History Review: The following portions of the patient's history were reviewed and updated as appropriate: allergies, current medications, past family history, past medical history, past social history, past surgical history, and problem list.         OBJECTIVE:     Vital signs in last 24 hours:    There were no vitals filed for this visit.    Mental Status Evaluation:    Appearance age appropriate, casually dressed   Behavior Cooperative, mildly anxious   Speech normal rate, normal volume, normal pitch   Mood \"Better\"   Affect Constricted   Thought Processes organized, goal directed   Associations intact associations   Thought Content no overt delusions   Perceptual Disturbances: no auditory hallucinations, no visual hallucinations   Abnormal Thoughts  Risk Potential Suicidal ideation - None  Homicidal ideation - None  Potential for aggression - No   Orientation oriented to person, place, time/date, and situation   Memory recent and remote memory grossly intact   Consciousness alert and awake   Attention Span Concentration Span attention span and concentration are age appropriate   Intellect appears to be of average intelligence   Insight intact   Judgement intact   Muscle Strength and  Gait unable to assess today " due to virtual visit   Motor activity unable to assess today due to virtual visit   Language no difficulty naming common objects, no difficulty repeating a phrase   Fund of Knowledge adequate knowledge of current events  adequate fund of knowledge regarding past history  adequate fund of knowledge regarding vocabulary    Pain none   Pain Scale Did not ask patient to formally rate       Laboratory Results: I have personally reviewed all pertinent laboratory/tests results    Recent Labs (last 2 months):   No visits with results within 2 Month(s) from this visit.   Latest known visit with results is:   Office Visit on 03/22/2024   Component Date Value    White Blood Cell Count 03/28/2024 7.2     Red Blood Cell Count 03/28/2024 5.61 (H)     Hemoglobin 03/28/2024 11.8     HCT 03/28/2024 38.6     MCV 03/28/2024 69 (L)     MCH 03/28/2024 21.0 (L)     MCHC 03/28/2024 30.6 (L)     RDW 03/28/2024 15.9 (H)     Platelet Count 03/28/2024 386     Neutrophils 03/28/2024 40     Lymphocytes 03/28/2024 37     Monocytes 03/28/2024 7     Eosinophils 03/28/2024 15     Basophils PCT 03/28/2024 1     Neutrophils (Absolute) 03/28/2024 2.9     Lymphocytes (Absolute) 03/28/2024 2.6     Monocytes (Absolute) 03/28/2024 0.5     Eosinophils (Absolute) 03/28/2024 1.1 (H)     Basophils ABS 03/28/2024 0.1     Immature Granulocytes 03/28/2024 0     Immature Granulocytes (A* 03/28/2024 0.0     Glucose, Random 03/28/2024 112 (H)     BUN 03/28/2024 15     Creatinine 03/28/2024 0.86     eGFR 03/28/2024 80     SL AMB BUN/CREATININE RA* 03/28/2024 17     Sodium 03/28/2024 137     Potassium 03/28/2024 3.6     Chloride 03/28/2024 97     CO2 03/28/2024 23     CALCIUM 03/28/2024 9.3     Protein, Total 03/28/2024 7.0     Albumin 03/28/2024 4.5     Globulin, Total 03/28/2024 2.5     Albumin/Globulin Ratio 03/28/2024 1.8     TOTAL BILIRUBIN 03/28/2024 0.2     Alk Phos Isoenzymes 03/28/2024 147 (H)     AST 03/28/2024 40     ALT 03/28/2024 35 (H)     TSH  03/28/2024 2.240     Iron, Serum 03/28/2024 73     Ferritin 03/28/2024 66     Protein, Total 05/31/2024 6.3     Albumin 05/31/2024 4.0     TOTAL BILIRUBIN 05/31/2024 0.2     Bilirubin, Direct 05/31/2024 <0.10     Alk Phos Isoenzymes 05/31/2024 76     AST 05/31/2024 28     ALT 05/31/2024 19        Suicide/Homicide Risk Assessment:    Risk of Harm to Self:  The following ratings are based on assessment at the time of the interview  Historical Risk Factors include: chronic psychiatric problems  Protective Factors: no current suicidal ideation, compliant with medications, compliant with mental health treatment, good self-esteem, having a desire to live, stable living environment, strong relationships    Risk of Harm to Others:  The following ratings are based on assessment at the time of the interview  Historical Risk Factors include: none.  Protective Factors: no current homicidal ideation    The following interventions are recommended: contracts for safety at present - agrees to go to ED if feeling unsafe, contracts for safety at present - agrees to call Crisis Intervention Service if feeling unsafe      Lethality Statement:    Based on today's assessment and clinical criteria, Sanjana Mares contracts for safety and is not an imminent risk of harm to self or others. Outpatient level of care is deemed appropriate at this current time. Sanjana understands that if they can no longer contract for safety, they need to call the office or report to their nearest Emergency Room for immediate evaluation. They voiced understanding and agreement to call 911 or head to the nearest ED should they have any physical or mental decompensation whatsoever.       Assessment/Plan:     1.)  MDD  2.)  SHAD  3.)      After discussion of risks, benefits, potential side effects, alternatives, we will continue duloxetine 60 mg daily, BuSpar 30 mg twice daily, doxepin 10 mg nightly as needed for insomnia.  Patient denies acute mental complaints or  concerns at this time      Medication management every 3 months  Aware of 24 hour and weekend coverage for urgent situations accessed by calling [facility] main practice number    Medications Risks/Benefits      Risks, Benefits And Possible Side Effects Of Medications:    Risks, benefits, and possible side effects of medications explained to Sanjana and she verbalizes understanding and agreement for treatment.    Controlled Medication Discussion:     Sanjana has been filling controlled prescriptions on time as prescribed according to [place] Prescription Drug Monitoring Program    Psychotherapy Provided:     Individual psychotherapy provided: Crisis/safety plan discussed with Sanjana.     Treatment Plan:    Completed and signed during the session:  We will complete at next appointment due to lack of time      Visit Time    Visit Start Time: 10:30 AM  Visit Stop Time: 10:50 AM  Total Visit Duration:  20 minutes     The total visit duration detailed above includes: patient engagement, medication management, psychotherapy/counseling, discussion regarding treatment goals, documentation, review of past medical records, and coordination of care.      Note Share Disclaimer:     This note was not shared with the patient due to reasonable likelihood of causing patient harm      Surekha Del Real DO  Psychiatry  11/04/24

## 2024-11-07 ENCOUNTER — PATIENT MESSAGE (OUTPATIENT)
Dept: FAMILY MEDICINE CLINIC | Facility: CLINIC | Age: 54
End: 2024-11-07

## 2024-11-07 DIAGNOSIS — R73.01 IMPAIRED FASTING GLUCOSE: ICD-10-CM

## 2024-11-07 DIAGNOSIS — E61.1 IRON DEFICIENCY: ICD-10-CM

## 2024-11-07 DIAGNOSIS — Z98.84 HISTORY OF GASTRIC BYPASS: ICD-10-CM

## 2024-11-07 DIAGNOSIS — I10 ESSENTIAL HYPERTENSION: ICD-10-CM

## 2024-11-07 DIAGNOSIS — D56.3 THALASSEMIA MINOR: Primary | ICD-10-CM

## 2024-11-07 DIAGNOSIS — E78.2 MIXED HYPERLIPIDEMIA: ICD-10-CM

## 2024-11-07 DIAGNOSIS — R53.83 OTHER FATIGUE: ICD-10-CM

## 2024-11-08 NOTE — PATIENT COMMUNICATION
Patient messaged requesting labs.   She was last seen in April and is overdue for her 6 month f/u visit.   I can order labs but she should make appt to review in office.   Also, looks like she has not seen hematology since April of 2023 and they wanted to see her back in 6 months (October of 2023) so will need appt there as well.

## 2024-11-09 DIAGNOSIS — Z00.6 ENCOUNTER FOR EXAMINATION FOR NORMAL COMPARISON OR CONTROL IN CLINICAL RESEARCH PROGRAM: ICD-10-CM

## 2024-11-11 ENCOUNTER — TELEPHONE (OUTPATIENT)
Age: 54
End: 2024-11-11

## 2024-11-11 NOTE — TELEPHONE ENCOUNTER
Patient called to state that her emergency contact Glendy montejo will be the one picking up the printed labs for her blood work today thank you

## 2024-11-11 NOTE — TELEPHONE ENCOUNTER
Patient called in wanting to make a follow up appointment with Syd. She hasn't been seen since October of 2023. She is wanting to be seen for another infusion due to how she is feeling. She would like to know if an order can be placed for her to get it done or if she has to be seen first before one can be ordered.

## 2024-11-11 NOTE — TELEPHONE ENCOUNTER
I would prefer to see her and chat about her symptoms/lab results and determine amount of infusions. I can do this virtually if needed

## 2024-11-11 NOTE — TELEPHONE ENCOUNTER
Per [facility] patient will need labs and then a f/u with her which can be virtual. Called the patient instructed her to have the ordered labs completed. She stated she will go today or tomorrow. She is scheduled for a virtual visit with [facility] on 11/21. Patient verbalized understanding.

## 2024-11-16 LAB
ALBUMIN SERPL-MCNC: 4.1 G/DL (ref 3.8–4.9)
ALP SERPL-CCNC: 140 IU/L (ref 44–121)
ALT SERPL-CCNC: 86 IU/L (ref 0–32)
AST SERPL-CCNC: 63 IU/L (ref 0–40)
BASOPHILS # BLD AUTO: 0.1 X10E3/UL (ref 0–0.2)
BASOPHILS NFR BLD AUTO: 1 %
BILIRUB SERPL-MCNC: 0.3 MG/DL (ref 0–1.2)
BUN SERPL-MCNC: 12 MG/DL (ref 6–24)
BUN/CREAT SERPL: 17 (ref 9–23)
CALCIUM SERPL-MCNC: 9.4 MG/DL (ref 8.7–10.2)
CHLORIDE SERPL-SCNC: 103 MMOL/L (ref 96–106)
CHOLEST SERPL-MCNC: 213 MG/DL (ref 100–199)
CHOLEST/HDLC SERPL: 2.3 RATIO (ref 0–4.4)
CO2 SERPL-SCNC: 18 MMOL/L (ref 20–29)
CREAT SERPL-MCNC: 0.71 MG/DL (ref 0.57–1)
EGFR: 100 ML/MIN/1.73
EOSINOPHIL # BLD AUTO: 1.1 X10E3/UL (ref 0–0.4)
EOSINOPHIL NFR BLD AUTO: 13 %
ERYTHROCYTE [DISTWIDTH] IN BLOOD BY AUTOMATED COUNT: 16.6 % (ref 11.7–15.4)
EST. AVERAGE GLUCOSE BLD GHB EST-MCNC: 97 MG/DL
GLOBULIN SER-MCNC: 2.4 G/DL (ref 1.5–4.5)
GLUCOSE SERPL-MCNC: 88 MG/DL (ref 70–99)
HBA1C MFR BLD: 5 % (ref 4.8–5.6)
HCT VFR BLD AUTO: 37.3 % (ref 34–46.6)
HDLC SERPL-MCNC: 91 MG/DL
HGB BLD-MCNC: 11.1 G/DL (ref 11.1–15.9)
IMM GRANULOCYTES # BLD: 0 X10E3/UL (ref 0–0.1)
IMM GRANULOCYTES NFR BLD: 0 %
IRON SERPL-MCNC: 157 UG/DL (ref 27–159)
LDLC SERPL CALC-MCNC: 102 MG/DL (ref 0–99)
LYMPHOCYTES # BLD AUTO: 3.8 X10E3/UL (ref 0.7–3.1)
LYMPHOCYTES NFR BLD AUTO: 45 %
MCH RBC QN AUTO: 20.7 PG (ref 26.6–33)
MCHC RBC AUTO-ENTMCNC: 29.8 G/DL (ref 31.5–35.7)
MCV RBC AUTO: 70 FL (ref 79–97)
MONOCYTES # BLD AUTO: 0.4 X10E3/UL (ref 0.1–0.9)
MONOCYTES NFR BLD AUTO: 5 %
NEUTROPHILS # BLD AUTO: 3 X10E3/UL (ref 1.4–7)
NEUTROPHILS NFR BLD AUTO: 36 %
PLATELET # BLD AUTO: 443 X10E3/UL (ref 150–450)
POTASSIUM SERPL-SCNC: 4.1 MMOL/L (ref 3.5–5.2)
PROT SERPL-MCNC: 6.5 G/DL (ref 6–8.5)
RBC # BLD AUTO: 5.36 X10E6/UL (ref 3.77–5.28)
SL AMB VLDL CHOLESTEROL CALC: 20 MG/DL (ref 5–40)
SODIUM SERPL-SCNC: 138 MMOL/L (ref 134–144)
TRIGL SERPL-MCNC: 117 MG/DL (ref 0–149)
TSH SERPL DL<=0.005 MIU/L-ACNC: 1.48 UIU/ML (ref 0.45–4.5)
WBC # BLD AUTO: 8.4 X10E3/UL (ref 3.4–10.8)

## 2024-11-18 ENCOUNTER — RESULTS FOLLOW-UP (OUTPATIENT)
Dept: FAMILY MEDICINE CLINIC | Facility: CLINIC | Age: 54
End: 2024-11-18

## 2024-11-18 NOTE — TELEPHONE ENCOUNTER
----- Message from Cecille Chavez DO sent at 11/18/2024  2:24 PM EST -----  Let patient know that her labs showed elevation of liver enzymes.  Should avoid alcohol and otc pain meds such as tylenol and advil.   We can discuss labs in more detail at upcoming visit but want to be sure she is not having any belly pain or nausea prior to visit.

## 2024-11-21 ENCOUNTER — TELEMEDICINE (OUTPATIENT)
Age: 54
End: 2024-11-21
Payer: COMMERCIAL

## 2024-11-21 DIAGNOSIS — E61.1 IRON DEFICIENCY: Primary | ICD-10-CM

## 2024-11-21 DIAGNOSIS — Z78.9 VEGAN DIET: ICD-10-CM

## 2024-11-21 DIAGNOSIS — E53.8 B12 DEFICIENCY: ICD-10-CM

## 2024-11-21 DIAGNOSIS — Z98.84 HISTORY OF GASTRIC BYPASS: ICD-10-CM

## 2024-11-21 DIAGNOSIS — D56.3 THALASSEMIA MINOR: ICD-10-CM

## 2024-11-21 PROCEDURE — 99213 OFFICE O/P EST LOW 20 MIN: CPT | Performed by: NURSE PRACTITIONER

## 2024-11-21 NOTE — PROGRESS NOTES
HEMATOLOGY / ONCOLOGY CLINIC FOLLOW UP NOTE    Primary Care Provider: Cecille Chavez DO  Referring Provider:    MRN: [id number]  : 1970    Reason for Encounter: Follow-up visit for iron deficiency anemia, thalassemia minor trait.     Telemedicine consent    Patient: Sanjana Mares  Provider: MABLE Vega  Provider located at [facility] [address]  FIRST FLOOR  [facility] [address] [phone]    The patient was identified by name and date of birth. Sanjana Mares was informed that this is a telemedicine visit and that the visit is being conducted through the Epic Embedded platform. She agrees to proceed..  My office door was closed. No one else was in the room.  She acknowledged consent and understanding of privacy and security of the video platform. The patient has agreed to participate and understands they can discontinue the visit at any time.    Patient is aware this is a billable service.     Verification of patient location:     Patient is currently located in the [facility]  Patient is currently located in a state in which I am licensed        Interval History: Patient is being seen virtually for history of iron deficiency anemia.  She was last seen on 2023. Since last visit, she has changed her diet to strict Vegan. She has lost 70 lbs as a result.  She is feeling tired.   Blood work done prior to today's visit is consistent with her known history of thalassemia.  RBCs elevated, hemoglobin 11.1, MCV 70.  White count, platelet count and differential are normal  Serum iron normal 157  CMP does show some elevation in her AST/ALT and alk phos. She does not consume any alcohol.     REVIEW OF SYSTEMS:  Please note that a 14-point review of systems was performed to include Constitutional, HEENT, Respiratory, CVS, GI, , Musculoskeletal, Integumentary, Neurologic, Rheumatologic, Endocrinologic,  Psychiatric, Lymphatic, and Hematologic/Oncologic systems were reviewed and are negative unless otherwise stated in HPI. Positive and negative findings pertinent to this evaluation are incorporated into the history of present illness.      PROBLEM LIST:  Patient Active Problem List   Diagnosis    RSD (reflex sympathetic dystrophy)    Chronic pain syndrome    Thalassemia minor    History of gastric bypass    History of tobacco use    Anxiety    Family history of premature CAD    Iron deficiency    Continuous opioid dependence (HCC)    Impaired fasting glucose    Mixed hyperlipidemia    Mood 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    Positive colorectal cancer screening using Cologuard test    Migraine without aura    Depression, recurrent (HCC)    Bilateral lower extremity edema    Essential hypertension    Therapeutic opioid-induced constipation (OIC)       Assessment / Plan:  1. Iron deficiency    2. History of gastric bypass    3. B12 deficiency    4. Vegan diet    5. Thalassemia minor      Patient has a history of gastric bypass and iron deficiency anemia likely secondary to postsurgical malabsorption.  She has been treated with IV iron multiple times over the years, last treated 1/2023  She is also now strictly vegan.  Recent blood work is normal, however serum ferritin, B12, folate were not drawn.  Given her strictly vegan diet, I would like to check these levels to ensure they are not diminished and contributing to her symptoms of fatigue.  She will have blood work drawn and I will call her with results.  Patient is in agreement.  Will determine ongoing follow-up based on results of requested blood work.  She knows to call anytime with questions or concerns    I spent 20 minutes on chart review, face to face counseling time, coordination of care and documentation.    Past Medical History:   has a past medical history of Allergic, Anemia, Anxiety, Asthma, Depression, Eating  disorder, Headache(784.0), Iron deficiency, Migraine, Obesity, RSD (reflex sympathetic dystrophy), Thalassemia minor, and TIA (transient ischemic attack).    PAST SURGICAL HISTORY:   has a past surgical history that includes Gastric bypass (N/A, 2005); Tonsillectomy;  section; and Cholecystectomy ().    CURRENT MEDICATIONS  Current Outpatient Medications   Medication Sig Dispense Refill    busPIRone (BUSPAR) 30 MG tablet Take 1 tablet (30 mg total) by mouth 2 (two) times a day 180 tablet 0    butalbital-acetaminophen-caffeine (FIORICET,ESGIC) -40 mg per tablet Take 30 tablets by mouth every 28 days      cetirizine (ZyrTEC) 10 mg tablet Take 10 mg by mouth daily      DULoxetine (CYMBALTA) 60 mg delayed release capsule Duloxetine 60m capsule in the morning 90 capsule 0    hydroCHLOROthiazide 25 mg tablet TAKE ONE TABLET BY MOUTH DAILY 30 tablet 5    morphine 20 MG/5ML solution Take 20 mg by mouth 3 (three) times a day      Movantik 25 MG tablet Take 25 mg by mouth in the morning      ondansetron (ZOFRAN) 8 mg tablet Take 8 mg by mouth every 8 (eight) hours as needed for nausea or vomiting      topiramate (TOPAMAX) 25 mg tablet Take 100 mg by mouth 2 (two) times a day      transdermal buprenorphine (BUTRANS) 20 mcg/hr PTWK TD patch Place 1 patch on the skin once a week      TiZANidine (ZANAFLEX) 4 MG capsule Take 4 mg by mouth every 8 (eight) hours (Patient not taking: Reported on 2024)       No current facility-administered medications for this visit.     [unfilled]    SOCIAL HISTORY:   reports that she quit smoking about 15 years ago. Her smoking use included cigarettes. She started smoking about 42 years ago. She has a 27.3 pack-year smoking history. She has never used smokeless tobacco. She reports that she does not currently use alcohol. She reports current drug use. Frequency: 5.00 times per week. Drug: Marijuana.     FAMILY HISTORY:  family history includes ADD / ADHD in her  mother; Alcohol abuse in her brother; Anxiety disorder in her mother; Asthma in her daughter; Autoimmune disease in her daughter; Cancer (age of onset: 40) in her father; Completed Suicide  in her brother; Depression in her mother; Diabetes in her mother; Drug abuse in her brother; Heart disease in her father; Mental illness in her mother; No Known Problems in her brother, maternal grandfather, maternal grandmother, paternal grandfather, and paternal grandmother; Psychiatric Illness in her brother; Schizophrenia in her brother; Substance Abuse in her brother and son.     ALLERGIES:  is allergic to baclofen, coconut oil - food allergy, and imitrex [sumatriptan].      Physical Exam:  Vital Signs:   Visit Vitals  OB Status Postmenopausal   Smoking Status Former     There is no height or weight on file to calculate BMI.  There is no height or weight on file to calculate BSA.    Physical Exam    Labs:  Lab Results   Component Value Date    WBC 8.4 11/15/2024    HGB 11.1 11/15/2024    HCT 37.3 11/15/2024    MCV 70 (L) 11/15/2024     11/15/2024     Lab Results   Component Value Date    SODIUM 138 11/15/2024    K 4.1 11/15/2024     11/15/2024    CO2 18 (L) 11/15/2024    BUN 12 11/15/2024    CREATININE 0.71 11/15/2024    GLUC 88 11/15/2024    AST 63 (H) 11/15/2024    ALT 86 (H) 11/15/2024    TP 6.5 11/15/2024    TBILI 0.3 11/15/2024    EGFR 100 11/15/2024

## 2024-12-02 LAB
FERRITIN SERPL-MCNC: 22 NG/ML (ref 15–150)
FOLATE SERPL-MCNC: >20 NG/ML
METHYLMALONATE SERPL-SCNC: 144 NMOL/L (ref 0–378)
VIT B12 SERPL-MCNC: >2000 PG/ML (ref 232–1245)

## 2024-12-09 ENCOUNTER — TELEPHONE (OUTPATIENT)
Age: 54
End: 2024-12-09

## 2024-12-09 NOTE — TELEPHONE ENCOUNTER
Called patient, scheduled lyft for 12/11/2024  time approx 12:51pm-patient notified [employer] will send message day of appt.

## 2024-12-09 NOTE — TELEPHONE ENCOUNTER
Patient called because she has an appointment this Wednesday December 11th at 1:20. She does not drive and she wanted to make an arrangement for a lift ride to her appointment if possible please can someone please follow up with this patient in regards to this?     thank you

## 2024-12-11 ENCOUNTER — OFFICE VISIT (OUTPATIENT)
Dept: FAMILY MEDICINE CLINIC | Facility: CLINIC | Age: 54
End: 2024-12-11
Payer: COMMERCIAL

## 2024-12-11 VITALS
DIASTOLIC BLOOD PRESSURE: 80 MMHG | WEIGHT: 136.2 LBS | TEMPERATURE: 98 F | OXYGEN SATURATION: 99 % | BODY MASS INDEX: 20.11 KG/M2 | HEART RATE: 68 BPM | SYSTOLIC BLOOD PRESSURE: 128 MMHG

## 2024-12-11 DIAGNOSIS — R74.01 TRANSAMINASEMIA: ICD-10-CM

## 2024-12-11 DIAGNOSIS — F11.20 CONTINUOUS OPIOID DEPENDENCE (HCC): ICD-10-CM

## 2024-12-11 DIAGNOSIS — F33.9 DEPRESSION, RECURRENT (HCC): ICD-10-CM

## 2024-12-11 DIAGNOSIS — Z98.84 HISTORY OF GASTRIC BYPASS: ICD-10-CM

## 2024-12-11 DIAGNOSIS — R73.01 IMPAIRED FASTING GLUCOSE: ICD-10-CM

## 2024-12-11 DIAGNOSIS — N95.1 VAGINAL DRYNESS, MENOPAUSAL: ICD-10-CM

## 2024-12-11 DIAGNOSIS — Z00.00 MEDICARE ANNUAL WELLNESS VISIT, SUBSEQUENT: Primary | ICD-10-CM

## 2024-12-11 DIAGNOSIS — I10 ESSENTIAL HYPERTENSION: ICD-10-CM

## 2024-12-11 DIAGNOSIS — G89.4 CHRONIC PAIN SYNDROME: ICD-10-CM

## 2024-12-11 DIAGNOSIS — D56.3 THALASSEMIA MINOR: ICD-10-CM

## 2024-12-11 PROBLEM — R19.5 POSITIVE COLORECTAL CANCER SCREENING USING COLOGUARD TEST: Status: RESOLVED | Noted: 2023-11-20 | Resolved: 2024-12-11

## 2024-12-11 PROCEDURE — G0439 PPPS, SUBSEQ VISIT: HCPCS | Performed by: FAMILY MEDICINE

## 2024-12-11 PROCEDURE — 99214 OFFICE O/P EST MOD 30 MIN: CPT | Performed by: FAMILY MEDICINE

## 2024-12-11 RX ORDER — ARIPIPRAZOLE 10 MG/1
10 TABLET ORAL
COMMUNITY
Start: 2024-11-13 | End: 2024-12-12 | Stop reason: SDUPTHER

## 2024-12-11 RX ORDER — METHOCARBAMOL 750 MG/1
750 TABLET, FILM COATED ORAL
COMMUNITY
Start: 2024-11-13

## 2024-12-11 RX ORDER — ESTRADIOL 0.1 MG/G
2 CREAM VAGINAL 2 TIMES WEEKLY
Qty: 42.5 G | Refills: 1 | Status: SHIPPED | OUTPATIENT
Start: 2024-12-12

## 2024-12-11 NOTE — ASSESSMENT & PLAN NOTE
Rx for estrace cream twice weekly  Orders:  •  estradiol (ESTRACE VAGINAL) 0.1 mg/g vaginal cream; Insert 2 g into the vagina 2 (two) times a week

## 2024-12-11 NOTE — PROGRESS NOTES
Patient is a 54 year old female with HTN, HLD, IFG, anxiety, depression, thalassemia minor, iron deficiency, chronic pain and history of gastric bypass who is being seen today for annual medicare wellness exam and review of labs done in November.     Sees multiple specialists.  Follows with pain management (Dr Bromberg) for her RSD and chronic pain. On butrans and morphine.     Follows with [place] neurolog[facility] (Dr Rene) for her migraines.     Follows with psychiatry ([facility]) for mood disorder.     Follows with hematology for her thalassemia minor and iron deficiency anemia. Her CBC in November showed normal H/H. Iron level was normal at 157 but ferritin was low at 22.     Liver enzymes were elevated on her labs done 11/15. AST and ALT were elevated at 63/86 and Alk phos also elevated at 140.

## 2024-12-11 NOTE — PATIENT INSTRUCTIONS
Medicare Preventive Visit Patient Instructions  Thank you for completing your Welcome to Medicare Visit or Medicare Annual Wellness Visit today. Your next wellness visit will be due in one year (12/12/2025).  The screening/preventive services that you may require over the next 5-10 years are detailed below. Some tests may not apply to you based off risk factors and/or age. Screening tests ordered at today's visit but not completed yet may show as past due. Also, please note that scanned in results may not display below.  Preventive Screenings:  Service Recommendations Previous Testing/Comments   Colorectal Cancer Screening  * Colonoscopy    * Fecal Occult Blood Test (FOBT)/Fecal Immunochemical Test (FIT)  * Fecal DNA/Cologuard Test  * Flexible Sigmoidoscopy Age: 45-75 years old   Colonoscopy: every 10 years (may be performed more frequently if at higher risk)  OR  FOBT/FIT: every 1 year  OR  Cologuard: every 3 years  OR  Sigmoidoscopy: every 5 years  Screening may be recommended earlier than age 45 if at higher risk for colorectal cancer. Also, an individualized decision between you and your healthcare provider will decide whether screening between the ages of 76-85 would be appropriate. Colonoscopy: 08/23/2024  FOBT/FIT: Not on file  Cologuard: 11/10/2023  Sigmoidoscopy: Not on file          Breast Cancer Screening Age: 40+ years old  Frequency: every 1-2 years  Not required if history of left and right mastectomy Mammogram: 07/18/2024        Cervical Cancer Screening Between the ages of 21-29, pap smear recommended once every 3 years.   Between the ages of 30-65, can perform pap smear with HPV co-testing every 5 years.   Recommendations may differ for women with a history of total hysterectomy, cervical cancer, or abnormal pap smears in past. Pap Smear: 06/16/2023        Hepatitis C Screening Once for adults born between 1945 and 1965  More frequently in patients at high risk for Hepatitis C Hep C Antibody:  05/31/2024        Diabetes Screening 1-2 times per year if you're at risk for diabetes or have pre-diabetes Fasting glucose: No results in last 5 years (No results in last 5 years)  A1C: 5.0 % (11/15/2024)      Cholesterol Screening Once every 5 years if you don't have a lipid disorder. May order more often based on risk factors. Lipid panel: 11/15/2024          Other Preventive Screenings Covered by Medicare:  Abdominal Aortic Aneurysm (AAA) Screening: covered once if your at risk. You're considered to be at risk if you have a family history of AAA.  Lung Cancer Screening: covers low dose CT scan once per year if you meet all of the following conditions: (1) Age 55-77; (2) No signs or symptoms of lung cancer; (3) Current smoker or have quit smoking within the last 15 years; (4) You have a tobacco smoking history of at least 20 pack years (packs per day multiplied by number of years you smoked); (5) You get a written order from a healthcare provider.  Glaucoma Screening: covered annually if you're considered high risk: (1) You have diabetes OR (2) Family history of glaucoma OR (3)  aged 50 and older OR (4)  American aged 65 and older  Osteoporosis Screening: covered every 2 years if you meet one of the following conditions: (1) You're estrogen deficient and at risk for osteoporosis based off medical history and other findings; (2) Have a vertebral abnormality; (3) On glucocorticoid therapy for more than 3 months; (4) Have primary hyperparathyroidism; (5) On osteoporosis medications and need to assess response to drug therapy.   Last bone density test (DXA Scan): Not on file.  HIV Screening: covered annually if you're between the age of 15-65. Also covered annually if you are younger than 15 and older than 65 with risk factors for HIV infection. For pregnant patients, it is covered up to 3 times per pregnancy.    Immunizations:  Immunization Recommendations   Influenza Vaccine Annual influenza  vaccination during flu season is recommended for all persons aged >= 6 months who do not have contraindications   Pneumococcal Vaccine   * Pneumococcal conjugate vaccine = PCV13 (Prevnar 13), PCV15 (Vaxneuvance), PCV20 (Prevnar 20)  * Pneumococcal polysaccharide vaccine = PPSV23 (Pneumovax) Adults 19-63 yo with certain risk factors or if 65+ yo  If never received any pneumonia vaccine: recommend Prevnar 20 (PCV20)  Give PCV20 if previously received 1 dose of PCV13 or PPSV23   Hepatitis B Vaccine 3 dose series if at intermediate or high risk (ex: diabetes, end stage renal disease, liver disease)   Respiratory syncytial virus (RSV) Vaccine - COVERED BY MEDICARE PART D  * RSVPreF3 (Arexvy) CDC recommends that adults 60 years of age and older may receive a single dose of RSV vaccine using shared clinical decision-making (SCDM)   Tetanus (Td) Vaccine - COST NOT COVERED BY MEDICARE PART B Following completion of primary series, a booster dose should be given every 10 years to maintain immunity against tetanus. Td may also be given as tetanus wound prophylaxis.   Tdap Vaccine - COST NOT COVERED BY MEDICARE PART B Recommended at least once for all adults. For pregnant patients, recommended with each pregnancy.   Shingles Vaccine (Shingrix) - COST NOT COVERED BY MEDICARE PART B  2 shot series recommended in those 19 years and older who have or will have weakened immune systems or those 50 years and older     Health Maintenance Due:      Topic Date Due   • HIV Screening  Never done   • Breast Cancer Screening: Mammogram  07/18/2025   • Cervical Cancer Screening  06/16/2028   • Colorectal Cancer Screening  08/21/2034   • Hepatitis C Screening  Completed     Immunizations Due:      Topic Date Due   • Pneumococcal Vaccine: Pediatrics (0 to 5 Years) and At-Risk Patients (6 to 64 Years) (1 of 2 - PCV) Never done   • Influenza Vaccine (1) 09/01/2024   • COVID-19 Vaccine (4 - 2024-25 season) 09/01/2024     Advance Directives   What  are advance directives?  Advance directives are legal documents that state your wishes and plans for medical care. These plans are made ahead of time in case you lose your ability to make decisions for yourself. Advance directives can apply to any medical decision, such as the treatments you want, and if you want to donate organs.   What are the types of advance directives?  There are many types of advance directives, and each state has rules about how to use them. You may choose a combination of any of the following:  Living will:  This is a written record of the treatment you want. You can also choose which treatments you do not want, which to limit, and which to stop at a certain time. This includes surgery, medicine, IV fluid, and tube feedings.   Durable power of  for healthcare (DPAHC):  This is a written record that states who you want to make healthcare choices for you when you are unable to make them for yourself. This person, called a proxy, is usually a family member or a friend. You may choose more than 1 proxy.  Do not resuscitate (DNR) order:  A DNR order is used in case your heart stops beating or you stop breathing. It is a request not to have certain forms of treatment, such as CPR. A DNR order may be included in other types of advance directives.  Medical directive:  This covers the care that you want if you are in a coma, near death, or unable to make decisions for yourself. You can list the treatments you want for each condition. Treatment may include pain medicine, surgery, blood transfusions, dialysis, IV or tube feedings, and a ventilator (breathing machine).  Values history:  This document has questions about your views, beliefs, and how you feel and think about life. This information can help others choose the care that you would choose.  Why are advance directives important?  An advance directive helps you control your care. Although spoken wishes may be used, it is better to have  your wishes written down. Spoken wishes can be misunderstood, or not followed. Treatments may be given even if you do not want them. An advance directive may make it easier for your family to make difficult choices about your care.       © Copyright [employer] 2018 Information is for End User's use only and may not be sold, redistributed or otherwise used for commercial purposes. All illustrations and images included in CareNotes® are the copyrighted property of A.D.A.M., Inc. or [employer]

## 2024-12-11 NOTE — PROGRESS NOTES
Name: Sanjana Mares      : 1970      MRN: [id number]  Encounter Provider: Cecille Chavez DO  Encounter Date: 2024   Encounter department: [facility] PRIMARY CARE    Assessment & Plan  Medicare annual wellness visit, subsequent         Transaminasemia  Reviewed labs done on 11/15  Her AST and ALT were elevated at 63/86 respectively and Alk phos also elevated at 140  Discussed possible etiologies of elevated liver enzymes including medication, virus, autoimmune, obstructive, medications, alcohol, fatty liver, etc.   She had gallbladder removed in    No new prescription medications but was taking otc advil and tylenol prior to the lab draw. Has since had rhizotomy and stopped taking  This could likely be the cause of the liver enzyme elevation  Will have her repeat AST, ALT and alk phos in about 6 weeks and I will message/call with results  If remains elevated, consider referral to GI    Orders:  •  AST; Future  •  ALT; Future  •  Alkaline phosphatase; Future    History of gastric bypass  Had cbc, iron and ferritin done with recent labs.   Will get vitamin D when she goes for her repeat liver enzymes.   Orders:  •  Vitamin D 25 hydroxy; Future    Essential hypertension  Bp at goal on hctz 25 mg daily       Impaired fasting glucose  Her cmp and A1c were normal on recent labs  She is requesting fasting insulin levels which I was hesitant to order as it will not really  but she feels that it helps her with her diet so order placed.   Orders:  •  Insulin (2 Specimens); Future    Depression, recurrent (HCC)  Stable  Follows with psych       Continuous opioid dependence (HCC)  Stable on butrans  Follows with pain management        Thalassemia minor         Chronic pain syndrome         Vaginal dryness, menopausal  Rx for estrace cream twice weekly  Orders:  •  estradiol (ESTRACE VAGINAL) 0.1 mg/g vaginal cream; Insert 2 g into the vagina 2 (two) times a week        Preventive health issues were discussed with patient, and age appropriate screening tests were ordered as noted in patient's After Visit Summary. Personalized health advice and appropriate referrals for health education or preventive services given if needed, as noted in patient's After Visit Summary.      History of Present Illness     HPI   Patient is a 54 year old female with HTN, HLD, IFG, anxiety, depression, thalassemia minor, iron deficiency, chronic pain and history of gastric bypass who is being seen today for annual medicare wellness exam and review of labs done in November.     Her mammogram is up to date (7/18/24)  Colon cancer screening up to date (8/23/24)  Cervical cancer screening up to date (6/16/23)    Sees multiple specialists.  Follows with pain management (Dr Bromberg) for her RSD and chronic pain. On butrans and morphine.     Follows with [place] [facility] (Dr Rene) for her migraines.     Follows with psychiatry ([facility]) for mood disorder.     Follows with hematology for her thalassemia minor and iron deficiency anemia. Her CBC in November showed normal H/H. Iron level was normal at 157 but ferritin was low at 22.     Liver enzymes were elevated on her labs done 11/15. AST and ALT were elevated at 63/86 and Alk phos also elevated at 140  She had cholecystectomy in 1995.     States that she had just procedure at pain management (bilateral rhizotomy) and had been taking a lot of otc advil and tylenol. No other med changes.     Has not had to use her movantik since bowels moving better. Eating more fiber.     Her glucose and A1c were normal on recent labs. She still wants fasting insulin level checked when we repeat her LFT's    Declines flu and covid vaccine today. She plans to get this after return from upcoming vacation    Some vaginal dryness. Using olive oil. No other symptomatology      Patient Care Team:  Cecille Chavez DO as PCP - General (Family Medicine)    Review of  Systems  Medical History Reviewed by provider this encounter:  Tobacco  Allergies  Meds  Problems  Med Hx  Surg Hx  Fam Hx  Soc   Hx      Annual Wellness Visit Questionnaire   Sanjana is here for her Subsequent Wellness visit.     Health Risk Assessment:   Patient rates overall health as excellent. Patient feels that their physical health rating is much better. Patient is satisfied with their life. Eyesight was rated as slightly worse. Hearing was rated as same. Patient feels that their emotional and mental health rating is same. Patients states they are never, rarely angry. Patient states they are sometimes unusually tired/fatigued. Pain experienced in the last 7 days has been a lot. Patient's pain rating has been 5/10. Patient states that she has experienced no weight loss or gain in last 6 months.     Depression Screening:   PHQ-9 Score: 2      Fall Risk Screening:   In the past year, patient has experienced: no history of falling in past year      Urinary Incontinence Screening:   Patient has not leaked urine accidently in the last six months.     Home Safety:  Patient does not have trouble with stairs inside or outside of their home. Patient has no working smoke alarms and has working carbon monoxide detector. Home safety hazards include: none.     Nutrition:   Current diet is Low Cholesterol, Low Saturated Fat, Low Carb, Limited junk food and Other (please comment). Vegan [place] diet, no sugar    Medications:   Patient is currently taking over-the-counter supplements. OTC medications include: see medication list. Patient is able to manage medications.     Activities of Daily Living (ADLs)/Instrumental Activities of Daily Living (IADLs):   Walk and transfer into and out of bed and chair?: Yes  Dress and groom yourself?: Yes    Bathe or shower yourself?: Yes    Feed yourself? Yes  Do your laundry/housekeeping?: Yes  Manage your money, pay your bills and track your expenses?: Yes  Make your own meals?: Yes  "   Do your own shopping?: Yes    Previous Hospitalizations:   Any hospitalizations or ED visits within the last 12 months?: No      Advance Care Planning:   Living will: No    Durable POA for healthcare: No    Advanced directive: No      PREVENTIVE SCREENINGS      Cardiovascular Screening:    General: Screening Not Indicated and History Lipid Disorder      Diabetes Screening:     General: Screening Current      Colorectal Cancer Screening:     General: Screening Current      Breast Cancer Screening:     General: Screening Current      Cervical Cancer Screening:    General: Screening Current      Lung Cancer Screening:     General: Screening Not Indicated      Hepatitis C Screening:    General: Screening Current    Screening, Brief Intervention, and Referral to Treatment (SBIRT)    Screening  Typical number of drinks in a day: 0  Typical number of drinks in a week: 0  Interpretation: Low risk drinking behavior.    AUDIT-C Screenin) How often did you have a drink containing alcohol in the past year? never  2) How many drinks did you have on a typical day when you were drinking in the past year? 0  3) How often did you have 6 or more drinks on one occasion in the past year? never    AUDIT-C Score: 0  Interpretation: Score 0-2 (female): Negative screen for alcohol misuse    Single Item Drug Screening:  How often have you used an illegal drug (including marijuana) or a prescription medication for non-medical reasons in the past year? daily or almost daily    Single Item Drug Screen Score: 4  Interpretation: POSITIVE screen for possible drug use disorder    Drug Abuse Screening Test (DAST-10):  1) Have you used drugs other than those required for medical reasons? No  2) Do you abuse more than one drug at a time? No  3) Are you always able to stop using drugs when you want to? Yes  4) Have you had \"blackouts\" or \"flashbacks\" as a result of drug use? No  5) Do you ever feel bad or guilty about your drug use? No  6) " Does your spouse (or parents) ever complain about your involvement with drugs? No  7) Have you neglected your family because of your use of drugs? No  8) Have you engaged in illegal activities in order to obtain drugs? No  9) Have you ever experienced withdrawal symptoms (felt sick) when you stopped taking drugs? No  10) Have you had medical problems as a result of your drug use (e.g., memory loss, hepatitis, convulsions, bleeding, etc.)? No    DAST-10 Score: 0  Interpretation: No problems reported    Review of Current Opioid Use    Opioid Risk Tool (ORT) Interpretation: Complete Opioid Risk Tool (ORT)    Social Drivers of Health     Financial Resource Strain: Low Risk  (9/27/2022)    Overall Financial Resource Strain (CARDIA)    • Difficulty of Paying Living Expenses: Not hard at all   Food Insecurity: No Food Insecurity (12/9/2024)    Hunger Vital Sign    • Worried About Running Out of Food in the Last Year: Never true    • Ran Out of Food in the Last Year: Never true   Transportation Needs: No Transportation Needs (12/9/2024)    PRAPARE - Transportation    • Lack of Transportation (Medical): No    • Lack of Transportation (Non-Medical): No   Housing Stability: Low Risk  (12/9/2024)    Housing Stability Vital Sign    • Unable to Pay for Housing in the Last Year: No    • Number of Times Moved in the Last Year: 0    • Homeless in the Last Year: No   Utilities: Not At Risk (12/9/2024)    [facility] Utilities    • Threatened with loss of utilities: No     No results found.    Objective   /80   Pulse 68   Temp 98 °F (36.7 °C)   Wt 61.8 kg (136 lb 3.2 oz)   SpO2 99%   BMI 20.11 kg/m²     Physical Exam  Vitals and nursing note reviewed.   Constitutional:       General: She is not in acute distress.     Appearance: Normal appearance. She is not ill-appearing, toxic-appearing or diaphoretic.   HENT:      Head: Normocephalic and atraumatic.      Right Ear: Tympanic membrane normal.      Left Ear: Tympanic membrane  normal.      Nose: Nose normal.      Mouth/Throat:      Mouth: Mucous membranes are moist.      Pharynx: No posterior oropharyngeal erythema.   Eyes:      Extraocular Movements: Extraocular movements intact.      Conjunctiva/sclera: Conjunctivae normal.      Pupils: Pupils are equal, round, and reactive to light.   Cardiovascular:      Rate and Rhythm: Normal rate and regular rhythm.      Heart sounds: No murmur heard.  Pulmonary:      Effort: Pulmonary effort is normal.      Breath sounds: Normal breath sounds.   Abdominal:      General: Abdomen is flat. Bowel sounds are normal.      Palpations: Abdomen is soft.   Musculoskeletal:      Cervical back: Normal range of motion and neck supple.      Right lower leg: No edema.      Left lower leg: No edema.   Lymphadenopathy:      Cervical: No cervical adenopathy.   Skin:     General: Skin is warm and dry.      Findings: No rash.   Neurological:      General: No focal deficit present.      Mental Status: She is alert and oriented to person, place, and time.   Psychiatric:         Mood and Affect: Mood normal.

## 2024-12-11 NOTE — ASSESSMENT & PLAN NOTE
Her cmp and A1c were normal on recent labs  She is requesting fasting insulin levels which I was hesitant to order as it will not really  but she feels that it helps her with her diet so order placed.   Orders:  •  Insulin (2 Specimens); Future

## 2024-12-11 NOTE — ASSESSMENT & PLAN NOTE
Had cbc, iron and ferritin done with recent labs.   Will get vitamin D when she goes for her repeat liver enzymes.   Orders:  •  Vitamin D 25 hydroxy; Future

## 2024-12-12 ENCOUNTER — TELEMEDICINE (OUTPATIENT)
Dept: PSYCHIATRY | Facility: CLINIC | Age: 54
End: 2024-12-12
Payer: COMMERCIAL

## 2024-12-12 DIAGNOSIS — F41.1 GENERALIZED ANXIETY DISORDER: Primary | ICD-10-CM

## 2024-12-12 DIAGNOSIS — G47.00 INSOMNIA, UNSPECIFIED TYPE: ICD-10-CM

## 2024-12-12 DIAGNOSIS — F06.31 MOOD DISORDER DUE TO KNOWN PHYSIOLOGICAL CONDITION WITH DEPRESSIVE FEATURES: ICD-10-CM

## 2024-12-12 DIAGNOSIS — F33.9 RECURRENT MAJOR DEPRESSIVE DISORDER, REMISSION STATUS UNSPECIFIED (HCC): ICD-10-CM

## 2024-12-12 PROCEDURE — 99214 OFFICE O/P EST MOD 30 MIN: CPT | Performed by: STUDENT IN AN ORGANIZED HEALTH CARE EDUCATION/TRAINING PROGRAM

## 2024-12-12 RX ORDER — DOXEPIN HYDROCHLORIDE 10 MG/1
10 CAPSULE ORAL
Qty: 30 CAPSULE | Refills: 2 | Status: SHIPPED | OUTPATIENT
Start: 2024-12-12 | End: 2025-03-12

## 2024-12-12 RX ORDER — DULOXETIN HYDROCHLORIDE 60 MG/1
CAPSULE, DELAYED RELEASE ORAL
Qty: 90 CAPSULE | Refills: 0 | Status: SHIPPED | OUTPATIENT
Start: 2024-12-12

## 2024-12-12 RX ORDER — BUSPIRONE HYDROCHLORIDE 30 MG/1
30 TABLET ORAL 2 TIMES DAILY
Qty: 180 TABLET | Refills: 0 | Status: SHIPPED | OUTPATIENT
Start: 2024-12-12 | End: 2025-03-12

## 2024-12-12 RX ORDER — ARIPIPRAZOLE 10 MG/1
10 TABLET ORAL
Qty: 90 TABLET | Refills: 0 | Status: SHIPPED | OUTPATIENT
Start: 2024-12-12 | End: 2025-03-12

## 2024-12-13 NOTE — PSYCH
MEDICATION MANAGEMENT NOTE        [facility]      Name and Date of Birth:  Sanjana Mares 54 y.o. 1970 MRN: [id number]    Date of Visit: December 12, 2024    Reason for Visit: Follow-up visit for medication management     Virtual Visit Disclaimer:       TeleMed provider: Surekha Del Real D.O.    Location: [place]     Verification of patient location:     Patient is currently located in the [facility]  Patient is currently located in a state in which I am licensed     After connecting through [employer], the patient was identified by name and date of birth.  Sanjana Mares was informed that this is a telemedicine visit that is being conducted through [employer], and the patient was informed that this is a secure, HIPAA-compliant platform. My office door was closed. No one else was in the room. Sanjana Mares acknowledged consent and understanding of privacy and security of the video platform. Sanjana understands that the online visit is based solely on information provided by the patient, and that, in the absence of a face-to-face physical evaluation by the physician, the diagnosis Sanjana  receives is both limited and provisional in terms of accuracy and completeness. Sanjana Mares understands that they can discontinue the visit at any time. I informed Sanjana that I have reviewed their record in [facility] and presented the opportunity for them to ask any questions regarding the visit today. Sanjana Mares voiced understanding and consented to these terms. Sanjana is aware this is a billable service. Sanjana is present at her home and primary address      SUBJECTIVE:    Sanjana Mares is a 54 y.o. female with past psychiatric history significant for MDD, SHAD who was personally seen and evaluated today at the [facility] for follow-up and medication management. Sanjana denied SI, HI, AVH, delusions, cosme since her last appointment.   She endorsed significant improvement since her last appointment and after discussion of risks, benefits, potential side effects, alternatives, we will remain on current regimen as this without any changes due to its effectiveness.  Patient denies acute mental complaints concerns at this time      Current Rating Scores:     None completed today.    Review Of Systems:      Constitutional negative   ENT negative   Cardiovascular negative   Respiratory negative   Gastrointestinal negative   Genitourinary negative   Musculoskeletal negative   Integumentary negative   Neurological negative   Endocrine negative   Other Symptoms none, all other systems are negative       Past Psychiatric History: (unchanged information from previous note copied and italicized) - Information that is bolded has been updated.     See intake    Substance Abuse History: (unchanged information from previous note copied and italicized) - Information that is bolded has been updated.     See intake    Social History: (unchanged information from previous note copied and italicized) - Information that is bolded has been updated.     See intake    Traumatic History: (unchanged information from previous note copied and italicized) - Information that is bolded has been updated.       See intake    Past Medical History:    Past Medical History:   Diagnosis Date    Allergic     Anemia     Anxiety     Asthma     allergy induced    Depression     Eating disorder     prior to her gastric bypass was an over-eater    Headache(784.0)     Iron deficiency     Migraine     Obesity     Positive colorectal cancer screening using Cologuard test 2023    RSD (reflex sympathetic dystrophy)     Thalassemia minor     TIA (transient ischemic attack)     reaction to baclofen??        Past Surgical History:   Procedure Laterality Date     SECTION      ,     CHOLECYSTECTOMY      GASTRIC BYPASS N/A 2005    TONSILLECTOMY       Allergies   Allergen  "Reactions    Baclofen Confusion     TIA    Coconut Oil - Food Allergy Swelling    Imitrex [Sumatriptan] Chest Pain       Substance Abuse History:    Social History     Substance and Sexual Activity   Alcohol Use Not Currently     Social History     Substance and Sexual Activity   Drug Use Yes    Frequency: 5.0 times per week    Types: Marijuana    Comment: medical marijuana       Social History:    Social History     Socioeconomic History    Marital status: Single     Spouse name: Not on file    Number of children: Not on file    Years of education: Not on file    Highest education level: Not on file   Occupational History    Not on file   Tobacco Use    Smoking status: Former     Current packs/day: 0.00     Average packs/day: 1 pack/day for 27.3 years (27.3 ttl pk-yrs)     Types: Cigarettes     Start date: 1/1/1982     Quit date: 5/1/2009     Years since quitting: 15.6    Smokeless tobacco: Never   Vaping Use    Vaping status: Never Used   Substance and Sexual Activity    Alcohol use: Not Currently    Drug use: Yes     Frequency: 5.0 times per week     Types: Marijuana     Comment: medical marijuana    Sexual activity: Not Currently     Partners: Male     Birth control/protection: Condom Male   Other Topics Concern    Not on file   Social History Narrative        2 children who are recovering addicts.     Daughter lives in [place] and son lives with her    Is a \"Universal \"     Social Drivers of Health     Financial Resource Strain: Low Risk  (9/27/2022)    Overall Financial Resource Strain (CARDIA)     Difficulty of Paying Living Expenses: Not hard at all   Food Insecurity: No Food Insecurity (12/9/2024)    Hunger Vital Sign     Worried About Running Out of Food in the Last Year: Never true     Ran Out of Food in the Last Year: Never true   Transportation Needs: No Transportation Needs (12/9/2024)    PRAPARE - Transportation     Lack of Transportation (Medical): No     Lack of Transportation " "(Non-Medical): No   Physical Activity: Not on file   Stress: Not on file   Social Connections: Not on file   Intimate Partner Violence: Not on file   Housing Stability: Low Risk  (12/9/2024)    Housing Stability Vital Sign     Unable to Pay for Housing in the Last Year: No     Number of Times Moved in the Last Year: 0     Homeless in the Last Year: No       Family Psychiatric History:     Family History   Problem Relation Age of Onset    Mental illness Mother     Depression Mother     Diabetes Mother         Actually this is moms mom    ADD / ADHD Mother     Anxiety disorder Mother     Heart disease Father     Cancer Father 40        Oral cancer    Asthma Daughter     Autoimmune disease Daughter     No Known Problems Maternal Grandmother     No Known Problems Maternal Grandfather     No Known Problems Paternal Grandmother     No Known Problems Paternal Grandfather     Alcohol abuse Brother     Completed Suicide  Brother     Substance Abuse Brother     Drug abuse Brother     Psychiatric Illness Brother     Schizophrenia Brother     No Known Problems Brother     Substance Abuse Son        History Review: The following portions of the patient's history were reviewed and updated as appropriate: allergies, current medications, past family history, past medical history, past social history, past surgical history, and problem list.         OBJECTIVE:     Vital signs in last 24 hours:    There were no vitals filed for this visit.    Mental Status Evaluation:    Appearance age appropriate, casually dressed   Behavior Cooperative, calm   Speech normal rate, normal volume, normal pitch   Mood \"Good\"   Affect Euthymic   Thought Processes organized, goal directed   Associations intact associations   Thought Content no overt delusions   Perceptual Disturbances: no auditory hallucinations, no visual hallucinations   Abnormal Thoughts  Risk Potential Suicidal ideation - None  Homicidal ideation - None  Potential for aggression - No "   Orientation oriented to person, place, time/date, and situation   Memory recent and remote memory grossly intact   Consciousness alert and awake   Attention Span Concentration Span attention span and concentration are age appropriate   Intellect appears to be of average intelligence   Insight intact   Judgement intact   Muscle Strength and  Gait unable to assess today due to virtual visit   Motor activity unable to assess today due to virtual visit   Language no difficulty naming common objects, no difficulty repeating a phrase   Fund of Knowledge adequate knowledge of current events  adequate fund of knowledge regarding past history  adequate fund of knowledge regarding vocabulary    Pain none   Pain Scale Did not ask patient to formally rate       Laboratory Results: I have personally reviewed all pertinent laboratory/tests results    Recent Labs (last 2 months):   Telemedicine on 11/21/2024   Component Date Value    Vitamin B-12 11/29/2024 >2,000 (H)     FOLATE, SERUM 11/29/2024 >20.0     Ferritin 11/29/2024 22     Methylmalonic Acid, S 11/29/2024 144    Patient Message on 11/07/2024   Component Date Value    Hemoglobin A1C 11/15/2024 5.0     Estimated Average Glucose 11/15/2024 97     TSH 11/15/2024 1.480     Cholesterol, Total 11/15/2024 213 (H)     Triglycerides 11/15/2024 117     HDL 11/15/2024 91     VLDL Cholesterol Calcula* 11/15/2024 20     LDL Calculated 11/15/2024 102 (H)     T. Chol/HDL Ratio 11/15/2024 2.3     Glucose, Random 11/15/2024 88     BUN 11/15/2024 12     Creatinine 11/15/2024 0.71     eGFR 11/15/2024 100     SL AMB BUN/CREATININE RA* 11/15/2024 17     Sodium 11/15/2024 138     Potassium 11/15/2024 4.1     Chloride 11/15/2024 103     CO2 11/15/2024 18 (L)     CALCIUM 11/15/2024 9.4     Protein, Total 11/15/2024 6.5     Albumin 11/15/2024 4.1     Globulin, Total 11/15/2024 2.4     TOTAL BILIRUBIN 11/15/2024 0.3     Alk Phos Isoenzymes 11/15/2024 140 (H)     AST 11/15/2024 63 (H)     ALT  11/15/2024 86 (H)     White Blood Cell Count 11/15/2024 8.4     Red Blood Cell Count 11/15/2024 5.36 (H)     Hemoglobin 11/15/2024 11.1     HCT 11/15/2024 37.3     MCV 11/15/2024 70 (L)     MCH 11/15/2024 20.7 (L)     MCHC 11/15/2024 29.8 (L)     RDW 11/15/2024 16.6 (H)     Platelet Count 11/15/2024 443     Neutrophils 11/15/2024 36     Lymphocytes 11/15/2024 45     Monocytes 11/15/2024 5     Eosinophils 11/15/2024 13     Basophils PCT 11/15/2024 1     Neutrophils (Absolute) 11/15/2024 3.0     Lymphocytes (Absolute) 11/15/2024 3.8 (H)     Monocytes (Absolute) 11/15/2024 0.4     Eosinophils (Absolute) 11/15/2024 1.1 (H)     Basophils ABS 11/15/2024 0.1     Immature Granulocytes 11/15/2024 0     Immature Granulocytes (A* 11/15/2024 0.0     Iron, Serum 11/15/2024 157        Suicide/Homicide Risk Assessment:    Risk of Harm to Self:  The following ratings are based on assessment at the time of the interview  Historical Risk Factors include: chronic psychiatric problems  Protective Factors: no current suicidal ideation, compliant with medications, compliant with mental health treatment, good self-esteem, having a desire to live, stable living environment, strong relationships    Risk of Harm to Others:  The following ratings are based on assessment at the time of the interview  Historical Risk Factors include: none.  Protective Factors: no current homicidal ideation    The following interventions are recommended: contracts for safety at present - agrees to go to ED if feeling unsafe, contracts for safety at present - agrees to call Crisis Intervention Service if feeling unsafe      Lethality Statement:    Based on today's assessment and clinical criteria, Sanjana Mares contracts for safety and is not an imminent risk of harm to self or others. Outpatient level of care is deemed appropriate at this current time. Sanjana understands that if they can no longer contract for safety, they need to call the office or report to  their nearest Emergency Room for immediate evaluation. They voiced understanding and agreement to call 911 or head to the nearest ED should they have any physical or mental decompensation whatsoever.       Assessment/Plan:     1.)  MDD  2.)  SHAD  3.)      After discussion of risks, benefits, potential side effects, alternatives, we will continue duloxetine 60 mg daily, BuSpar 30 mg twice daily, doxepin 10 mg nightly as needed for insomnia.  Patient denies acute mental complaints or concerns at this time      Medication management every 3 months  Aware of 24 hour and weekend coverage for urgent situations accessed by calling [facility] main practice number    Medications Risks/Benefits      Risks, Benefits And Possible Side Effects Of Medications:    Risks, benefits, and possible side effects of medications explained to Sanjana and she verbalizes understanding and agreement for treatment.    Controlled Medication Discussion:     Sanjana has been filling controlled prescriptions on time as prescribed according to [place] Prescription Drug Monitoring Program    Psychotherapy Provided:     Individual psychotherapy provided: Crisis/safety plan discussed with Sanjana.     Treatment Plan:    Completed and signed during the session:  We will complete at next appointment due to lack of time      Visit Time    Visit Start Time: 10:30 AM  Visit Stop Time: 10:50 AM  Total Visit Duration:  20 minutes     The total visit duration detailed above includes: patient engagement, medication management, psychotherapy/counseling, discussion regarding treatment goals, documentation, review of past medical records, and coordination of care.      Note Share Disclaimer:     This note was not shared with the patient due to reasonable likelihood of causing patient harm      Surekha Del Real DO  Psychiatry  12/12/24

## 2024-12-20 ENCOUNTER — TELEPHONE (OUTPATIENT)
Dept: PSYCHIATRY | Facility: CLINIC | Age: 54
End: 2024-12-20

## 2025-03-17 DIAGNOSIS — I10 ESSENTIAL HYPERTENSION: ICD-10-CM

## 2025-03-18 RX ORDER — HYDROCHLOROTHIAZIDE 25 MG/1
25 TABLET ORAL DAILY
Qty: 30 TABLET | Refills: 5 | Status: SHIPPED | OUTPATIENT
Start: 2025-03-18

## 2025-03-26 ENCOUNTER — TELEPHONE (OUTPATIENT)
Dept: PSYCHIATRY | Facility: CLINIC | Age: 55
End: 2025-03-26

## 2025-03-26 ENCOUNTER — TELEMEDICINE (OUTPATIENT)
Dept: PSYCHIATRY | Facility: CLINIC | Age: 55
End: 2025-03-26
Payer: COMMERCIAL

## 2025-03-26 DIAGNOSIS — F33.9 RECURRENT MAJOR DEPRESSIVE DISORDER, REMISSION STATUS UNSPECIFIED (HCC): ICD-10-CM

## 2025-03-26 DIAGNOSIS — F41.1 GENERALIZED ANXIETY DISORDER: Primary | ICD-10-CM

## 2025-03-26 DIAGNOSIS — F06.31 MOOD DISORDER DUE TO KNOWN PHYSIOLOGICAL CONDITION WITH DEPRESSIVE FEATURES: ICD-10-CM

## 2025-03-26 PROCEDURE — 99214 OFFICE O/P EST MOD 30 MIN: CPT | Performed by: STUDENT IN AN ORGANIZED HEALTH CARE EDUCATION/TRAINING PROGRAM

## 2025-03-26 PROCEDURE — 90833 PSYTX W PT W E/M 30 MIN: CPT | Performed by: STUDENT IN AN ORGANIZED HEALTH CARE EDUCATION/TRAINING PROGRAM

## 2025-04-07 RX ORDER — ARIPIPRAZOLE 10 MG/1
10 TABLET ORAL
Qty: 90 TABLET | Refills: 0 | Status: SHIPPED | OUTPATIENT
Start: 2025-04-07 | End: 2025-07-06

## 2025-04-07 RX ORDER — DULOXETIN HYDROCHLORIDE 60 MG/1
CAPSULE, DELAYED RELEASE ORAL
Qty: 90 CAPSULE | Refills: 0 | Status: SHIPPED | OUTPATIENT
Start: 2025-04-07

## 2025-04-07 RX ORDER — BUSPIRONE HYDROCHLORIDE 30 MG/1
30 TABLET ORAL 2 TIMES DAILY
Qty: 180 TABLET | Refills: 0 | Status: SHIPPED | OUTPATIENT
Start: 2025-04-07 | End: 2025-07-06

## 2025-04-07 NOTE — PSYCH
MEDICATION MANAGEMENT NOTE        [facility]      Name and Date of Birth:  Sanjana Mares 54 y.o. 1970 MRN: [id number]    Date of Visit: April 7, 2025    Reason for Visit: Follow-up visit for medication management     Virtual Visit Disclaimer:       TeleMed provider: Surekha Del Real D.O.    Location: [place]     Verification of patient location:     Patient is currently located in the [facility]  Patient is currently located in a state in which I am licensed     After connecting through [employer], the patient was identified by name and date of birth.  Sanjana Mares was informed that this is a telemedicine visit that is being conducted through [employer], and the patient was informed that this is a secure, HIPAA-compliant platform. My office door was closed. No one else was in the room. Sanjana Mares acknowledged consent and understanding of privacy and security of the video platform. Sanjana understands that the online visit is based solely on information provided by the patient, and that, in the absence of a face-to-face physical evaluation by the physician, the diagnosis Sanjana  receives is both limited and provisional in terms of accuracy and completeness. Sanjana Mares understands that they can discontinue the visit at any time. I informed Sanjana that I have reviewed their record in [facility] and presented the opportunity for them to ask any questions regarding the visit today. Sanjana Mares voiced understanding and consented to these terms. Sanjana is aware this is a billable service. Sanjana is present at her home and primary address      SUBJECTIVE:    Sanjana Mares is a 54 y.o. female with past psychiatric history significant for MDD, SHAD who was personally seen and evaluated today at the [facility] outpatient clinic for follow-up and medication management. Sanjana denied SI, HI, AVH, delusions, cosme since her last appointment.  After  discussion of risks, benefits, potential side effects, alternatives, we will remain on current regimen as this without any changes due to its effectiveness.  Patient denies acute mental complaints concerns at this time      Current Rating Scores:     None completed today.    Review Of Systems:      Constitutional negative   ENT negative   Cardiovascular negative   Respiratory negative   Gastrointestinal negative   Genitourinary negative   Musculoskeletal negative   Integumentary negative   Neurological negative   Endocrine negative   Other Symptoms none, all other systems are negative       Past Psychiatric History: (unchanged information from previous note copied and italicized) - Information that is bolded has been updated.     See intake    Substance Abuse History: (unchanged information from previous note copied and italicized) - Information that is bolded has been updated.     See intake    Social History: (unchanged information from previous note copied and italicized) - Information that is bolded has been updated.     See intake    Traumatic History: (unchanged information from previous note copied and italicized) - Information that is bolded has been updated.       See intake    Past Medical History:    Past Medical History:   Diagnosis Date    Allergic     Anemia     Anxiety     Asthma     allergy induced    Depression     Eating disorder     prior to her gastric bypass was an over-eater    Headache(784.0)     Iron deficiency     Migraine     Obesity     Positive colorectal cancer screening using Cologuard test 2023    RSD (reflex sympathetic dystrophy)     Thalassemia minor     TIA (transient ischemic attack)     reaction to baclofen??        Past Surgical History:   Procedure Laterality Date     SECTION      ,     CHOLECYSTECTOMY      GASTRIC BYPASS N/A 2005    TONSILLECTOMY       Allergies   Allergen Reactions    Baclofen Confusion     TIA    Coconut Oil - Food Allergy  "Swelling    Imitrex [Sumatriptan] Chest Pain       Substance Abuse History:    Social History     Substance and Sexual Activity   Alcohol Use Not Currently     Social History     Substance and Sexual Activity   Drug Use Yes    Frequency: 5.0 times per week    Types: Marijuana    Comment: medical marijuana       Social History:    Social History     Socioeconomic History    Marital status: Single     Spouse name: Not on file    Number of children: Not on file    Years of education: Not on file    Highest education level: Not on file   Occupational History    Not on file   Tobacco Use    Smoking status: Former     Current packs/day: 0.00     Average packs/day: 1 pack/day for 27.3 years (27.3 ttl pk-yrs)     Types: Cigarettes     Start date: 1/1/1982     Quit date: 5/1/2009     Years since quitting: 15.9    Smokeless tobacco: Never   Vaping Use    Vaping status: Never Used   Substance and Sexual Activity    Alcohol use: Not Currently    Drug use: Yes     Frequency: 5.0 times per week     Types: Marijuana     Comment: medical marijuana    Sexual activity: Not Currently     Partners: Male     Birth control/protection: Condom Male   Other Topics Concern    Not on file   Social History Narrative        2 children who are recovering addicts.     Daughter lives in [place] and son lives with her    Is a \"Universal \"     Social Drivers of Health     Financial Resource Strain: Low Risk  (9/27/2022)    Overall Financial Resource Strain (CARDIA)     Difficulty of Paying Living Expenses: Not hard at all   Food Insecurity: No Food Insecurity (12/9/2024)    Hunger Vital Sign     Worried About Running Out of Food in the Last Year: Never true     Ran Out of Food in the Last Year: Never true   Transportation Needs: No Transportation Needs (12/9/2024)    PRAPARE - Transportation     Lack of Transportation (Medical): No     Lack of Transportation (Non-Medical): No   Physical Activity: Not on file   Stress: Not on file " "  Social Connections: Not on file   Intimate Partner Violence: Not on file   Housing Stability: Low Risk  (12/9/2024)    Housing Stability Vital Sign     Unable to Pay for Housing in the Last Year: No     Number of Times Moved in the Last Year: 0     Homeless in the Last Year: No       Family Psychiatric History:     Family History   Problem Relation Age of Onset    Mental illness Mother     Depression Mother     Diabetes Mother         Actually this is moms mom    ADD / ADHD Mother     Anxiety disorder Mother     Heart disease Father     Cancer Father 40        Oral cancer    Asthma Daughter     Autoimmune disease Daughter     No Known Problems Maternal Grandmother     No Known Problems Maternal Grandfather     No Known Problems Paternal Grandmother     No Known Problems Paternal Grandfather     Alcohol abuse Brother     Completed Suicide  Brother     Substance Abuse Brother     Drug abuse Brother     Psychiatric Illness Brother     Schizophrenia Brother     No Known Problems Brother     Substance Abuse Son        History Review: The following portions of the patient's history were reviewed and updated as appropriate: allergies, current medications, past family history, past medical history, past social history, past surgical history, and problem list.         OBJECTIVE:     Vital signs in last 24 hours:    There were no vitals filed for this visit.    Mental Status Evaluation:    Appearance age appropriate, casually dressed   Behavior Cooperative, calm   Speech normal rate, normal volume, normal pitch   Mood \"Good\"   Affect Euthymic   Thought Processes organized, goal directed   Associations intact associations   Thought Content no overt delusions   Perceptual Disturbances: no auditory hallucinations, no visual hallucinations   Abnormal Thoughts  Risk Potential Suicidal ideation - None  Homicidal ideation - None  Potential for aggression - No   Orientation oriented to person, place, time/date, and situation "   Memory recent and remote memory grossly intact   Consciousness alert and awake   Attention Span Concentration Span attention span and concentration are age appropriate   Intellect appears to be of average intelligence   Insight intact   Judgement intact   Muscle Strength and  Gait unable to assess today due to virtual visit   Motor activity unable to assess today due to virtual visit   Language no difficulty naming common objects, no difficulty repeating a phrase   Fund of Knowledge adequate knowledge of current events  adequate fund of knowledge regarding past history  adequate fund of knowledge regarding vocabulary    Pain none   Pain Scale Did not ask patient to formally rate       Laboratory Results: I have personally reviewed all pertinent laboratory/tests results    Recent Labs (last 2 months):   No visits with results within 2 Month(s) from this visit.   Latest known visit with results is:   Telemedicine on 11/21/2024   Component Date Value    Vitamin B-12 11/29/2024 >2,000 (H)     FOLATE, SERUM 11/29/2024 >20.0     Ferritin 11/29/2024 22     Methylmalonic Acid, S 11/29/2024 144        Suicide/Homicide Risk Assessment:    Risk of Harm to Self:  The following ratings are based on assessment at the time of the interview  Historical Risk Factors include: chronic psychiatric problems  Protective Factors: no current suicidal ideation, compliant with medications, compliant with mental health treatment, good self-esteem, having a desire to live, stable living environment, strong relationships    Risk of Harm to Others:  The following ratings are based on assessment at the time of the interview  Historical Risk Factors include: none.  Protective Factors: no current homicidal ideation    The following interventions are recommended: contracts for safety at present - agrees to go to ED if feeling unsafe, contracts for safety at present - agrees to call Crisis Intervention Service if feeling unsafe      Lethality  Statement:    Based on today's assessment and clinical criteria, Sanjana Mares contracts for safety and is not an imminent risk of harm to self or others. Outpatient level of care is deemed appropriate at this current time. Sanjana understands that if they can no longer contract for safety, they need to call the office or report to their nearest Emergency Room for immediate evaluation. They voiced understanding and agreement to call 911 or head to the nearest ED should they have any physical or mental decompensation whatsoever.       Assessment/Plan:     1.)  MDD  2.)  SHAD  3.)      After discussion of risks, benefits, potential side effects, alternatives, we will continue Abilify 10 mg daily, duloxetine 60 mg daily, BuSpar 30 mg twice daily, doxepin 10 mg nightly as needed for insomnia.  Patient denies acute mental complaints or concerns at this time      Medication management every 3 months  Aware of 24 hour and weekend coverage for urgent situations accessed by calling [facility] main practice number    Medications Risks/Benefits      Risks, Benefits And Possible Side Effects Of Medications:    Risks, benefits, and possible side effects of medications explained to Sanjana and she verbalizes understanding and agreement for treatment.    Controlled Medication Discussion:     Sanjana has been filling controlled prescriptions on time as prescribed according to [place] Prescription Drug Monitoring Program    Psychotherapy Provided:     Individual psychotherapy provided: Crisis/safety plan discussed with Sanjana. Provided at least 16 minutes of distinct psychotherapy using a combination of supportive, interpersonal, motivational, and problem solving approaches to address psychological distress and enhance coping strategies.     Treatment Plan:    Completed and signed during the session:  We will complete at next appointment due to lack of time      Visit Time    Visit Start Time: 1:00 PM  Visit  Stop Time: 1:28 PM  Total Visit Duration:  28 minutes     The total visit duration detailed above includes: patient engagement, medication management, psychotherapy/counseling, discussion regarding treatment goals, documentation, review of past medical records, and coordination of care.      Note Share Disclaimer:     This note was not shared with the patient due to reasonable likelihood of causing patient harm      Surekha Del Real DO  Psychiatry  04/07/25

## 2025-06-26 ENCOUNTER — TELEMEDICINE (OUTPATIENT)
Dept: PSYCHIATRY | Facility: CLINIC | Age: 55
End: 2025-06-26
Payer: COMMERCIAL

## 2025-06-26 DIAGNOSIS — F33.9 RECURRENT MAJOR DEPRESSIVE DISORDER, REMISSION STATUS UNSPECIFIED (HCC): ICD-10-CM

## 2025-06-26 DIAGNOSIS — F41.1 GENERALIZED ANXIETY DISORDER: ICD-10-CM

## 2025-06-26 DIAGNOSIS — R63.4 ABNORMAL LOSS OF WEIGHT: ICD-10-CM

## 2025-06-26 DIAGNOSIS — G47.00 INSOMNIA, UNSPECIFIED TYPE: Primary | ICD-10-CM

## 2025-06-26 DIAGNOSIS — F06.31 MOOD DISORDER DUE TO KNOWN PHYSIOLOGICAL CONDITION WITH DEPRESSIVE FEATURES: ICD-10-CM

## 2025-06-26 PROCEDURE — 90833 PSYTX W PT W E/M 30 MIN: CPT | Performed by: STUDENT IN AN ORGANIZED HEALTH CARE EDUCATION/TRAINING PROGRAM

## 2025-06-26 PROCEDURE — 99213 OFFICE O/P EST LOW 20 MIN: CPT | Performed by: STUDENT IN AN ORGANIZED HEALTH CARE EDUCATION/TRAINING PROGRAM

## 2025-06-26 RX ORDER — DOXEPIN 3 MG/1
3 TABLET, FILM COATED ORAL
Qty: 30 TABLET | Refills: 0 | Status: SHIPPED | OUTPATIENT
Start: 2025-06-26 | End: 2025-07-26

## 2025-06-30 RX ORDER — DULOXETIN HYDROCHLORIDE 60 MG/1
CAPSULE, DELAYED RELEASE ORAL
Qty: 90 CAPSULE | Refills: 0 | Status: SHIPPED | OUTPATIENT
Start: 2025-06-30

## 2025-06-30 RX ORDER — ARIPIPRAZOLE 10 MG/1
10 TABLET ORAL
Qty: 90 TABLET | Refills: 0 | Status: SHIPPED | OUTPATIENT
Start: 2025-06-30 | End: 2025-09-28

## 2025-06-30 RX ORDER — BUSPIRONE HYDROCHLORIDE 30 MG/1
30 TABLET ORAL 2 TIMES DAILY
Qty: 180 TABLET | Refills: 0 | Status: SHIPPED | OUTPATIENT
Start: 2025-06-30 | End: 2025-09-28

## 2025-07-01 NOTE — PSYCH
MEDICATION MANAGEMENT NOTE        [facility]      Name and Date of Birth:  Sanjana aMres 55 y.o. 1970 MRN: [id number]    Date of Visit: June 30, 2025    Reason for Visit: Follow-up visit for medication management     Virtual Visit Disclaimer:       TeleMed provider: Surekha Del Real D.O.    Location: [place]     Verification of patient location:     Patient is currently located in the [facility]  Patient is currently located in a state in which I am licensed     After connecting through [employer], the patient was identified by name and date of birth.  Sanjana Mares was informed that this is a telemedicine visit that is being conducted through [employer], and the patient was informed that this is a secure, HIPAA-compliant platform. My office door was closed. No one else was in the room. Sanjana Mares acknowledged consent and understanding of privacy and security of the video platform. Sanjana understands that the online visit is based solely on information provided by the patient, and that, in the absence of a face-to-face physical evaluation by the physician, the diagnosis Sanjana  receives is both limited and provisional in terms of accuracy and completeness. Sanjana Mares understands that they can discontinue the visit at any time. I informed Sanjana that I have reviewed their record in [facility] and presented the opportunity for them to ask any questions regarding the visit today. Sanjana Mares voiced understanding and consented to these terms. Sanjana is aware this is a billable service. Sanjana is present at her home and primary address      SUBJECTIVE:    Sanjana Mares is a 55 y.o. female with past psychiatric history significant for MDD, SHAD who was personally seen and evaluated today at the [facility] outpatient clinic for follow-up and medication management. Sanjana denied SI, HI, AVH, delusions, cosme since her last appointment.  After  discussion of risks, benefits, potential side effects, alternatives, we will remain on current regimen as this without any changes due to its effectiveness.  Patient denies acute mental complaints concerns at this time      Current Rating Scores:     None completed today.    Review Of Systems:      Constitutional negative aside from some tiredness and decreased appetite   ENT negative   Cardiovascular negative   Respiratory negative   Gastrointestinal negative aside from some diarrhea   Genitourinary negative   Musculoskeletal negative   Integumentary negative   Neurological negative   Endocrine negative   Other Symptoms none, all other systems are negative       Past Psychiatric History: (unchanged information from previous note copied and italicized) - Information that is bolded has been updated.     See intake    Substance Abuse History: (unchanged information from previous note copied and italicized) - Information that is bolded has been updated.     See intake    Social History: (unchanged information from previous note copied and italicized) - Information that is bolded has been updated.     See intake    Traumatic History: (unchanged information from previous note copied and italicized) - Information that is bolded has been updated.       See intake    Past Medical History:    Past Medical History:   Diagnosis Date    Allergic     Anemia     Anxiety     Asthma     allergy induced    Depression     Eating disorder     prior to her gastric bypass was an over-eater    Headache(784.0)     Iron deficiency     Migraine     Obesity     Positive colorectal cancer screening using Cologuard test 2023    RSD (reflex sympathetic dystrophy)     Thalassemia minor     TIA (transient ischemic attack)     reaction to baclofen??        Past Surgical History:   Procedure Laterality Date     SECTION      ,     CHOLECYSTECTOMY      GASTRIC BYPASS N/A 2005    TONSILLECTOMY       Allergies   Allergen  "Reactions    Baclofen Confusion     TIA    Coconut Oil - Food Allergy Swelling    Imitrex [Sumatriptan] Chest Pain       Substance Abuse History:    Social History     Substance and Sexual Activity   Alcohol Use Not Currently     Social History     Substance and Sexual Activity   Drug Use Yes    Frequency: 5.0 times per week    Types: Marijuana    Comment: medical marijuana       Social History:    Social History     Socioeconomic History    Marital status: Single     Spouse name: Not on file    Number of children: Not on file    Years of education: Not on file    Highest education level: Not on file   Occupational History    Not on file   Tobacco Use    Smoking status: Former     Current packs/day: 0.00     Average packs/day: 1 pack/day for 27.3 years (27.3 ttl pk-yrs)     Types: Cigarettes     Start date: 1982     Quit date: 2009     Years since quittin.1    Smokeless tobacco: Never   Vaping Use    Vaping status: Never Used   Substance and Sexual Activity    Alcohol use: Not Currently    Drug use: Yes     Frequency: 5.0 times per week     Types: Marijuana     Comment: medical marijuana    Sexual activity: Not Currently     Partners: Male     Birth control/protection: Condom Male   Other Topics Concern    Not on file   Social History Narrative        2 children who are recovering addicts.     Daughter lives in [place] and son lives with her    Is a \"Universal \"     Social Drivers of Health     Financial Resource Strain: Low Risk  (2022)    Overall Financial Resource Strain (CARDIA)     Difficulty of Paying Living Expenses: Not hard at all   Food Insecurity: No Food Insecurity (2024)    Nursing - Inadequate Food Risk Classification     Worried About Running Out of Food in the Last Year: Never true     Ran Out of Food in the Last Year: Never true     Ran Out of Food in the Last Year: Not on file   Transportation Needs: No Transportation Needs (2024)    PRAPARE - " "Transportation     Lack of Transportation (Medical): No     Lack of Transportation (Non-Medical): No   Physical Activity: Not on file   Stress: Not on file   Social Connections: Not on file   Intimate Partner Violence: Not on file   Housing Stability: Low Risk  (12/9/2024)    Housing Stability Vital Sign     Unable to Pay for Housing in the Last Year: No     Number of Times Moved in the Last Year: 0     Homeless in the Last Year: No       Family Psychiatric History:     Family History   Problem Relation Name Age of Onset    Mental illness Mother Glenn     Depression Mother Glenn     Diabetes Mother Glenn         Actually this is moms mom    ADD / ADHD Mother Glenn     Anxiety disorder Mother Glenn     Heart disease Father Prafulla     Cancer Father Prafulla 40        Oral cancer    Asthma Daughter Gloria     Autoimmune disease Daughter Gloria     No Known Problems Maternal Grandmother      No Known Problems Maternal Grandfather      No Known Problems Paternal Grandmother      No Known Problems Paternal Grandfather      Alcohol abuse Brother Juanjose     Completed Suicide  Brother Juanjose     Substance Abuse Brother Curtis     Drug abuse Brother Curtis     Psychiatric Illness Brother Curtis     Schizophrenia Brother Curtis     No Known Problems Brother      Substance Abuse Son Wolf        History Review: The following portions of the patient's history were reviewed and updated as appropriate: allergies, current medications, past family history, past medical history, past social history, past surgical history, and problem list.         OBJECTIVE:     Vital signs in last 24 hours:    There were no vitals filed for this visit.    Mental Status Evaluation:    Appearance age appropriate, casually dressed   Behavior Cooperative, calm   Speech normal rate, normal volume, normal pitch   Mood \"Good\"   Affect Euthymic   Thought Processes organized, goal directed   Associations intact associations   Thought " Content no overt delusions   Perceptual Disturbances: no auditory hallucinations, no visual hallucinations   Abnormal Thoughts  Risk Potential Suicidal ideation - None  Homicidal ideation - None  Potential for aggression - No   Orientation oriented to person, place, time/date, and situation   Memory recent and remote memory grossly intact   Consciousness alert and awake   Attention Span Concentration Span attention span and concentration are age appropriate   Intellect appears to be of average intelligence   Insight intact   Judgement intact   Muscle Strength and  Gait unable to assess today due to virtual visit   Motor activity unable to assess today due to virtual visit   Language no difficulty naming common objects, no difficulty repeating a phrase   Fund of Knowledge adequate knowledge of current events  adequate fund of knowledge regarding past history  adequate fund of knowledge regarding vocabulary    Pain none   Pain Scale Did not ask patient to formally rate       Laboratory Results: I have personally reviewed all pertinent laboratory/tests results    Recent Labs (last 2 months):   No visits with results within 2 Month(s) from this visit.   Latest known visit with results is:   Telemedicine on 11/21/2024   Component Date Value    Vitamin B-12 11/29/2024 >2,000 (H)     FOLATE, SERUM 11/29/2024 >20.0     Ferritin 11/29/2024 22     Methylmalonic Acid, S 11/29/2024 144        Suicide/Homicide Risk Assessment:    Risk of Harm to Self:  The following ratings are based on assessment at the time of the interview  Historical Risk Factors include: chronic psychiatric problems  Protective Factors: no current suicidal ideation, compliant with medications, compliant with mental health treatment, good self-esteem, having a desire to live, stable living environment, strong relationships    Risk of Harm to Others:  The following ratings are based on assessment at the time of the interview  Historical Risk Factors include:  none.  Protective Factors: no current homicidal ideation    The following interventions are recommended: contracts for safety at present - agrees to go to ED if feeling unsafe, contracts for safety at present - agrees to call Crisis Intervention Service if feeling unsafe      Lethality Statement:    Based on today's assessment and clinical criteria, Sanjana Mares contracts for safety and is not an imminent risk of harm to self or others. Outpatient level of care is deemed appropriate at this current time. Sanjana understands that if they can no longer contract for safety, they need to call the office or report to their nearest Emergency Room for immediate evaluation. They voiced understanding and agreement to call 911 or head to the nearest ED should they have any physical or mental decompensation whatsoever.       Assessment/Plan:     1.)  MDD  2.)  SHAD  3.)      After discussion of risks, benefits, potential side effects, alternatives, we will continue Abilify 10 mg daily, duloxetine 60 mg daily, BuSpar 30 mg twice daily, doxepin 10 mg nightly as needed for insomnia.  Patient denies acute mental complaints or concerns at this time.  Patient reports that she has lost some weight secondary to a bout of pneumonia for which she was prescribed antibiotics and will be given nutrition referral.  She denies acute complaints or concerns at this time      Medication management every 3 months  Aware of 24 hour and weekend coverage for urgent situations accessed by calling [facility] main practice number    Medications Risks/Benefits      Risks, Benefits And Possible Side Effects Of Medications:    Risks, benefits, and possible side effects of medications explained to Sanjana and she verbalizes understanding and agreement for treatment.    Controlled Medication Discussion:     Sanjana has been filling controlled prescriptions on time as prescribed according to [place] Prescription Drug Monitoring  Program    Psychotherapy Provided:     Individual psychotherapy provided: Crisis/safety plan discussed with Sanjana. Provided at least 16 minutes of distinct psychotherapy using a combination of supportive, interpersonal, motivational, and problem solving approaches to address psychological distress and enhance coping strategies.     Treatment Plan:    Completed and signed during the session: We will complete at next appointment due to lack of time      Visit Time    Visit Start Time: 11:30 AM  Visit Stop Time: 11:58 AM  Total Visit Duration: 28 minutes     The total visit duration detailed above includes: patient engagement, medication management, psychotherapy/counseling, discussion regarding treatment goals, documentation, review of past medical records, and coordination of care.      Note Share Disclaimer:     This note was not shared with the patient due to reasonable likelihood of causing patient harm      Surekha Del Real DO  Psychiatry  06/30/25

## 2025-07-02 ENCOUNTER — TELEPHONE (OUTPATIENT)
Dept: FAMILY MEDICINE CLINIC | Facility: CLINIC | Age: 55
End: 2025-07-02

## 2025-07-02 NOTE — TELEPHONE ENCOUNTER
Fax received form [place] requesting medical records for patient for a evaluating of Long Term disability.     Authorization for was not legible- called and left voice message for patient's agent Frances Jarquin to  to get a better copy.

## 2025-07-08 DIAGNOSIS — N95.1 VAGINAL DRYNESS, MENOPAUSAL: ICD-10-CM

## 2025-07-10 RX ORDER — ESTRADIOL 0.1 MG/G
CREAM VAGINAL
Qty: 42.5 G | Refills: 0 | Status: SHIPPED | OUTPATIENT
Start: 2025-07-10

## 2025-07-15 ENCOUNTER — TELEPHONE (OUTPATIENT)
Dept: PSYCHIATRY | Facility: CLINIC | Age: 55
End: 2025-07-15

## 2025-07-24 ENCOUNTER — TELEPHONE (OUTPATIENT)
Dept: PSYCHIATRY | Facility: CLINIC | Age: 55
End: 2025-07-24

## 2025-08-20 DIAGNOSIS — E53.8 B12 DEFICIENCY: ICD-10-CM

## 2025-08-20 DIAGNOSIS — D56.3 THALASSEMIA MINOR: ICD-10-CM

## 2025-08-20 DIAGNOSIS — E61.1 IRON DEFICIENCY: ICD-10-CM

## 2025-08-20 DIAGNOSIS — Z98.84 HISTORY OF GASTRIC BYPASS: Primary | ICD-10-CM

## 2025-08-22 LAB — VIT B12 SERPL-MCNC: 1825 PG/ML (ref 232–1245)

## 2025-08-23 LAB
25(OH)D3+25(OH)D2 SERPL-MCNC: 35.2 NG/ML (ref 30–100)
BASOPHILS # BLD AUTO: 0.1 X10E3/UL (ref 0–0.2)
BASOPHILS NFR BLD AUTO: 2 %
EOSINOPHIL # BLD AUTO: 0.5 X10E3/UL (ref 0–0.4)
EOSINOPHIL NFR BLD AUTO: 10 %
ERYTHROCYTE [DISTWIDTH] IN BLOOD BY AUTOMATED COUNT: 17.8 % (ref 11.7–15.4)
FERRITIN SERPL-MCNC: 9 NG/ML (ref 15–150)
HCT VFR BLD AUTO: 38.2 % (ref 34–46.6)
HGB BLD-MCNC: 10.7 G/DL (ref 11.1–15.9)
IMM GRANULOCYTES # BLD: 0 X10E3/UL (ref 0–0.1)
IMM GRANULOCYTES NFR BLD: 0 %
IRON SERPL-MCNC: 55 UG/DL (ref 27–159)
LYMPHOCYTES # BLD AUTO: 2.5 X10E3/UL (ref 0.7–3.1)
LYMPHOCYTES NFR BLD AUTO: 48 %
MCH RBC QN AUTO: 19.1 PG (ref 26.6–33)
MCHC RBC AUTO-ENTMCNC: 28 G/DL (ref 31.5–35.7)
MCV RBC AUTO: 68 FL (ref 79–97)
MONOCYTES # BLD AUTO: 0.3 X10E3/UL (ref 0.1–0.9)
MONOCYTES NFR BLD AUTO: 6 %
NEUTROPHILS # BLD AUTO: 1.8 X10E3/UL (ref 1.4–7)
NEUTROPHILS NFR BLD AUTO: 34 %
PLATELET # BLD AUTO: 456 X10E3/UL (ref 150–450)
RBC # BLD AUTO: 5.61 X10E6/UL (ref 3.77–5.28)
WBC # BLD AUTO: 5.3 X10E3/UL (ref 3.4–10.8)